# Patient Record
Sex: MALE | Race: WHITE | NOT HISPANIC OR LATINO | Employment: UNEMPLOYED | URBAN - METROPOLITAN AREA
[De-identification: names, ages, dates, MRNs, and addresses within clinical notes are randomized per-mention and may not be internally consistent; named-entity substitution may affect disease eponyms.]

---

## 2020-12-06 ENCOUNTER — HOSPITAL ENCOUNTER (EMERGENCY)
Facility: HOSPITAL | Age: 45
Discharge: HOME | End: 2020-12-06
Attending: EMERGENCY MEDICINE | Admitting: OTOLARYNGOLOGY
Payer: MEDICAID

## 2020-12-06 VITALS
TEMPERATURE: 98 F | SYSTOLIC BLOOD PRESSURE: 104 MMHG | BODY MASS INDEX: 23.9 KG/M2 | DIASTOLIC BLOOD PRESSURE: 54 MMHG | RESPIRATION RATE: 18 BRPM | OXYGEN SATURATION: 95 % | HEART RATE: 77 BPM | HEIGHT: 64 IN | WEIGHT: 140 LBS

## 2020-12-06 DIAGNOSIS — S02.40FA CLOSED FRACTURE OF LEFT ZYGOMATIC ARCH, INITIAL ENCOUNTER: Primary | ICD-10-CM
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
## 2020-12-06 DIAGNOSIS — S02.842A FRACTURE OF LATERAL ORBITAL WALL, LEFT SIDE, INITIAL ENCOUNTER FOR CLOSED FRACTURE: ICD-10-CM

## 2020-12-06 DIAGNOSIS — S02.401A CLOSED FRACTURE OF MAXILLARY SINUS, INITIAL ENCOUNTER: ICD-10-CM

## 2020-12-06 LAB
AMPHET+METHAMPHET UR QL: NORMAL
BARBITURATES UR QL SCN>200 NG/ML: NEGATIVE
BENZODIAZ UR QL SCN>200 NG/ML: NEGATIVE
BZE UR QL SCN: NEGATIVE
CANNABINOIDS UR QL SCN: NORMAL
CREAT UR-MCNC: 156 MG/DL (ref 23–375)
CTP QC/QA: YES
METHADONE UR QL SCN>300 NG/ML: NEGATIVE
OPIATES UR QL SCN: NORMAL
PCP UR QL SCN>25 NG/ML: NEGATIVE
SARS-COV-2 RDRP RESP QL NAA+PROBE: NEGATIVE
TOXICOLOGY INFORMATION: NORMAL

## 2020-12-06 PROCEDURE — 25000003 PHARM REV CODE 250: Performed by: EMERGENCY MEDICINE

## 2020-12-06 PROCEDURE — 96365 THER/PROPH/DIAG IV INF INIT: CPT

## 2020-12-06 PROCEDURE — 99284 EMERGENCY DEPT VISIT MOD MDM: CPT | Mod: 25

## 2020-12-06 PROCEDURE — 80307 DRUG TEST PRSMV CHEM ANLYZR: CPT

## 2020-12-06 PROCEDURE — 25000003 PHARM REV CODE 250: Performed by: PHYSICIAN ASSISTANT

## 2020-12-06 PROCEDURE — U0002 COVID-19 LAB TEST NON-CDC: HCPCS | Performed by: PHYSICIAN ASSISTANT

## 2020-12-06 RX ORDER — CLINDAMYCIN PHOSPHATE 600 MG/50ML
600 INJECTION, SOLUTION INTRAVENOUS
Status: COMPLETED | OUTPATIENT
Start: 2020-12-06 | End: 2020-12-06

## 2020-12-06 RX ORDER — CLINDAMYCIN HYDROCHLORIDE 150 MG/1
300 CAPSULE ORAL 4 TIMES DAILY
Qty: 56 CAPSULE | Refills: 0 | Status: SHIPPED | OUTPATIENT
Start: 2020-12-06 | End: 2020-12-13

## 2020-12-06 RX ORDER — ACETAMINOPHEN 500 MG
1000 TABLET ORAL
Status: COMPLETED | OUTPATIENT
Start: 2020-12-06 | End: 2020-12-06

## 2020-12-06 RX ADMIN — CLINDAMYCIN IN 5 PERCENT DEXTROSE 600 MG: 12 INJECTION, SOLUTION INTRAVENOUS at 07:12

## 2020-12-06 RX ADMIN — ACETAMINOPHEN 1000 MG: 500 TABLET ORAL at 09:12

## 2020-12-06 RX ADMIN — BACITRACIN, NEOMYCIN, POLYMYXIN B 1 EACH: 400; 3.5; 5 OINTMENT TOPICAL at 06:12
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
## 2020-12-07 NOTE — SUBJECTIVE & OBJECTIVE
Medications:  Continuous Infusions:  Scheduled Meds:  PRN Meds:     No current facility-administered medications on file prior to encounter.      Current Outpatient Medications on File Prior to Encounter   Medication Sig    ibuprofen (ADVIL,MOTRIN) 800 MG tablet Take 1 tablet (800 mg total) by mouth 3 (three) times daily.       Review of patient's allergies indicates:   Allergen Reactions    Penicillins Anaphylaxis       History reviewed. No pertinent past medical history.  History reviewed. No pertinent surgical history.  Family History     None        Tobacco Use    Smoking status: Current Every Day Smoker     Packs/day: 1.00    Smokeless tobacco: Never Used   Substance and Sexual Activity    Alcohol use: No    Drug use: Not Currently    Sexual activity: Not Currently     Review of Systems   Constitutional: Negative for chills and fever.   HENT: Positive for dental problem, facial swelling and nosebleeds. Negative for congestion, ear pain, hearing loss, sore throat, tinnitus, trouble swallowing and voice change.    Eyes: Negative for pain and visual disturbance.   Respiratory: Negative for shortness of breath and stridor.    Cardiovascular: Negative for chest pain.   Gastrointestinal: Positive for nausea and vomiting. Negative for abdominal pain.   Musculoskeletal: Negative for neck pain.   Skin: Positive for wound.   Allergic/Immunologic: Negative for immunocompromised state.   Neurological: Positive for dizziness. Negative for numbness and headaches.   Hematological: Does not bruise/bleed easily.   Psychiatric/Behavioral: Negative for decreased concentration and dysphoric mood. The patient is not nervous/anxious.      Objective:     Vital Signs (Most Recent):  Temp: 97.6 °F (36.4 °C) (12/06/20 1649)  Pulse: 81 (12/06/20 2015)  Resp: 20 (12/06/20 2015)  BP: 106/66 (12/06/20 2015)  SpO2: 98 % (12/06/20 2015) Vital Signs (24h Range):  Temp:  [97.6 °F (36.4 °C)] 97.6 °F (36.4 °C)  Pulse:  [81-92] 81  Resp:   [16-20] 20  SpO2:  [96 %-98 %] 98 %  BP: (106-109)/(56-66) 106/66     Weight: 63.5 kg (140 lb)  Body mass index is 24.03 kg/m².        Physical Exam   Constitutional: Well appearing/communicating. Voice clear. NAD.  Eyes: EOM I Bilaterally, denies double vision, periorbital ecchymosis L>R  Head/Face: Left facial swelling over midface, Mild House Brackmann I Bilaterally.  Right Ear: External Auditory Canal WNL,TM w/o masses/lesions/perforations. No hemotympanum. Auricle WNL.  Left Ear: External Auditory Canal WNL,TM w/o masses/lesions/perforations. No hemotympanum. Auricle WNL.  Nose: No gross nasal septal deviation. No septal hematoma. Inferior Turbinates 2+ bilaterally. No septal perforation. No masses/lesions. External nasal skin without masses/lesions.  Oral Cavity: Gingiva/lips WNL. Extremely poor dentition with many missing teeth. Oral Tongue mobile. Hard Palate WNL.   Oropharynx: Tonsillar fossa/pharyngeal wall without lesions. Posterior oropharynx WNL.  Soft palate without masses. Midline uvula.   Neck/Lymphatic: No LAD I-VI bilaterally.  No thyromegaly.  No masses noted on exam.  Neuro/Psychiatric: AOx3.  Normal mood and affect.   Respiratory: Normal respiratory effort, no stridor/stertor, no retractions noted.        Significant Labs:  None    Significant Diagnostics:  CT maxillofacial reviewed - displaced comminuted fractures of the left zygomatic arch, left lateral orbital wall, and left posterior maxillary sinus extending anteriorly to zygomatic arch, c/w ZMC fracture, fractures of the right anterior and posterior maxillary sinus walls (old per chart review)  CTH negative for intracranial complications.

## 2020-12-09 ENCOUNTER — OFFICE VISIT (OUTPATIENT)
Dept: OTOLARYNGOLOGY | Facility: CLINIC | Age: 45
End: 2020-12-09
Payer: MEDICAID

## 2020-12-09 ENCOUNTER — TELEPHONE (OUTPATIENT)
Dept: OTOLARYNGOLOGY | Facility: CLINIC | Age: 45
End: 2020-12-09

## 2020-12-09 VITALS
HEART RATE: 96 BPM | DIASTOLIC BLOOD PRESSURE: 79 MMHG | SYSTOLIC BLOOD PRESSURE: 119 MMHG | BODY MASS INDEX: 20.09 KG/M2 | WEIGHT: 117.06 LBS

## 2020-12-09 DIAGNOSIS — S02.40DA CLOSED FRACTURE OF LEFT ZYGOMATICOMAXILLARY COMPLEX, INITIAL ENCOUNTER: Primary | ICD-10-CM

## 2020-12-09 DIAGNOSIS — S02.82XA CLOSED FRACTURE OF LEFT ZYGOMATICOMAXILLARY COMPLEX, INITIAL ENCOUNTER: Primary | ICD-10-CM

## 2020-12-09 DIAGNOSIS — S02.40FA CLOSED FRACTURE OF LEFT ZYGOMATIC ARCH, INITIAL ENCOUNTER: ICD-10-CM

## 2020-12-09 DIAGNOSIS — S02.32XA CLOSED FRACTURE OF LEFT ZYGOMATICOMAXILLARY COMPLEX, INITIAL ENCOUNTER: Primary | ICD-10-CM

## 2020-12-09 DIAGNOSIS — S02.40FA CLOSED FRACTURE OF LEFT ZYGOMATICOMAXILLARY COMPLEX, INITIAL ENCOUNTER: Primary | ICD-10-CM

## 2020-12-09 PROCEDURE — 99999 PR PBB SHADOW E&M-EST. PATIENT-LVL III: ICD-10-PCS | Mod: PBBFAC,,, | Performed by: OTOLARYNGOLOGY

## 2020-12-09 PROCEDURE — 99203 PR OFFICE/OUTPT VISIT, NEW, LEVL III, 30-44 MIN: ICD-10-PCS | Mod: S$PBB,,, | Performed by: OTOLARYNGOLOGY

## 2020-12-09 PROCEDURE — 99203 OFFICE O/P NEW LOW 30 MIN: CPT | Mod: S$PBB,,, | Performed by: OTOLARYNGOLOGY

## 2020-12-09 PROCEDURE — 99213 OFFICE O/P EST LOW 20 MIN: CPT | Mod: PBBFAC | Performed by: OTOLARYNGOLOGY

## 2020-12-09 PROCEDURE — 99999 PR PBB SHADOW E&M-EST. PATIENT-LVL III: CPT | Mod: PBBFAC,,, | Performed by: OTOLARYNGOLOGY
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
## 2021-12-03 ENCOUNTER — HOSPITAL ENCOUNTER (EMERGENCY)
Facility: OTHER | Age: 46
Discharge: HOME | End: 2021-12-03
Attending: EMERGENCY MEDICINE
Payer: MEDICAID

## 2021-12-03 VITALS
WEIGHT: 120 LBS | RESPIRATION RATE: 18 BRPM | HEART RATE: 89 BPM | DIASTOLIC BLOOD PRESSURE: 82 MMHG | TEMPERATURE: 98 F | BODY MASS INDEX: 19.99 KG/M2 | HEIGHT: 65 IN | SYSTOLIC BLOOD PRESSURE: 131 MMHG | OXYGEN SATURATION: 97 %

## 2021-12-03 DIAGNOSIS — S09.93XA FACIAL TRAUMA: Primary | ICD-10-CM

## 2021-12-03 DIAGNOSIS — S00.33XA CONTUSION OF NOSE, INITIAL ENCOUNTER: ICD-10-CM

## 2021-12-03 DIAGNOSIS — R04.0 EPISTAXIS: ICD-10-CM

## 2021-12-03 DIAGNOSIS — T07.XXXA CONTUSION, MULTIPLE SITES: ICD-10-CM

## 2021-12-03 DIAGNOSIS — R07.89 CHEST WALL PAIN: ICD-10-CM

## 2021-12-03 DIAGNOSIS — R51.9 FACIAL PAIN: ICD-10-CM
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
## 2021-12-03 DIAGNOSIS — T07.XXXA ABRASION, MULTIPLE SITES: ICD-10-CM

## 2021-12-03 LAB
CTP QC/QA: YES
SARS-COV-2 RDRP RESP QL NAA+PROBE: NEGATIVE

## 2021-12-03 PROCEDURE — 99285 EMERGENCY DEPT VISIT HI MDM: CPT | Mod: 25

## 2021-12-03 PROCEDURE — U0002 COVID-19 LAB TEST NON-CDC: HCPCS | Performed by: EMERGENCY MEDICINE

## 2021-12-03 PROCEDURE — 25000003 PHARM REV CODE 250: Performed by: EMERGENCY MEDICINE

## 2021-12-03 RX ORDER — CLINDAMYCIN HYDROCHLORIDE 150 MG/1
450 CAPSULE ORAL 3 TIMES DAILY
Qty: 63 CAPSULE | Refills: 0 | Status: SHIPPED | OUTPATIENT
Start: 2021-12-03 | End: 2021-12-10

## 2021-12-03 RX ORDER — NAPROXEN 500 MG/1
500 TABLET ORAL 2 TIMES DAILY PRN
Qty: 60 TABLET | Refills: 0 | Status: ON HOLD | OUTPATIENT
Start: 2021-12-03 | End: 2023-01-13

## 2021-12-03 RX ORDER — DOXYCYCLINE 100 MG/1
100 CAPSULE ORAL 2 TIMES DAILY
Qty: 20 CAPSULE | Refills: 0 | Status: SHIPPED | OUTPATIENT
Start: 2021-12-03 | End: 2021-12-10

## 2021-12-03 RX ORDER — HYDROCODONE BITARTRATE AND ACETAMINOPHEN 5; 325 MG/1; MG/1
1 TABLET ORAL
Status: COMPLETED | OUTPATIENT
Start: 2021-12-03 | End: 2021-12-03

## 2021-12-03 RX ORDER — CLINDAMYCIN HYDROCHLORIDE 150 MG/1
450 CAPSULE ORAL
Status: COMPLETED | OUTPATIENT
Start: 2021-12-03 | End: 2021-12-03

## 2021-12-03 RX ORDER — MUPIROCIN 20 MG/G
1 OINTMENT TOPICAL
Status: COMPLETED | OUTPATIENT
Start: 2021-12-03 | End: 2021-12-03

## 2021-12-03 RX ORDER — PROPARACAINE HYDROCHLORIDE 5 MG/ML
2 SOLUTION/ DROPS OPHTHALMIC
Status: COMPLETED | OUTPATIENT
Start: 2021-12-03 | End: 2021-12-03

## 2021-12-03 RX ORDER — OXYMETAZOLINE HCL 0.05 %
2 SPRAY, NON-AEROSOL (ML) NASAL
Status: COMPLETED | OUTPATIENT
Start: 2021-12-03 | End: 2021-12-03

## 2021-12-03 RX ORDER — DOXYCYCLINE HYCLATE 100 MG
100 TABLET ORAL
Status: COMPLETED | OUTPATIENT
Start: 2021-12-03 | End: 2021-12-03

## 2021-12-03 RX ORDER — OXYMETAZOLINE HCL 0.05 %
1 SPRAY, NON-AEROSOL (ML) NASAL 2 TIMES DAILY
Qty: 15 ML | Refills: 0 | Status: SHIPPED | OUTPATIENT
Start: 2021-12-03 | End: 2021-12-06

## 2021-12-03 RX ORDER — NAPROXEN 500 MG/1
500 TABLET ORAL
Status: COMPLETED | OUTPATIENT
Start: 2021-12-03 | End: 2021-12-03

## 2021-12-03 RX ADMIN — Medication 2 SPRAY: at 01:12

## 2021-12-03 RX ADMIN — PROPARACAINE HYDROCHLORIDE 2 DROP: 5 SOLUTION/ DROPS OPHTHALMIC at 11:12

## 2021-12-03 RX ADMIN — DOXYCYCLINE HYCLATE 100 MG: 100 TABLET, COATED ORAL at 11:12

## 2021-12-03 RX ADMIN — FLUORESCEIN SODIUM 1 EACH: 1 STRIP OPHTHALMIC at 11:12

## 2021-12-03 RX ADMIN — MUPIROCIN 22 G: 20 OINTMENT TOPICAL at 11:12

## 2021-12-03 RX ADMIN — CLINDAMYCIN HYDROCHLORIDE 450 MG: 150 CAPSULE ORAL at 11:12

## 2021-12-03 RX ADMIN — NAPROXEN 500 MG: 500 TABLET ORAL at 11:12

## 2021-12-03 RX ADMIN — HYDROCODONE BITARTRATE AND ACETAMINOPHEN 1 TABLET: 5; 325 TABLET ORAL at 11:12

## 2022-01-09 ENCOUNTER — HOSPITAL ENCOUNTER (EMERGENCY)
Facility: HOSPITAL | Age: 47
Discharge: HOME | End: 2022-01-09
Attending: EMERGENCY MEDICINE
Payer: MEDICAID

## 2022-01-09 VITALS
RESPIRATION RATE: 18 BRPM | BODY MASS INDEX: 19.97 KG/M2 | SYSTOLIC BLOOD PRESSURE: 108 MMHG | TEMPERATURE: 98 F | OXYGEN SATURATION: 96 % | HEART RATE: 104 BPM | HEIGHT: 65 IN | DIASTOLIC BLOOD PRESSURE: 62 MMHG

## 2022-01-09 DIAGNOSIS — T50.901A ACCIDENTAL OVERDOSE, INITIAL ENCOUNTER: Primary | ICD-10-CM

## 2022-01-09 PROCEDURE — 99283 EMERGENCY DEPT VISIT LOW MDM: CPT

## 2022-01-09 PROCEDURE — 99284 EMERGENCY DEPT VISIT MOD MDM: CPT | Mod: ,,, | Performed by: EMERGENCY MEDICINE

## 2022-01-09 PROCEDURE — 99284 PR EMERGENCY DEPT VISIT,LEVEL IV: ICD-10-PCS | Mod: ,,, | Performed by: EMERGENCY MEDICINE

## 2022-01-09 RX ORDER — NALOXONE HYDROCHLORIDE 4 MG/.1ML
SPRAY NASAL
Qty: 1 EACH | Refills: 11 | Status: ON HOLD | OUTPATIENT
Start: 2022-01-09 | End: 2023-01-13

## 2022-01-09 NOTE — ED NOTES
Bed: [facility]  Expected date:   Expected time:   Means of arrival:   Comments:

## 2022-01-09 NOTE — ED PROVIDER NOTES
Encounter Date: 1/9/2022       History     Chief Complaint   Patient presents with    Drug Overdose     Mr. Bruno is a 46-year-old who presents by ambulance. He is unsure of what happened. He states that he completed a 28 day inpatient treatment program for heroin addiction last week and was prescribed Seroquel and Suboxone. He took Seroquel at around 03:00 and Suboxone at around 09:30.  The next thing he remembers is waking up in the ambulance.  He states that this was his first time taking Suboxone.  He feels well now and has no complaints.    He has no past medical history on file.    The history is provided by the patient. No  was used.     Review of patient's allergies indicates:   Allergen Reactions    Penicillins Anaphylaxis    Aspirin Rash     In childhood. Rash and bruising. Tolerates other NSAIDs including ibuprofen     No past medical history on file.  No past surgical history on file.  No family history on file.  Social History     Tobacco Use    Smoking status: Current Every Day Smoker     Packs/day: 1.00    Smokeless tobacco: Never Used   Substance Use Topics    Alcohol use: No    Drug use: Not Currently     Review of Systems   Constitutional: Negative for chills and fever.   Respiratory: Negative for cough and shortness of breath.    Cardiovascular: Negative for chest pain.   Gastrointestinal: Negative for abdominal pain, nausea and vomiting.   Musculoskeletal: Negative for arthralgias and myalgias.   Neurological: Negative for dizziness, light-headedness and headaches.       Physical Exam     Initial Vitals [01/09/22 1108]   BP Pulse Resp Temp SpO2   122/74 65 15 97.9 °F (36.6 °C) 95 %      MAP       --         Physical Exam    Nursing note and vitals reviewed.  Constitutional: He is not diaphoretic. No distress.   HENT:   Head: Normocephalic and atraumatic.   Eyes: Conjunctivae and EOM are normal. Pupils are equal, round, and reactive to light. No scleral icterus. Right  eye exhibits no nystagmus. Left eye exhibits no nystagmus.   Cardiovascular: Normal rate, regular rhythm and normal heart sounds. Exam reveals no gallop and no friction rub.    No murmur heard.  Pulmonary/Chest: No stridor. No respiratory distress. He has no decreased breath sounds. He has no wheezes. He has no rhonchi. He has no rales.   Abdominal: Abdomen is soft. He exhibits no distension. There is no abdominal tenderness.     Neurological: He is alert and oriented to person, place, and time. He has normal strength. No cranial nerve deficit. Coordination normal. GCS score is 15. GCS eye subscore is 4. GCS verbal subscore is 5. GCS motor subscore is 6.   Skin: Skin is warm and dry. No pallor.   Psychiatric: He has a normal mood and affect. His speech is normal and behavior is normal. Thought content normal. He is not actively hallucinating. He is attentive.         ED Course   Procedures  Labs Reviewed - No data to display       Imaging Results    None          Medications - No data to display              ED Course as of 01/09/22 1248   Sun Jan 09, 2022   1240 He still feels well.  He wishes to be discharged. He called his girlfriend and she is on her way to pick him up. [LP]      ED Course User Index  [LP] Blade Cee III, MD             Clinical Impression:   Final diagnoses:  [T50.901A] Accidental overdose, initial encounter (Primary)          ED Disposition Condition    Discharge Stable        ED Prescriptions     Medication Sig Dispense Start Date End Date Auth. Provider    naloxone (NARCAN) 4 mg/actuation Spry 4mg by nasal route as needed for opioid overdose; may repeat every 2-3 minutes in alternating nostrils until medical help arrives. Call 911 1 each 1/9/2022  Blade Cee III, MD        Follow-up Information     Follow up With Specialties Details Why Contact Info    Your primary care provider   We recommend that you arrange follow up with your primary care provider within the next few days.      [facility] Emergency Medicine  Return to the ER as needed for any concerns. [address]  [facility] [id number] [phone]           Blade Cee III, MD  01/09/22 4249

## 2022-01-09 NOTE — ED NOTES
Patient identifiers verified and correct for Endy Bruno   C/C: Overdose    APPEARANCE: Awake and alert x 4, ambulatory, moving all extremities, respiration unlabored   SKIN: warm, dry and intact. No breakdown or bruising.  MUSCULOSKELETAL: Patient moving all extremities spontaneously, no obvious swelling or deformities noted. Ambulates independently.  RESPIRATORY: Denies shortness of breath.Respirations unlabored.   CARDIAC: Denies CP, 2+ distal pulses; no peripheral edema  ABDOMEN: S/ND/NT, Denies nausea  : voids spontaneously, denies difficulty  Neurologic: AAO x 4; follows commands equal strength in all extremities; denies numbness/tingling. Denies dizziness

## 2023-01-07 ENCOUNTER — HOSPITAL ENCOUNTER (INPATIENT)
Facility: HOSPITAL | Age: 48
LOS: 6 days | Discharge: NURSING, REHAB OR LONG-TERM CARE | DRG: 711 | End: 2023-01-13
Attending: EMERGENCY MEDICINE | Admitting: UROLOGY
Payer: MEDICAID

## 2023-01-07 ENCOUNTER — ANESTHESIA (OUTPATIENT)
Dept: SURGERY | Facility: HOSPITAL | Age: 48
DRG: 711 | End: 2023-01-07
Payer: MEDICAID

## 2023-01-07 ENCOUNTER — ANESTHESIA EVENT (OUTPATIENT)
Dept: SURGERY | Facility: HOSPITAL | Age: 48
DRG: 711 | End: 2023-01-07
Payer: MEDICAID

## 2023-01-07 DIAGNOSIS — N44.00 RIGHT TESTICULAR TORSION: Primary | ICD-10-CM

## 2023-01-07 DIAGNOSIS — S39.94XD TESTICULAR INJURY, SUBSEQUENT ENCOUNTER: ICD-10-CM

## 2023-01-07 DIAGNOSIS — N45.4 TESTICULAR ABSCESS: ICD-10-CM

## 2023-01-07 DIAGNOSIS — S39.94XA: ICD-10-CM

## 2023-01-07 DIAGNOSIS — S39.94XA TESTICULAR INJURY, INITIAL ENCOUNTER: ICD-10-CM

## 2023-01-07 LAB
ABO + RH BLD: NORMAL
ALBUMIN SERPL BCP-MCNC: 3 G/DL (ref 3.5–5.2)
ALP SERPL-CCNC: 104 U/L (ref 55–135)
ALT SERPL W/O P-5'-P-CCNC: 6 U/L (ref 10–44)
ANION GAP SERPL CALC-SCNC: 10 MMOL/L (ref 8–16)
AST SERPL-CCNC: 12 U/L (ref 10–40)
BASOPHILS # BLD AUTO: 0.05 K/UL (ref 0–0.2)
BASOPHILS NFR BLD: 0.3 % (ref 0–1.9)
BILIRUB SERPL-MCNC: 0.8 MG/DL (ref 0.1–1)
BLD GP AB SCN CELLS X3 SERPL QL: NORMAL
BUN SERPL-MCNC: 9 MG/DL (ref 6–20)
CALCIUM SERPL-MCNC: 9.8 MG/DL (ref 8.7–10.5)
CHLORIDE SERPL-SCNC: 96 MMOL/L (ref 95–110)
CO2 SERPL-SCNC: 23 MMOL/L (ref 23–29)
CREAT SERPL-MCNC: 0.7 MG/DL (ref 0.5–1.4)
DIFFERENTIAL METHOD: ABNORMAL
EOSINOPHIL # BLD AUTO: 0.1 K/UL (ref 0–0.5)
EOSINOPHIL NFR BLD: 0.8 % (ref 0–8)
ERYTHROCYTE [DISTWIDTH] IN BLOOD BY AUTOMATED COUNT: 13.6 % (ref 11.5–14.5)
EST. GFR  (NO RACE VARIABLE): >60 ML/MIN/1.73 M^2
GLUCOSE SERPL-MCNC: 99 MG/DL (ref 70–110)
GRAM STN SPEC: NORMAL
GRAM STN SPEC: NORMAL
HCT VFR BLD AUTO: 36.3 % (ref 40–54)
HCV AB SERPL QL IA: REACTIVE
HGB BLD-MCNC: 11.7 G/DL (ref 14–18)
HIV 1+2 AB+HIV1 P24 AG SERPL QL IA: NORMAL
IMM GRANULOCYTES # BLD AUTO: 0.08 K/UL (ref 0–0.04)
IMM GRANULOCYTES NFR BLD AUTO: 0.5 % (ref 0–0.5)
LYMPHOCYTES # BLD AUTO: 1.3 K/UL (ref 1–4.8)
LYMPHOCYTES NFR BLD: 8.7 % (ref 18–48)
MCH RBC QN AUTO: 25.7 PG (ref 27–31)
MCHC RBC AUTO-ENTMCNC: 32.2 G/DL (ref 32–36)
MCV RBC AUTO: 80 FL (ref 82–98)
MONOCYTES # BLD AUTO: 0.7 K/UL (ref 0.3–1)
MONOCYTES NFR BLD: 4.5 % (ref 4–15)
NEUTROPHILS # BLD AUTO: 12.5 K/UL (ref 1.8–7.7)
NEUTROPHILS NFR BLD: 85.2 % (ref 38–73)
NRBC BLD-RTO: 0 /100 WBC
PLATELET # BLD AUTO: 376 K/UL (ref 150–450)
PMV BLD AUTO: 10.1 FL (ref 9.2–12.9)
POTASSIUM SERPL-SCNC: 3.8 MMOL/L (ref 3.5–5.1)
PROT SERPL-MCNC: 8 G/DL (ref 6–8.4)
RBC # BLD AUTO: 4.55 M/UL (ref 4.6–6.2)
SODIUM SERPL-SCNC: 129 MMOL/L (ref 136–145)
WBC # BLD AUTO: 14.63 K/UL (ref 3.9–12.7)

## 2023-01-07 PROCEDURE — 96372 THER/PROPH/DIAG INJ SC/IM: CPT | Performed by: EMERGENCY MEDICINE

## 2023-01-07 PROCEDURE — 25000003 PHARM REV CODE 250: Performed by: STUDENT IN AN ORGANIZED HEALTH CARE EDUCATION/TRAINING PROGRAM

## 2023-01-07 PROCEDURE — 37000009 HC ANESTHESIA EA ADD 15 MINS: Performed by: UROLOGY

## 2023-01-07 PROCEDURE — 88312 SPECIAL STAINS GROUP 1: CPT | Mod: 26,,, | Performed by: PATHOLOGY

## 2023-01-07 PROCEDURE — 99285 EMERGENCY DEPT VISIT HI MDM: CPT | Mod: ,,, | Performed by: EMERGENCY MEDICINE

## 2023-01-07 PROCEDURE — 54520 PR REMOVAL TESTIS,SIMPLE: ICD-10-PCS | Mod: RT,,, | Performed by: UROLOGY

## 2023-01-07 PROCEDURE — 71000016 HC POSTOP RECOV ADDL HR: Performed by: UROLOGY

## 2023-01-07 PROCEDURE — 87102 FUNGUS ISOLATION CULTURE: CPT | Performed by: UROLOGY

## 2023-01-07 PROCEDURE — 87522 HEPATITIS C REVRS TRNSCRPJ: CPT | Performed by: PHYSICIAN ASSISTANT

## 2023-01-07 PROCEDURE — 88305 TISSUE EXAM BY PATHOLOGIST: CPT | Performed by: PATHOLOGY

## 2023-01-07 PROCEDURE — 88312 SPECIAL STAINS GROUP 1: CPT | Performed by: PATHOLOGY

## 2023-01-07 PROCEDURE — 63600175 PHARM REV CODE 636 W HCPCS: Performed by: ANESTHESIOLOGY

## 2023-01-07 PROCEDURE — 25000003 PHARM REV CODE 250: Performed by: NURSE ANESTHETIST, CERTIFIED REGISTERED

## 2023-01-07 PROCEDURE — 88305 TISSUE EXAM BY PATHOLOGIST: ICD-10-PCS | Mod: 26,,, | Performed by: PATHOLOGY

## 2023-01-07 PROCEDURE — 63600175 PHARM REV CODE 636 W HCPCS: Performed by: UROLOGY

## 2023-01-07 PROCEDURE — 80053 COMPREHEN METABOLIC PANEL: CPT | Performed by: EMERGENCY MEDICINE

## 2023-01-07 PROCEDURE — 36000707: Performed by: UROLOGY

## 2023-01-07 PROCEDURE — 87015 SPECIMEN INFECT AGNT CONCNTJ: CPT | Performed by: UROLOGY

## 2023-01-07 PROCEDURE — 25000003 PHARM REV CODE 250: Performed by: UROLOGY

## 2023-01-07 PROCEDURE — 63600175 PHARM REV CODE 636 W HCPCS: Performed by: NURSE ANESTHETIST, CERTIFIED REGISTERED

## 2023-01-07 PROCEDURE — 87205 SMEAR GRAM STAIN: CPT | Performed by: UROLOGY

## 2023-01-07 PROCEDURE — 54700 I&D EPDIDYMS TSTIS&/SCROT SP: CPT | Mod: 51,RT,, | Performed by: UROLOGY

## 2023-01-07 PROCEDURE — 87389 HIV-1 AG W/HIV-1&-2 AB AG IA: CPT | Performed by: PHYSICIAN ASSISTANT

## 2023-01-07 PROCEDURE — D9220A PRA ANESTHESIA: Mod: CRNA,,, | Performed by: NURSE ANESTHETIST, CERTIFIED REGISTERED

## 2023-01-07 PROCEDURE — 25000003 PHARM REV CODE 250: Performed by: EMERGENCY MEDICINE

## 2023-01-07 PROCEDURE — 87075 CULTR BACTERIA EXCEPT BLOOD: CPT | Performed by: UROLOGY

## 2023-01-07 PROCEDURE — 99285 EMERGENCY DEPT VISIT HI MDM: CPT | Mod: 25

## 2023-01-07 PROCEDURE — 87070 CULTURE OTHR SPECIMN AEROBIC: CPT | Performed by: UROLOGY

## 2023-01-07 PROCEDURE — 54700 PR INCIS/DRAIN SCROTUM/TESTIS,EPIDIDYM: ICD-10-PCS | Mod: 51,RT,, | Performed by: UROLOGY

## 2023-01-07 PROCEDURE — 63600175 PHARM REV CODE 636 W HCPCS: Performed by: STUDENT IN AN ORGANIZED HEALTH CARE EDUCATION/TRAINING PROGRAM

## 2023-01-07 PROCEDURE — 37000008 HC ANESTHESIA 1ST 15 MINUTES: Performed by: UROLOGY

## 2023-01-07 PROCEDURE — 86803 HEPATITIS C AB TEST: CPT | Performed by: PHYSICIAN ASSISTANT

## 2023-01-07 PROCEDURE — 71000033 HC RECOVERY, INTIAL HOUR: Performed by: UROLOGY

## 2023-01-07 PROCEDURE — D9220A PRA ANESTHESIA: ICD-10-PCS | Mod: CRNA,,, | Performed by: NURSE ANESTHETIST, CERTIFIED REGISTERED

## 2023-01-07 PROCEDURE — 86900 BLOOD TYPING SEROLOGIC ABO: CPT | Performed by: EMERGENCY MEDICINE

## 2023-01-07 PROCEDURE — 88312 PR  SPECIAL STAINS,GROUP I: ICD-10-PCS | Mod: 26,,, | Performed by: PATHOLOGY

## 2023-01-07 PROCEDURE — 71000015 HC POSTOP RECOV 1ST HR: Performed by: UROLOGY

## 2023-01-07 PROCEDURE — D9220A PRA ANESTHESIA: ICD-10-PCS | Mod: ANES,,, | Performed by: ANESTHESIOLOGY

## 2023-01-07 PROCEDURE — 36000706: Performed by: UROLOGY

## 2023-01-07 PROCEDURE — 99285 PR EMERGENCY DEPT VISIT,LEVEL V: ICD-10-PCS | Mod: ,,, | Performed by: EMERGENCY MEDICINE

## 2023-01-07 PROCEDURE — 87116 MYCOBACTERIA CULTURE: CPT | Performed by: UROLOGY

## 2023-01-07 PROCEDURE — 88305 TISSUE EXAM BY PATHOLOGIST: CPT | Mod: 26,,, | Performed by: PATHOLOGY

## 2023-01-07 PROCEDURE — 87077 CULTURE AEROBIC IDENTIFY: CPT | Performed by: UROLOGY

## 2023-01-07 PROCEDURE — 87186 SC STD MICRODIL/AGAR DIL: CPT | Performed by: UROLOGY

## 2023-01-07 PROCEDURE — 87206 SMEAR FLUORESCENT/ACID STAI: CPT | Performed by: UROLOGY

## 2023-01-07 PROCEDURE — 54520 REMOVAL OF TESTIS: CPT | Mod: RT,,, | Performed by: UROLOGY

## 2023-01-07 PROCEDURE — 20600001 HC STEP DOWN PRIVATE ROOM

## 2023-01-07 PROCEDURE — 85025 COMPLETE CBC W/AUTO DIFF WBC: CPT | Performed by: EMERGENCY MEDICINE

## 2023-01-07 PROCEDURE — D9220A PRA ANESTHESIA: Mod: ANES,,, | Performed by: ANESTHESIOLOGY

## 2023-01-07 RX ORDER — TALC
6 POWDER (GRAM) TOPICAL NIGHTLY PRN
Status: DISCONTINUED | OUTPATIENT
Start: 2023-01-07 | End: 2023-01-13 | Stop reason: HOSPADM

## 2023-01-07 RX ORDER — LIDOCAINE HYDROCHLORIDE 10 MG/ML
1 INJECTION, SOLUTION EPIDURAL; INFILTRATION; INTRACAUDAL; PERINEURAL ONCE AS NEEDED
Status: DISCONTINUED | OUTPATIENT
Start: 2023-01-07 | End: 2023-01-13 | Stop reason: HOSPADM

## 2023-01-07 RX ORDER — MAGNESIUM SULFATE HEPTAHYDRATE 40 MG/ML
INJECTION, SOLUTION INTRAVENOUS
Status: DISCONTINUED | OUTPATIENT
Start: 2023-01-07 | End: 2023-01-07

## 2023-01-07 RX ORDER — DEXMEDETOMIDINE HYDROCHLORIDE 100 UG/ML
INJECTION, SOLUTION INTRAVENOUS
Status: DISCONTINUED | OUTPATIENT
Start: 2023-01-07 | End: 2023-01-07

## 2023-01-07 RX ORDER — MIDAZOLAM HYDROCHLORIDE 1 MG/ML
INJECTION INTRAMUSCULAR; INTRAVENOUS
Status: COMPLETED
Start: 2023-01-07 | End: 2023-01-07

## 2023-01-07 RX ORDER — HALOPERIDOL 5 MG/ML
0.5 INJECTION INTRAMUSCULAR EVERY 10 MIN PRN
Status: DISCONTINUED | OUTPATIENT
Start: 2023-01-07 | End: 2023-01-07 | Stop reason: HOSPADM

## 2023-01-07 RX ORDER — HYDROMORPHONE HYDROCHLORIDE 1 MG/ML
0.2 INJECTION, SOLUTION INTRAMUSCULAR; INTRAVENOUS; SUBCUTANEOUS EVERY 5 MIN PRN
Status: DISCONTINUED | OUTPATIENT
Start: 2023-01-07 | End: 2023-01-07 | Stop reason: HOSPADM

## 2023-01-07 RX ORDER — KETAMINE HCL IN 0.9 % NACL 50 MG/5 ML
SYRINGE (ML) INTRAVENOUS
Status: DISCONTINUED | OUTPATIENT
Start: 2023-01-07 | End: 2023-01-07

## 2023-01-07 RX ORDER — ONDANSETRON 8 MG/1
8 TABLET, ORALLY DISINTEGRATING ORAL EVERY 8 HOURS PRN
Status: DISCONTINUED | OUTPATIENT
Start: 2023-01-07 | End: 2023-01-13 | Stop reason: HOSPADM

## 2023-01-07 RX ORDER — FENTANYL CITRATE 50 UG/ML
25 INJECTION, SOLUTION INTRAMUSCULAR; INTRAVENOUS EVERY 5 MIN PRN
Status: COMPLETED | OUTPATIENT
Start: 2023-01-07 | End: 2023-01-07

## 2023-01-07 RX ORDER — ACETAMINOPHEN 325 MG/1
650 TABLET ORAL EVERY 8 HOURS PRN
Status: DISCONTINUED | OUTPATIENT
Start: 2023-01-07 | End: 2023-01-08

## 2023-01-07 RX ORDER — ROCURONIUM BROMIDE 10 MG/ML
INJECTION, SOLUTION INTRAVENOUS
Status: DISCONTINUED | OUTPATIENT
Start: 2023-01-07 | End: 2023-01-07

## 2023-01-07 RX ORDER — SODIUM CHLORIDE 9 MG/ML
INJECTION, SOLUTION INTRAVENOUS CONTINUOUS
Status: DISCONTINUED | OUTPATIENT
Start: 2023-01-07 | End: 2023-01-09

## 2023-01-07 RX ORDER — KETAMINE HCL IN 0.9 % NACL 50 MG/5 ML
10 SYRINGE (ML) INTRAVENOUS ONCE
Status: COMPLETED | OUTPATIENT
Start: 2023-01-07 | End: 2023-01-07

## 2023-01-07 RX ORDER — PHENYLEPHRINE HYDROCHLORIDE 10 MG/ML
INJECTION INTRAVENOUS
Status: DISCONTINUED | OUTPATIENT
Start: 2023-01-07 | End: 2023-01-07

## 2023-01-07 RX ORDER — SODIUM CHLORIDE 0.9 % (FLUSH) 0.9 %
10 SYRINGE (ML) INJECTION
Status: DISCONTINUED | OUTPATIENT
Start: 2023-01-07 | End: 2023-01-13 | Stop reason: HOSPADM

## 2023-01-07 RX ORDER — KETOROLAC TROMETHAMINE 30 MG/ML
INJECTION, SOLUTION INTRAMUSCULAR; INTRAVENOUS
Status: DISCONTINUED | OUTPATIENT
Start: 2023-01-07 | End: 2023-01-07

## 2023-01-07 RX ORDER — OXYCODONE HYDROCHLORIDE 5 MG/1
5 TABLET ORAL EVERY 4 HOURS PRN
Status: DISCONTINUED | OUTPATIENT
Start: 2023-01-07 | End: 2023-01-08

## 2023-01-07 RX ORDER — FENTANYL CITRATE 50 UG/ML
INJECTION, SOLUTION INTRAMUSCULAR; INTRAVENOUS
Status: DISCONTINUED | OUTPATIENT
Start: 2023-01-07 | End: 2023-01-07

## 2023-01-07 RX ORDER — ACETAMINOPHEN 325 MG/1
650 TABLET ORAL EVERY 4 HOURS PRN
Status: DISCONTINUED | OUTPATIENT
Start: 2023-01-07 | End: 2023-01-08

## 2023-01-07 RX ORDER — OXYCODONE HYDROCHLORIDE 10 MG/1
10 TABLET ORAL EVERY 4 HOURS PRN
Status: DISCONTINUED | OUTPATIENT
Start: 2023-01-07 | End: 2023-01-08

## 2023-01-07 RX ORDER — LIDOCAINE HYDROCHLORIDE 20 MG/ML
INJECTION, SOLUTION EPIDURAL; INFILTRATION; INTRACAUDAL; PERINEURAL
Status: DISCONTINUED | OUTPATIENT
Start: 2023-01-07 | End: 2023-01-07

## 2023-01-07 RX ORDER — HYDROMORPHONE HYDROCHLORIDE 1 MG/ML
0.2 INJECTION, SOLUTION INTRAMUSCULAR; INTRAVENOUS; SUBCUTANEOUS EVERY 6 HOURS PRN
Status: DISCONTINUED | OUTPATIENT
Start: 2023-01-07 | End: 2023-01-08

## 2023-01-07 RX ORDER — SODIUM CHLORIDE 0.9 % (FLUSH) 0.9 %
10 SYRINGE (ML) INJECTION
Status: DISCONTINUED | OUTPATIENT
Start: 2023-01-07 | End: 2023-01-07 | Stop reason: HOSPADM

## 2023-01-07 RX ORDER — PROPOFOL 10 MG/ML
VIAL (ML) INTRAVENOUS
Status: DISCONTINUED | OUTPATIENT
Start: 2023-01-07 | End: 2023-01-07

## 2023-01-07 RX ORDER — KETAMINE HCL IN 0.9 % NACL 50 MG/5 ML
0.3 SYRINGE (ML) INTRAVENOUS ONCE
Status: COMPLETED | OUTPATIENT
Start: 2023-01-07 | End: 2023-01-07

## 2023-01-07 RX ORDER — SUCCINYLCHOLINE CHLORIDE 20 MG/ML
INJECTION INTRAMUSCULAR; INTRAVENOUS
Status: DISCONTINUED | OUTPATIENT
Start: 2023-01-07 | End: 2023-01-07

## 2023-01-07 RX ORDER — MIDAZOLAM HYDROCHLORIDE 1 MG/ML
INJECTION INTRAMUSCULAR; INTRAVENOUS
Status: DISCONTINUED | OUTPATIENT
Start: 2023-01-07 | End: 2023-01-07

## 2023-01-07 RX ADMIN — FENTANYL CITRATE 25 MCG: 50 INJECTION INTRAMUSCULAR; INTRAVENOUS at 12:01

## 2023-01-07 RX ADMIN — VANCOMYCIN HYDROCHLORIDE 1000 MG: 1 INJECTION, POWDER, LYOPHILIZED, FOR SOLUTION INTRAVENOUS at 05:01

## 2023-01-07 RX ADMIN — ROCURONIUM BROMIDE 30 MG: 10 INJECTION, SOLUTION INTRAVENOUS at 09:01

## 2023-01-07 RX ADMIN — Medication 30 MG: at 09:01

## 2023-01-07 RX ADMIN — Medication 10 MG: at 04:01

## 2023-01-07 RX ADMIN — MAGNESIUM SULFATE 1 G: 2 INJECTION INTRAVENOUS at 09:01

## 2023-01-07 RX ADMIN — PROPOFOL 150 MG: 10 INJECTION, EMULSION INTRAVENOUS at 09:01

## 2023-01-07 RX ADMIN — FENTANYL CITRATE 25 MCG: 50 INJECTION INTRAMUSCULAR; INTRAVENOUS at 11:01

## 2023-01-07 RX ADMIN — ROCURONIUM BROMIDE 20 MG: 10 INJECTION, SOLUTION INTRAVENOUS at 10:01

## 2023-01-07 RX ADMIN — SUCCINYLCHOLINE CHLORIDE 100 MG: 20 INJECTION, SOLUTION INTRAMUSCULAR; INTRAVENOUS at 09:01

## 2023-01-07 RX ADMIN — Medication 10 MG: at 05:01

## 2023-01-07 RX ADMIN — SODIUM CHLORIDE: 9 INJECTION, SOLUTION INTRAVENOUS at 11:01

## 2023-01-07 RX ADMIN — DEXMEDETOMIDINE HYDROCHLORIDE 25 MCG: 100 INJECTION, SOLUTION INTRAVENOUS at 09:01

## 2023-01-07 RX ADMIN — PHENYLEPHRINE HYDROCHLORIDE 200 MCG: 10 INJECTION INTRAVENOUS at 10:01

## 2023-01-07 RX ADMIN — VANCOMYCIN HYDROCHLORIDE 1000 MG: 1 INJECTION, POWDER, LYOPHILIZED, FOR SOLUTION INTRAVENOUS at 09:01

## 2023-01-07 RX ADMIN — MEROPENEM 1 G: 1 INJECTION, POWDER, FOR SOLUTION INTRAVENOUS at 08:01

## 2023-01-07 RX ADMIN — FENTANYL CITRATE 100 MCG: 0.05 INJECTION, SOLUTION INTRAMUSCULAR; INTRAVENOUS at 09:01

## 2023-01-07 RX ADMIN — SUGAMMADEX 200 MG: 100 INJECTION, SOLUTION INTRAVENOUS at 10:01

## 2023-01-07 RX ADMIN — DEXTROSE 2 G: 50 INJECTION, SOLUTION INTRAVENOUS at 09:01

## 2023-01-07 RX ADMIN — HALOPERIDOL LACTATE 0.5 MG: 5 INJECTION, SOLUTION INTRAMUSCULAR at 12:01

## 2023-01-07 RX ADMIN — MIDAZOLAM HYDROCHLORIDE 4 MG: 1 INJECTION, SOLUTION INTRAMUSCULAR; INTRAVENOUS at 08:01

## 2023-01-07 RX ADMIN — OXYCODONE HYDROCHLORIDE 10 MG: 10 TABLET ORAL at 12:01

## 2023-01-07 RX ADMIN — KETOROLAC TROMETHAMINE 30 MG: 30 INJECTION, SOLUTION INTRAMUSCULAR; INTRAVENOUS at 09:01

## 2023-01-07 RX ADMIN — LIDOCAINE HYDROCHLORIDE 100 MG: 20 INJECTION, SOLUTION EPIDURAL; INFILTRATION; INTRACAUDAL; PERINEURAL at 09:01
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
## 2023-01-07 NOTE — TRANSFER OF CARE
"Anesthesia Transfer of Care Note    Patient: Endy Bruno    Procedure(s) Performed: Procedure(s) (LRB):  EXPLORATION, SCROTUM (Right)  ORCHIECTOMY (Right)  INCISION AND DRAINAGE, ABSCESS (Right)    Patient location: PACU    Anesthesia Type: general    Transport from OR: Transported from OR on 6-10 L/min O2 by face mask with adequate spontaneous ventilation    Post pain: adequate analgesia    Post assessment: no apparent anesthetic complications    Post vital signs: stable    Level of consciousness: sedated    Nausea/Vomiting: no nausea/vomiting    Complications: none    Transfer of care protocol was followed      Last vitals:   Visit Vitals  /74 (BP Location: Right arm, Patient Position: Lying)   Pulse 98   Temp 37.1 °C (98.8 °F) (Temporal)   Resp 20   Ht 5' 4" (1.626 m)   Wt 63.5 kg (140 lb)   SpO2 100%   BMI 24.03 kg/m²     "

## 2023-01-07 NOTE — ANESTHESIA POSTPROCEDURE EVALUATION
Anesthesia Post Evaluation    Patient: Endy Bruno    Procedure(s) Performed: Procedure(s) (LRB):  EXPLORATION, SCROTUM (Right)  ORCHIECTOMY (Right)  INCISION AND DRAINAGE, ABSCESS (Right)    Final Anesthesia Type: general      Patient location during evaluation: PACU  Patient participation: Yes- Able to Participate  Level of consciousness: awake and alert  Post-procedure vital signs: reviewed and stable  Pain management: adequate  Airway patency: patent    PONV status at discharge: No PONV  Anesthetic complications: no      Cardiovascular status: hemodynamically stable  Respiratory status: unassisted  Hydration status: euvolemic  Follow-up not needed.          Vitals Value Taken Time   /76 01/07/23 1233   Temp 36.6 °C (97.8 °F) 01/07/23 1233   Pulse 76 01/07/23 1233   Resp 16 01/07/23 1233   SpO2 96 % 01/07/23 1233         Event Time   Out of Recovery 11:15:00         Pain/Axel Score: Pain Rating Prior to Med Admin: 8 (1/7/2023 12:05 PM)  Pain Rating Post Med Admin: 7 (1/7/2023 12:15 PM)  Axel Score: 9 (1/7/2023 11:00 AM)
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
## 2023-01-07 NOTE — H&P
Message left for patient to call back, awaiting response. See consult note dated 1/7/23.    Ashish Becerra MD    As above.

## 2023-01-07 NOTE — BRIEF OP NOTE
[facility] - Surgery ([facility])  Brief Operative Note    SUMMARY     Surgery Date: 1/7/2023     Surgeon(s) and Role:     * Liyah Freeman MD - Primary     * Guillermo Nunn MD - Resident - Assisting     * Ashish Becerra MD - Resident - Assisting    Pre-op Diagnosis:  Testicular injury, initial encounter [S39.94XA]    Post-op Diagnosis:  Post-Op Diagnosis Codes:     * Testicular injury, initial encounter [S39.94XA]    Procedure Performed:   Procedure(s) (LRB):  EXPLORATION, SCROTUM (Right)  ORCHIECTOMY (Right)  INCISION AND DRAINAGE, ABSCESS (Right)    Anesthesia: General endotracheal anesthesia    Findings:   Purulent fluid and necrotic tissue within right hemiscrotum.   Right testicle appeared nonviable with purulent fluid drained from within the testicle itself. Right orchiectomy performed.  Wound left open and packed with one iodine-soaked kerlix.    Estimated Blood Loss: min         Specimens:   Specimen (24h ago, onward)       Start     Ordered    01/07/23 0952  Specimen to Pathology, Surgery General Surgery  Once        Comments: Pre-op Diagnosis: Testicular injury, initial encounter [S39.94XA]Procedure(s):EXPLORATION, SCROTUM Number of specimens: 1Name of specimens: Right Testicle     References:    Click here for ordering Quick Tip   Question Answer Comment   Procedure Type: General Surgery    Which provider would you like to cc? LIYAH FREEMAN    Release to patient Immediate        01/07/23 0951                  I was present for the entire case and agree with the above note.

## 2023-01-07 NOTE — ANESTHESIA PROCEDURE NOTES
Intubation    Date/Time: 1/7/2023 9:03 AM  Performed by: Talha Buckner CRNA  Authorized by: Bret Perea MD     Intubation:     Induction:  Intravenous    Intubated:  Postinduction    Mask Ventilation:  Easy mask    Attempts:  1    Attempted By:  CRNA    Method of Intubation:  Direct    Blade:  Morris 2    Laryngeal View Grade: Grade I - full view of cords      Difficult Airway Encountered?: No      Complications:  None    Airway Device:  Oral endotracheal tube    Airway Device Size:  7.5    Style/Cuff Inflation:  Cuffed    Inflation Amount (mL):  5    Tube secured:  21    Secured at:  The lips    Placement Verified By:  Capnometry    Complicating Factors:  None    Findings Post-Intubation:  BS equal bilateral and atraumatic/condition of teeth unchanged

## 2023-01-07 NOTE — PROGRESS NOTES
Pharmacokinetic Initial Assessment: IV Vancomycin    Assessment/Plan:    Initiate intravenous vancomycin with 1000 mg every 8 hours.   Desired empiric serum trough concentration is 10 to 15 mcg/mL  Draw vancomycin trough level 60 min prior to fourth dose on 1/8 at approximately 0830  Pharmacy will continue to follow and monitor vancomycin.      Please contact pharmacy at extension [phone] with any questions regarding this assessment.     Thank you for the consult,   Luiz Alvarado       Patient brief summary:  Endy Bruno is a 47 y.o. male initiated on antimicrobial therapy with IV Vancomycin for treatment of suspected skin & soft tissue infection    Drug Allergies:   Review of patient's allergies indicates:   Allergen Reactions    Penicillins Anaphylaxis    Aspirin Rash     In childhood. Rash and bruising. Tolerates other NSAIDs including ibuprofen       Actual Body Weight:   63.5kg    Renal Function:   Estimated Creatinine Clearance: 110.4 mL/min (based on SCr of 0.7 mg/dL).,     Dialysis Method (if applicable):  N/A    CBC (last 72 hours):  Recent Labs   Lab Result Units 01/07/23  0720   WBC K/uL 14.63*   Hemoglobin g/dL 11.7*   Hematocrit % 36.3*   Platelets K/uL 376   Gran % % 85.2*   Lymph % % 8.7*   Mono % % 4.5   Eosinophil % % 0.8   Basophil % % 0.3   Differential Method  Automated       Metabolic Panel (last 72 hours):  Recent Labs   Lab Result Units 01/07/23  0720   Sodium mmol/L 129*   Potassium mmol/L 3.8   Chloride mmol/L 96   CO2 mmol/L 23   Glucose mg/dL 99   BUN mg/dL 9   Creatinine mg/dL 0.7   Albumin g/dL 3.0*   Total Bilirubin mg/dL 0.8   Alkaline Phosphatase U/L 104   AST U/L 12   ALT U/L 6*       Drug levels (last 3 results):  No results for input(s): VANCOMYCINRA, VANCORANDOM, VANCOMYCINPE, VANCOPEAK, VANCOMYCINTR, VANCOTROUGH in the last 72 hours.    Microbiologic Results:  Microbiology Results (last 7 days)       Procedure Component Value Units Date/Time    Culture, Anaerobe [[id number]]  Collected: 01/07/23 0925    Order Status: Sent Specimen: Abscess from Perineum Updated: 01/07/23 0938    Gram stain [[id number]] Collected: 01/07/23 0925    Order Status: Sent Specimen: Abscess from Perineum Updated: 01/07/23 0938    Aerobic culture [[id number]] Collected: 01/07/23 0925    Order Status: Sent Specimen: Abscess from Penis Updated: 01/07/23 0937    Fungus culture [[id number]] Collected: 01/07/23 0925    Order Status: Sent Specimen: Abscess from Penis Updated: 01/07/23 0937    AFB Culture & Smear [[id number]] Collected: 01/07/23 0925    Order Status: Sent Specimen: Abscess from Penis Updated: 01/07/23 0937

## 2023-01-07 NOTE — ED NOTES
Pt remains drowsy but easily awakened with verbal stimuli. Pt denies urge to void and denied request to attempt. Pt denies any needs at this time. Call light within reach.
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
## 2023-01-07 NOTE — SUBJECTIVE & OBJECTIVE
History reviewed. No pertinent past medical history.    History reviewed. No pertinent surgical history.    Review of patient's allergies indicates:   Allergen Reactions    Penicillins Anaphylaxis    Aspirin Rash     In childhood. Rash and bruising. Tolerates other NSAIDs including ibuprofen       Family History    None         Tobacco Use    Smoking status: Every Day     Packs/day: 1.00     Types: Cigarettes    Smokeless tobacco: Never   Substance and Sexual Activity    Alcohol use: No    Drug use: Yes     Types: IV     Comment: fentanyl IV daily    Sexual activity: Not Currently       Review of Systems   Constitutional:  Negative for activity change, chills and fever.   Respiratory:  Negative for shortness of breath.    Cardiovascular:  Negative for chest pain.   Gastrointestinal:  Negative for abdominal pain, diarrhea, nausea and vomiting.   Genitourinary:  Positive for scrotal swelling and testicular pain. Negative for difficulty urinating and flank pain.   Neurological:  Negative for dizziness and weakness.     Objective:     Temp:  [98.3 °F (36.8 °C)-98.9 °F (37.2 °C)] 98.3 °F (36.8 °C)  Pulse:  [] 87  Resp:  [18-20] 20  SpO2:  [96 %-98 %] 96 %  BP: ()/(56-77) 119/70     Body mass index is 24.03 kg/m².           Drains       None                   Physical Exam  Vitals reviewed.   Constitutional:       Appearance: He is well-developed. He is not diaphoretic.      Comments: Uncomfortable appearing   HENT:      Head: Normocephalic and atraumatic.   Eyes:      General: No scleral icterus.  Cardiovascular:      Rate and Rhythm: Normal rate.   Pulmonary:      Effort: Pulmonary effort is normal. No respiratory distress.      Breath sounds: No stridor.   Abdominal:      General: There is no distension.      Palpations: Abdomen is soft.      Tenderness: There is no abdominal tenderness.   Genitourinary:     Comments: Exam is greatly limited due to patient cooperation  The scrotum is tensely swollen and  erythematous, there is no appreciable crepitus or necrotic appearing tissue, the left hemiscrotum is less swollen than the right and less tender to palpation, neither testicle are able to be fully palpated due to edema and patient discomfort  Musculoskeletal:         General: Normal range of motion.      Cervical back: Normal range of motion.   Skin:     General: Skin is warm and dry.   Neurological:      Mental Status: He is alert.       Significant Labs:    BMP:  No results for input(s): NA, K, CL, CO2, BUN, CREATININE, LABGLOM, GLUCOSE, CALCIUM in the last 168 hours.    CBC:  No results for input(s): WBC, HGB, HCT, PLT in the last 168 hours.    Blood Culture: No results for input(s): LABBLOO in the last 168 hours.  Urine Culture: No results for input(s): LABURIN in the last 168 hours.  Urine Studies: No results for input(s): COLORU, APPEARANCEUA, PHUR, SPECGRAV, PROTEINUA, GLUCUA, KETONESU, BILIRUBINUA, OCCULTUA, NITRITE, UROBILINOGEN, LEUKOCYTESUR, RBCUA, WBCUA, BACTERIA, SQUAMEPITHEL, HYALINECASTS in the last 168 hours.    Invalid input(s): WRIGHTSUR    Significant Imaging:  Findings as above

## 2023-01-07 NOTE — ASSESSMENT & PLAN NOTE
Endy Bruno is a 46 yo M with a history IVDU presenting for scrotal pain and swelling. Urology was consulted for scrotal u/s w/o blood flow on the right side.    - to OR for scrotal exploration, possible right orchiectomy, possible left orchiopexy  - the risks, benefits, and alternatives to procedures were discussed, and questions were answered. Plan to proceed with described procedure.  - consent obtained

## 2023-01-07 NOTE — NURSING TRANSFER
Nursing Transfer Note      1/7/2023     Reason patient is being transferred: meets criteria    Transfer To: [facility]    Transfer via stretcher    Transfer with IV fluids    Transported by PCT    Medicines sent: IV fluids    Any special needs or follow-up needed: none    Chart send with patient: Yes      Patient reassessed at: 1214

## 2023-01-08 LAB
AMPHET+METHAMPHET UR QL: ABNORMAL
ANION GAP SERPL CALC-SCNC: 11 MMOL/L (ref 8–16)
BARBITURATES UR QL SCN>200 NG/ML: NEGATIVE
BASOPHILS # BLD AUTO: 0.03 K/UL (ref 0–0.2)
BASOPHILS NFR BLD: 0.4 % (ref 0–1.9)
BENZODIAZ UR QL SCN>200 NG/ML: NEGATIVE
BUN SERPL-MCNC: 5 MG/DL (ref 6–20)
BZE UR QL SCN: NEGATIVE
C TRACH DNA SPEC QL NAA+PROBE: NOT DETECTED
CALCIUM SERPL-MCNC: 8.8 MG/DL (ref 8.7–10.5)
CANNABINOIDS UR QL SCN: NEGATIVE
CHLORIDE SERPL-SCNC: 98 MMOL/L (ref 95–110)
CO2 SERPL-SCNC: 22 MMOL/L (ref 23–29)
CREAT SERPL-MCNC: 0.6 MG/DL (ref 0.5–1.4)
CREAT UR-MCNC: 38 MG/DL (ref 23–375)
DIFFERENTIAL METHOD: ABNORMAL
EOSINOPHIL # BLD AUTO: 0.1 K/UL (ref 0–0.5)
EOSINOPHIL NFR BLD: 0.9 % (ref 0–8)
ERYTHROCYTE [DISTWIDTH] IN BLOOD BY AUTOMATED COUNT: 13.7 % (ref 11.5–14.5)
EST. GFR  (NO RACE VARIABLE): >60 ML/MIN/1.73 M^2
ETHANOL UR-MCNC: <10 MG/DL
GLUCOSE SERPL-MCNC: 109 MG/DL (ref 70–110)
HCT VFR BLD AUTO: 38 % (ref 40–54)
HGB BLD-MCNC: 11.6 G/DL (ref 14–18)
IMM GRANULOCYTES # BLD AUTO: 0.02 K/UL (ref 0–0.04)
IMM GRANULOCYTES NFR BLD AUTO: 0.2 % (ref 0–0.5)
LYMPHOCYTES # BLD AUTO: 1.2 K/UL (ref 1–4.8)
LYMPHOCYTES NFR BLD: 14.1 % (ref 18–48)
MCH RBC QN AUTO: 25.3 PG (ref 27–31)
MCHC RBC AUTO-ENTMCNC: 30.5 G/DL (ref 32–36)
MCV RBC AUTO: 83 FL (ref 82–98)
METHADONE UR QL SCN>300 NG/ML: NEGATIVE
MONOCYTES # BLD AUTO: 0.4 K/UL (ref 0.3–1)
MONOCYTES NFR BLD: 5.2 % (ref 4–15)
N GONORRHOEA DNA SPEC QL NAA+PROBE: NOT DETECTED
NEUTROPHILS # BLD AUTO: 6.7 K/UL (ref 1.8–7.7)
NEUTROPHILS NFR BLD: 79.2 % (ref 38–73)
NRBC BLD-RTO: 0 /100 WBC
OPIATES UR QL SCN: NEGATIVE
PCP UR QL SCN>25 NG/ML: NEGATIVE
PLATELET # BLD AUTO: 327 K/UL (ref 150–450)
PMV BLD AUTO: 10 FL (ref 9.2–12.9)
POTASSIUM SERPL-SCNC: 4 MMOL/L (ref 3.5–5.1)
RBC # BLD AUTO: 4.59 M/UL (ref 4.6–6.2)
SODIUM SERPL-SCNC: 131 MMOL/L (ref 136–145)
TOXICOLOGY INFORMATION: ABNORMAL
VANCOMYCIN TROUGH SERPL-MCNC: 6.2 UG/ML (ref 10–22)
WBC # BLD AUTO: 8.43 K/UL (ref 3.9–12.7)

## 2023-01-08 PROCEDURE — 20600001 HC STEP DOWN PRIVATE ROOM

## 2023-01-08 PROCEDURE — 63600175 PHARM REV CODE 636 W HCPCS: Performed by: STUDENT IN AN ORGANIZED HEALTH CARE EDUCATION/TRAINING PROGRAM

## 2023-01-08 PROCEDURE — 90792 PSYCH DIAG EVAL W/MED SRVCS: CPT | Mod: ,,, | Performed by: PSYCHIATRY & NEUROLOGY

## 2023-01-08 PROCEDURE — 99223 1ST HOSP IP/OBS HIGH 75: CPT | Mod: ,,, | Performed by: INTERNAL MEDICINE

## 2023-01-08 PROCEDURE — 25000003 PHARM REV CODE 250: Performed by: UROLOGY

## 2023-01-08 PROCEDURE — 36415 COLL VENOUS BLD VENIPUNCTURE: CPT | Performed by: PHYSICIAN ASSISTANT

## 2023-01-08 PROCEDURE — 99024 POSTOP FOLLOW-UP VISIT: CPT | Mod: ,,, | Performed by: UROLOGY

## 2023-01-08 PROCEDURE — 99223 PR INITIAL HOSPITAL CARE,LEVL III: ICD-10-PCS | Mod: ,,, | Performed by: INTERNAL MEDICINE

## 2023-01-08 PROCEDURE — 90792 PR PSYCHIATRIC DIAGNOSTIC EVALUATION W/MEDICAL SERVICES: ICD-10-PCS | Mod: ,,, | Performed by: PSYCHIATRY & NEUROLOGY

## 2023-01-08 PROCEDURE — 63600175 PHARM REV CODE 636 W HCPCS: Performed by: UROLOGY

## 2023-01-08 PROCEDURE — 87040 BLOOD CULTURE FOR BACTERIA: CPT | Mod: 59 | Performed by: PHYSICIAN ASSISTANT

## 2023-01-08 PROCEDURE — 80307 DRUG TEST PRSMV CHEM ANLYZR: CPT | Performed by: STUDENT IN AN ORGANIZED HEALTH CARE EDUCATION/TRAINING PROGRAM

## 2023-01-08 PROCEDURE — 80048 BASIC METABOLIC PNL TOTAL CA: CPT | Performed by: STUDENT IN AN ORGANIZED HEALTH CARE EDUCATION/TRAINING PROGRAM

## 2023-01-08 PROCEDURE — 85025 COMPLETE CBC W/AUTO DIFF WBC: CPT | Performed by: STUDENT IN AN ORGANIZED HEALTH CARE EDUCATION/TRAINING PROGRAM

## 2023-01-08 PROCEDURE — 87591 N.GONORRHOEAE DNA AMP PROB: CPT | Performed by: PHYSICIAN ASSISTANT

## 2023-01-08 PROCEDURE — 25000003 PHARM REV CODE 250: Performed by: STUDENT IN AN ORGANIZED HEALTH CARE EDUCATION/TRAINING PROGRAM

## 2023-01-08 PROCEDURE — 80202 ASSAY OF VANCOMYCIN: CPT | Performed by: STUDENT IN AN ORGANIZED HEALTH CARE EDUCATION/TRAINING PROGRAM

## 2023-01-08 PROCEDURE — 99024 PR POST-OP FOLLOW-UP VISIT: ICD-10-PCS | Mod: ,,, | Performed by: UROLOGY

## 2023-01-08 RX ORDER — OXYCODONE HYDROCHLORIDE 10 MG/1
10 TABLET ORAL EVERY 4 HOURS PRN
Status: DISCONTINUED | OUTPATIENT
Start: 2023-01-08 | End: 2023-01-13

## 2023-01-08 RX ORDER — ACETAMINOPHEN 500 MG
1000 TABLET ORAL EVERY 6 HOURS
Status: DISCONTINUED | OUTPATIENT
Start: 2023-01-09 | End: 2023-01-13

## 2023-01-08 RX ORDER — HYDROMORPHONE HYDROCHLORIDE 1 MG/ML
0.5 INJECTION, SOLUTION INTRAMUSCULAR; INTRAVENOUS; SUBCUTANEOUS ONCE
Status: COMPLETED | OUTPATIENT
Start: 2023-01-08 | End: 2023-01-08

## 2023-01-08 RX ORDER — SULFAMETHOXAZOLE AND TRIMETHOPRIM 800; 160 MG/1; MG/1
1 TABLET ORAL 2 TIMES DAILY
Status: DISCONTINUED | OUTPATIENT
Start: 2023-01-08 | End: 2023-01-08

## 2023-01-08 RX ORDER — POLYETHYLENE GLYCOL 3350 17 G/17G
17 POWDER, FOR SOLUTION ORAL DAILY
Status: DISCONTINUED | OUTPATIENT
Start: 2023-01-09 | End: 2023-01-13

## 2023-01-08 RX ORDER — HYDROMORPHONE HYDROCHLORIDE 1 MG/ML
0.5 INJECTION, SOLUTION INTRAMUSCULAR; INTRAVENOUS; SUBCUTANEOUS EVERY 6 HOURS PRN
Status: DISCONTINUED | OUTPATIENT
Start: 2023-01-08 | End: 2023-01-13

## 2023-01-08 RX ADMIN — HYDROMORPHONE HYDROCHLORIDE 0.5 MG: 1 INJECTION, SOLUTION INTRAMUSCULAR; INTRAVENOUS; SUBCUTANEOUS at 09:01

## 2023-01-08 RX ADMIN — VANCOMYCIN HYDROCHLORIDE 1000 MG: 1 INJECTION, POWDER, LYOPHILIZED, FOR SOLUTION INTRAVENOUS at 10:01

## 2023-01-08 RX ADMIN — SULFAMETHOXAZOLE AND TRIMETHOPRIM 1 TABLET: 800; 160 TABLET ORAL at 11:01

## 2023-01-08 RX ADMIN — VANCOMYCIN HYDROCHLORIDE 1000 MG: 1 INJECTION, POWDER, LYOPHILIZED, FOR SOLUTION INTRAVENOUS at 12:01

## 2023-01-08 RX ADMIN — SODIUM CHLORIDE: 9 INJECTION, SOLUTION INTRAVENOUS at 03:01

## 2023-01-08 RX ADMIN — MEROPENEM 1 G: 1 INJECTION INTRAVENOUS at 08:01

## 2023-01-08 RX ADMIN — ONDANSETRON 8 MG: 8 TABLET, ORALLY DISINTEGRATING ORAL at 12:01

## 2023-01-08 RX ADMIN — MEROPENEM 1 G: 1 INJECTION, POWDER, FOR SOLUTION INTRAVENOUS at 11:01

## 2023-01-08 RX ADMIN — MEROPENEM 1 G: 1 INJECTION, POWDER, FOR SOLUTION INTRAVENOUS at 03:01

## 2023-01-08 RX ADMIN — OXYCODONE 5 MG: 5 TABLET ORAL at 03:01

## 2023-01-08 RX ADMIN — OXYCODONE HYDROCHLORIDE 10 MG: 10 TABLET ORAL at 11:01

## 2023-01-08 RX ADMIN — SODIUM CHLORIDE: 9 INJECTION, SOLUTION INTRAVENOUS at 02:01

## 2023-01-08 NOTE — PROGRESS NOTES
VANCOMYCIN DOSING BY PHARMACY DISCONTINUATION NOTE    Endy Bruno is a 47 y.o. male who had been consulted for vancomycin dosing.    The pharmacy consult for vancomycin dosing has been discontinued.     Vancomycin Dosing by Pharmacy Consult will sign-off. Please reconsult if necessary. Thank you for allowing us to participate in this patient's care.       Lavon Britton, PharmD  Ext. [phone]

## 2023-01-08 NOTE — ASSESSMENT & PLAN NOTE
48 yo male with Hx IVDU s/p injury to scrotum with progressive pain and edema found to have R testicle impaired blood flow.  S/P I&D and R testicle found non viable and with purulence.  CXs show GNR.  On vanc and meropenem(has PCN anaphylaxis per chart).  Tmax/current 100.3.  No blood cultures sent. Testing shows Hep C + and HIV negative.    Plan:  · Dc Vanc   · Continue Meropenem  · FU OR cultures  · Check urine GC/Chlamydia  · Check Blood cultures  · Seen with ID staff  · Will follow and tailor abx

## 2023-01-08 NOTE — ASSESSMENT & PLAN NOTE
Endy Bruno is a 46 yo M with a history IVDU presenting for scrotal pain and swelling. Urology was consulted for scrotal u/s w/o blood flow on the right side.    - s/p scrotal exploration, right orchiectomy, scrotal washout  - wound care consulted for wound packing  - ID consulted, appreciate recs  - psychiatry consulted for withdrawal, appreciate recs  - will d/c vancomycin and transition to bactrim today as nursing having great difficulty with reliable vancomycin trough draws
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
## 2023-01-08 NOTE — PROGRESS NOTES
Patient vomited while sleeping. Asked patient why he had vomited and he said it was due to heroin withdrawals. Gave SL disintegrating zofran tablet for relief of N/V.  Patient AAOx4, VSS on RA.and responds to questions.     Dr. Ashish Becerra, Urology, informed.  Edison Penn RN
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
## 2023-01-08 NOTE — PLAN OF CARE
POC reviewed. AAOx4 able to assist in his care. VSS on RA with no complaints of SOB, headaches, or dizziness. Scrotal wound packing intact. Patient urine output to urinal bedside, adequate. Regular diet, no intake during evening shift with no bowel movements.  Patient vomited while asleep in bed around midnight. When asked why the sudden nausea and vomiting the patient responded that it was due to heroin withdrawals. Patient refused AM labs insisting to be left alone to sleep as he didn't feel well. Dr. Ashish Becerra, urology, informed. Administered SL zofran which provided for some relief of nausea. Denies pain with no request for pain medications. Resting with side rails up and call light within reach. Continuing to monitor patient status.
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
## 2023-01-08 NOTE — SUBJECTIVE & OBJECTIVE
History reviewed. No pertinent past medical history.    History reviewed. No pertinent surgical history.    Review of patient's allergies indicates:   Allergen Reactions    Penicillins Anaphylaxis    Aspirin Rash     In childhood. Rash and bruising. Tolerates other NSAIDs including ibuprofen       Medications:  Medications Prior to Admission   Medication Sig    naloxone (NARCAN) 4 mg/actuation Spry 4mg by nasal route as needed for opioid overdose; may repeat every 2-3 minutes in alternating nostrils until medical help arrives. Call 911    naproxen (NAPROSYN) 500 MG tablet Take 1 tablet (500 mg total) by mouth 2 (two) times daily as needed (pain).     Antibiotics (From admission, onward)      Start     Stop Route Frequency Ordered    01/07/23 1945  meropenem (MERREM) 1 g in sodium chloride 0.9 % 100 mL IVPB (MB+)         -- IV Every 8 hours (non-standard times) 01/07/23 1841    01/07/23 1730  vancomycin (VANCOCIN) 1,000 mg in dextrose 5 % 250 mL IVPB         -- IV Every 8 hours (non-standard times) 01/07/23 1228    01/07/23 1142  vancomycin - pharmacy to dose  (vancomycin IVPB)        See Hyperspace for full Linked Orders Report.    -- IV pharmacy to manage frequency 01/07/23 1050          Antifungals (From admission, onward)      None          Antivirals (From admission, onward)      None             Immunization History   Administered Date(s) Administered    COVID-19, MRNA, LN-S, PF (Pfizer) (Purple Cap) 09/25/2021       Family History    None       Social History     Socioeconomic History    Marital status: Unknown   Tobacco Use    Smoking status: Every Day     Packs/day: 1.00     Types: Cigarettes    Smokeless tobacco: Never   Substance and Sexual Activity    Alcohol use: No    Drug use: Yes     Types: IV     Comment: fentanyl IV daily    Sexual activity: Not Currently     Review of Systems   Constitutional:  Negative for appetite change, chills, diaphoresis, fatigue, fever and unexpected weight change.   HENT:   Negative for congestion, ear pain, sore throat and tinnitus.    Eyes:  Negative for pain, redness and visual disturbance.   Respiratory:  Negative for cough, shortness of breath and wheezing.    Cardiovascular:  Negative for chest pain, palpitations and leg swelling.   Gastrointestinal:  Negative for abdominal pain, constipation, diarrhea, rectal pain and vomiting.   Endocrine: Negative for cold intolerance and heat intolerance.   Genitourinary:  Positive for scrotal swelling and testicular pain. Negative for dysuria, flank pain, frequency, hematuria and urgency.   Musculoskeletal:  Negative for arthralgias, back pain, myalgias and neck pain.   Skin:  Negative for rash.   Allergic/Immunologic: Negative for immunocompromised state.   Neurological:  Negative for dizziness, light-headedness, numbness and headaches.   Hematological:  Negative for adenopathy. Does not bruise/bleed easily.   Psychiatric/Behavioral:  Negative for confusion and sleep disturbance. The patient is not nervous/anxious.    Objective:     Vital Signs (Most Recent):  Temp: 100.3 °F (37.9 °C) (01/08/23 0754)  Pulse: 85 (01/08/23 0754)  Resp: 20 (01/08/23 0754)  BP: (!) 112/56 (01/08/23 0754)  SpO2: 97 % (01/08/23 0754)   Vital Signs (24h Range):  Temp:  [97.8 °F (36.6 °C)-100.3 °F (37.9 °C)] 100.3 °F (37.9 °C)  Pulse:  [76-98] 85  Resp:  [15-20] 20  SpO2:  [94 %-100 %] 97 %  BP: (100-130)/(56-85) 112/56     Weight: 63.5 kg (140 lb)  Body mass index is 24.03 kg/m².    Estimated Creatinine Clearance: 110.4 mL/min (based on SCr of 0.7 mg/dL).    Physical Exam  Constitutional:       General: He is not in acute distress.     Appearance: He is well-developed. He is ill-appearing. He is not diaphoretic.   HENT:      Head: Normocephalic and atraumatic.   Cardiovascular:      Rate and Rhythm: Normal rate and regular rhythm.      Heart sounds: Normal heart sounds. No murmur heard.    No friction rub. No gallop.   Pulmonary:      Effort: Pulmonary effort is  normal. No respiratory distress.      Breath sounds: Normal breath sounds. No wheezing or rales.   Abdominal:      General: Bowel sounds are normal. There is no distension.      Palpations: Abdomen is soft. There is no mass.      Tenderness: There is no abdominal tenderness. There is no guarding or rebound.   Skin:     General: Skin is warm and dry.   Neurological:      Mental Status: He is alert and oriented to person, place, and time.   Psychiatric:         Behavior: Behavior normal.       Significant Labs: Blood Culture: No results for input(s): LABBLOO in the last 4320 hours.  CBC:   Recent Labs   Lab 01/07/23  0720   WBC 14.63*   HGB 11.7*   HCT 36.3*        CMP:   Recent Labs   Lab 01/07/23  0720   *   K 3.8   CL 96   CO2 23   GLU 99   BUN 9   CREATININE 0.7   CALCIUM 9.8   PROT 8.0   ALBUMIN 3.0*   BILITOT 0.8   ALKPHOS 104   AST 12   ALT 6*   ANIONGAP 10     Urine Culture: No results for input(s): LABURIN in the last 4320 hours.  Urine Studies: No results for input(s): COLORU, APPEARANCEUA, PHUR, SPECGRAV, PROTEINUA, GLUCUA, KETONESU, BILIRUBINUA, OCCULTUA, NITRITE, UROBILINOGEN, LEUKOCYTESUR, RBCUA, WBCUA, BACTERIA, SQUAMEPITHEL, HYALINECASTS in the last 4320 hours.    Invalid input(s): WRIGHTSUR  Wound Culture:   Recent Labs   Lab 01/07/23  0925   LABAERO GRAM NEGATIVE NELLY  Moderate  Identification and susceptibility pending  *     All pertinent labs within the past 24 hours have been reviewed.    Significant Imaging: I have reviewed all pertinent imaging results/findings within the past 24 hours.  US Scrotum And Testicles [[id number]] (Abnormal) Resulted: 01/07/23 0616   Order Status: Completed Updated: 01/07/23 0619   Narrative:     EXAMINATION:   US SCROTUM AND TESTICLES     CLINICAL HISTORY:   scrotal swelling;     TECHNIQUE:   Sonography of the scrotum and testes.     COMPARISON:   None.     FINDINGS:   Technically difficult study secondary to patient discomfort and positioning.     On  submitted imaging there is appearance scrotal wall thickening.     The right testicle was only able to be measured a single dimension at 3.6 cm.  There is hydrocele.  No varicocele.  Significantly vascular flow with only minimal arterial and venous flow sonographically detectable.  There is prominent heterogeneous echotexture of the right testicle.  The right epididymis is not well evaluated on this exam.     The left testicle measures 3.7 x 2.3 x 3.0 cm.  There is no hydrocele.  No varicocele.  Color Doppler imaging demonstrates appropriate flow to the left testicle.  Homogeneous echotexture.  The left epididymis is not optimally evaluated.    Impression:       Heterogeneous echotexture of the right testicle with minimal sonographically detectable arterial and venous flow consistent with vascular compromise of the right testicle.  This may relate to sequelae of posttraumatic injury, may relate to sequelae of testicular torsion, close clinical and historical correlation and evaluation is recommended.     The findings were discussed with Dr. Levin in the ER at the time of dictation.     This report was flagged in Epic as abnormal.     Electronically signed by resident: Carson Hall   Date: 01/07/2023   Time: 05:50     Electronically signed by: Sanju Smith   Date: 01/07/2023   Time: 06:16     Imaging History    2023    Date Procedure Name Study Review Link PACS Link Status Accession Number Location   01/07/23 05:40 AM US Scrotum And Testicles Study Review  Images Final [id number] [facility]

## 2023-01-08 NOTE — PLAN OF CARE
Pt Aox4. VSS. No signs of respiratory distress on RA. Pt had dressing change by MD. Continues ABX therapy.  Pt remained injury free through shift. Will continue POC.

## 2023-01-08 NOTE — PROGRESS NOTES
Patient refused AM labs. Patient insisted he be left alone to sleep as he wasn't feeling well. Patient discomfort due to heroin withdrawal with symptoms of N/V starting around midnight.   Edison Penn RN

## 2023-01-08 NOTE — SUBJECTIVE & OBJECTIVE
Interval History: Tmax 100.3 overnight. Having some issues with diaphoresis, nausea. Has withdrawn from IVDU in the past and believes these symptoms are similar to prior withdrawal symptoms.      Objective:     Temp:  [97.8 °F (36.6 °C)-100.3 °F (37.9 °C)] 100.3 °F (37.9 °C)  Pulse:  [76-98] 85  Resp:  [15-20] 16  SpO2:  [94 %-100 %] 97 %  BP: (104-124)/(56-80) 112/56     Body mass index is 24.03 kg/m².           Drains       None                   Physical Exam  Vitals reviewed.   Constitutional:       Appearance: He is well-developed. He is not diaphoretic.   HENT:      Head: Normocephalic and atraumatic.   Eyes:      General: No scleral icterus.  Cardiovascular:      Rate and Rhythm: Normal rate.   Pulmonary:      Effort: Pulmonary effort is normal. No respiratory distress.      Breath sounds: No stridor.   Abdominal:      General: There is no distension.      Palpations: Abdomen is soft.      Tenderness: There is no abdominal tenderness.   Genitourinary:     Comments: Scrotal wound open, packed with betadine soaked kerlix  Musculoskeletal:         General: Normal range of motion.      Cervical back: Normal range of motion.   Skin:     General: Skin is warm and dry.   Neurological:      Mental Status: He is alert.       Significant Labs:    BMP:  Recent Labs   Lab 01/07/23  0720 01/08/23  1003   * 131*   K 3.8 4.0   CL 96 98   CO2 23 22*   BUN 9 5*   CREATININE 0.7 0.6   CALCIUM 9.8 8.8       CBC:   Recent Labs   Lab 01/07/23  0720 01/08/23  1003   WBC 14.63* 8.43   HGB 11.7* 11.6*   HCT 36.3* 38.0*    327       Blood Culture: No results for input(s): LABBLOO in the last 168 hours.  Urine Culture: No results for input(s): LABURIN in the last 168 hours.  Urine Studies: No results for input(s): COLORU, APPEARANCEUA, PHUR, SPECGRAV, PROTEINUA, GLUCUA, KETONESU, BILIRUBINUA, OCCULTUA, NITRITE, UROBILINOGEN, LEUKOCYTESUR, RBCUA, WBCUA, BACTERIA, SQUAMEPITHEL, HYALINECASTS in the last 168 hours.    Invalid  input(s): JONA    Significant Imaging:  All pertinent imaging results/findings from the past 24 hours have been reviewed.

## 2023-01-09 PROBLEM — N45.4 TESTICULAR ABSCESS: Status: ACTIVE | Noted: 2023-01-09

## 2023-01-09 LAB
ANION GAP SERPL CALC-SCNC: 10 MMOL/L (ref 8–16)
BACTERIA SPEC AEROBE CULT: ABNORMAL
BASOPHILS # BLD AUTO: 0.03 K/UL (ref 0–0.2)
BASOPHILS NFR BLD: 0.6 % (ref 0–1.9)
BUN SERPL-MCNC: 6 MG/DL (ref 6–20)
CALCIUM SERPL-MCNC: 9.1 MG/DL (ref 8.7–10.5)
CHLORIDE SERPL-SCNC: 101 MMOL/L (ref 95–110)
CO2 SERPL-SCNC: 21 MMOL/L (ref 23–29)
CREAT SERPL-MCNC: 0.7 MG/DL (ref 0.5–1.4)
DIFFERENTIAL METHOD: ABNORMAL
EOSINOPHIL # BLD AUTO: 0.1 K/UL (ref 0–0.5)
EOSINOPHIL NFR BLD: 1.7 % (ref 0–8)
ERYTHROCYTE [DISTWIDTH] IN BLOOD BY AUTOMATED COUNT: 13.6 % (ref 11.5–14.5)
EST. GFR  (NO RACE VARIABLE): >60 ML/MIN/1.73 M^2
GLUCOSE SERPL-MCNC: 108 MG/DL (ref 70–110)
HCT VFR BLD AUTO: 39 % (ref 40–54)
HCV RNA SERPL NAA+PROBE-ACNC: NORMAL IU/ML
HGB BLD-MCNC: 12.7 G/DL (ref 14–18)
IMM GRANULOCYTES # BLD AUTO: 0.02 K/UL (ref 0–0.04)
IMM GRANULOCYTES NFR BLD AUTO: 0.4 % (ref 0–0.5)
LYMPHOCYTES # BLD AUTO: 1.4 K/UL (ref 1–4.8)
LYMPHOCYTES NFR BLD: 30.1 % (ref 18–48)
MCH RBC QN AUTO: 25.8 PG (ref 27–31)
MCHC RBC AUTO-ENTMCNC: 32.6 G/DL (ref 32–36)
MCV RBC AUTO: 79 FL (ref 82–98)
MONOCYTES # BLD AUTO: 0.4 K/UL (ref 0.3–1)
MONOCYTES NFR BLD: 8.4 % (ref 4–15)
NEUTROPHILS # BLD AUTO: 2.7 K/UL (ref 1.8–7.7)
NEUTROPHILS NFR BLD: 58.8 % (ref 38–73)
NRBC BLD-RTO: 0 /100 WBC
PLATELET # BLD AUTO: 335 K/UL (ref 150–450)
PMV BLD AUTO: 9.9 FL (ref 9.2–12.9)
POTASSIUM SERPL-SCNC: 3.9 MMOL/L (ref 3.5–5.1)
RBC # BLD AUTO: 4.93 M/UL (ref 4.6–6.2)
SODIUM SERPL-SCNC: 132 MMOL/L (ref 136–145)
WBC # BLD AUTO: 4.62 K/UL (ref 3.9–12.7)

## 2023-01-09 PROCEDURE — 99233 SBSQ HOSP IP/OBS HIGH 50: CPT | Mod: ,,, | Performed by: PSYCHIATRY & NEUROLOGY

## 2023-01-09 PROCEDURE — 25000003 PHARM REV CODE 250: Performed by: STUDENT IN AN ORGANIZED HEALTH CARE EDUCATION/TRAINING PROGRAM

## 2023-01-09 PROCEDURE — 99233 SBSQ HOSP IP/OBS HIGH 50: CPT | Mod: ,,, | Performed by: INTERNAL MEDICINE

## 2023-01-09 PROCEDURE — 85025 COMPLETE CBC W/AUTO DIFF WBC: CPT | Performed by: STUDENT IN AN ORGANIZED HEALTH CARE EDUCATION/TRAINING PROGRAM

## 2023-01-09 PROCEDURE — 20600001 HC STEP DOWN PRIVATE ROOM

## 2023-01-09 PROCEDURE — 99233 PR SUBSEQUENT HOSPITAL CARE,LEVL III: ICD-10-PCS | Mod: ,,, | Performed by: INTERNAL MEDICINE

## 2023-01-09 PROCEDURE — 63600175 PHARM REV CODE 636 W HCPCS: Performed by: STUDENT IN AN ORGANIZED HEALTH CARE EDUCATION/TRAINING PROGRAM

## 2023-01-09 PROCEDURE — 99233 PR SUBSEQUENT HOSPITAL CARE,LEVL III: ICD-10-PCS | Mod: ,,, | Performed by: PSYCHIATRY & NEUROLOGY

## 2023-01-09 PROCEDURE — 36415 COLL VENOUS BLD VENIPUNCTURE: CPT | Performed by: STUDENT IN AN ORGANIZED HEALTH CARE EDUCATION/TRAINING PROGRAM

## 2023-01-09 PROCEDURE — 80048 BASIC METABOLIC PNL TOTAL CA: CPT | Performed by: STUDENT IN AN ORGANIZED HEALTH CARE EDUCATION/TRAINING PROGRAM

## 2023-01-09 PROCEDURE — S4991 NICOTINE PATCH NONLEGEND: HCPCS | Performed by: STUDENT IN AN ORGANIZED HEALTH CARE EDUCATION/TRAINING PROGRAM

## 2023-01-09 RX ORDER — LEVOFLOXACIN 750 MG/1
750 TABLET ORAL DAILY
Status: DISCONTINUED | OUTPATIENT
Start: 2023-01-10 | End: 2023-01-09

## 2023-01-09 RX ORDER — HYDROXYZINE PAMOATE 50 MG/1
50 CAPSULE ORAL EVERY 8 HOURS PRN
Status: DISCONTINUED | OUTPATIENT
Start: 2023-01-09 | End: 2023-01-13

## 2023-01-09 RX ORDER — LEVOFLOXACIN 500 MG/1
500 TABLET, FILM COATED ORAL DAILY
Status: DISCONTINUED | OUTPATIENT
Start: 2023-01-09 | End: 2023-01-09

## 2023-01-09 RX ORDER — DICYCLOMINE HYDROCHLORIDE 10 MG/1
10 CAPSULE ORAL 4 TIMES DAILY PRN
Status: DISCONTINUED | OUTPATIENT
Start: 2023-01-09 | End: 2023-01-13

## 2023-01-09 RX ORDER — CIPROFLOXACIN 500 MG/1
500 TABLET ORAL EVERY 12 HOURS
Status: DISCONTINUED | OUTPATIENT
Start: 2023-01-09 | End: 2023-01-09

## 2023-01-09 RX ORDER — SULFAMETHOXAZOLE AND TRIMETHOPRIM 800; 160 MG/1; MG/1
1 TABLET ORAL 2 TIMES DAILY
Status: DISCONTINUED | OUTPATIENT
Start: 2023-01-09 | End: 2023-01-09

## 2023-01-09 RX ORDER — IBUPROFEN 200 MG
1 TABLET ORAL DAILY
Status: DISCONTINUED | OUTPATIENT
Start: 2023-01-09 | End: 2023-01-13

## 2023-01-09 RX ORDER — METHOCARBAMOL 500 MG/1
500 TABLET, FILM COATED ORAL 4 TIMES DAILY PRN
Status: DISCONTINUED | OUTPATIENT
Start: 2023-01-09 | End: 2023-01-13

## 2023-01-09 RX ORDER — HYDROMORPHONE HYDROCHLORIDE 1 MG/ML
1 INJECTION, SOLUTION INTRAMUSCULAR; INTRAVENOUS; SUBCUTANEOUS ONCE
Status: COMPLETED | OUTPATIENT
Start: 2023-01-09 | End: 2023-01-09

## 2023-01-09 RX ADMIN — ACETAMINOPHEN 1000 MG: 500 TABLET ORAL at 11:01

## 2023-01-09 RX ADMIN — CIPROFLOXACIN HYDROCHLORIDE 750 MG: 500 TABLET, FILM COATED ORAL at 09:01

## 2023-01-09 RX ADMIN — OXYCODONE HYDROCHLORIDE 15 MG: 10 TABLET ORAL at 11:01

## 2023-01-09 RX ADMIN — ACETAMINOPHEN 1000 MG: 500 TABLET ORAL at 05:01

## 2023-01-09 RX ADMIN — MEROPENEM 1 G: 1 INJECTION INTRAVENOUS at 11:01

## 2023-01-09 RX ADMIN — OXYCODONE HYDROCHLORIDE 15 MG: 10 TABLET ORAL at 06:01

## 2023-01-09 RX ADMIN — HYDROMORPHONE HYDROCHLORIDE 1 MG: 1 INJECTION, SOLUTION INTRAMUSCULAR; INTRAVENOUS; SUBCUTANEOUS at 08:01

## 2023-01-09 RX ADMIN — SODIUM CHLORIDE: 9 INJECTION, SOLUTION INTRAVENOUS at 04:01

## 2023-01-09 RX ADMIN — Medication 1 PATCH: at 05:01

## 2023-01-09 RX ADMIN — OXYCODONE HYDROCHLORIDE 15 MG: 10 TABLET ORAL at 05:01

## 2023-01-09 RX ADMIN — ACETAMINOPHEN 1000 MG: 500 TABLET ORAL at 01:01

## 2023-01-09 RX ADMIN — HYDROMORPHONE HYDROCHLORIDE 0.5 MG: 1 INJECTION, SOLUTION INTRAMUSCULAR; INTRAVENOUS; SUBCUTANEOUS at 01:01

## 2023-01-09 RX ADMIN — HYDROMORPHONE HYDROCHLORIDE 0.5 MG: 1 INJECTION, SOLUTION INTRAMUSCULAR; INTRAVENOUS; SUBCUTANEOUS at 07:01

## 2023-01-09 RX ADMIN — OXYCODONE HYDROCHLORIDE 15 MG: 10 TABLET ORAL at 01:01

## 2023-01-09 RX ADMIN — MEROPENEM 1 G: 1 INJECTION INTRAVENOUS at 04:01
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
## 2023-01-09 NOTE — PROGRESS NOTES
"ADDICTION CONSULT FOLLOW UP VISIT     DEPARTMENT:  Psychiatry  SITE: [facility] [address]    DATE OF ADMISSION: 1/7/2023  3:56 AM  LENGTH OF STAY: 2 days    EXAMINING PRACTITIONER: Lorenzo Murguia      SUBJECTIVE:     HISTORY    Patient Name: Endy Bruno  YOB: 1975    CHIEF COMPLAINT   Endy Bruno is a 47 y.o. male who is being seen today for a follow up visit by the addiction psychiatry consult service.  Endy Bruno presents with the chief complaint of: testicular injury s/p R orchiectomy and scrotal washout.    HPI & PSYCHIATRIC ROS  Per Initial HPI:  On psych exam, patient lying in bed under the blankets, in obvious discomfort. Endorses using IV heroin, estimates 0.5g per day for the past 10 years. Last use was on 1/7/23. Endorses several withdrawal symptoms, including nausea and vomiting, joint aches, sweating, and anxiety- is notably restless with slight tremor. COWS 15. Says he has been to rehab multiple times, including at  [facility], [facility], and [facility]. He is not interested in going to rehab again at this time. Reports feeling "antsy" today and would like to be started on Suboxone. Says he was previously prescribed it for months from a provider associated with a [employer]. Per PDMP review, given 4 tabs 9/9/21 by Dr. Kevin Yadav and no refills. Denies SI/HI/AVH.   ____________________________________________  Patient resting in bed comfortably this morning, states that he does continue to have cravings and wishes to get on suboxone eventually. Discussed with patient that given he is currently on full-agonist medications, this would necessitate a withdrawal period. He states that he would like to defer initiating suboxone to after hospitalization, as he continues to experience significant pain from his surgical site. Discussed with patient that we will make available resources for him to follow-up with suboxone providers upon discharge, to which he is amenable. Denies SI, HI, " "AVH.      PFSH: The patient's past medical history has been reviewed and updated as appropriate within the electronic medical record system.    PSYCHOTROPIC MEDICATIONS   Vistaril 50mg q8h PRN for anxiety      OBJECTIVE:     EXAMINATION    Vitals:    01/09/23 0554 01/09/23 0713 01/09/23 0714 01/09/23 0835   BP:   125/82    BP Location:       Patient Position:       Pulse:   79    Resp: 20 20 20 18   Temp:   98.8 °F (37.1 °C)    TempSrc:       SpO2:   98%    Weight:       Height:           CONSTITUTIONAL  General Appearance: casually dressed, appears stated age    MUSCULOSKELETAL  Abnormal Involuntary Movements: none noted    PSYCHIATRIC   Mental Status Exam:  Appearance: unremarkable, age appropriate  Behavior/Cooperation: normal, cooperative  Speech: normal tone, normal rate, normal pitch, normal volume  Mood: "alright"  Affect: normal  Thought Process: normal and logical  Thought Content: normal, no suicidality, no homicidality, delusions, or paranoia   Orientation: grossly intact  Memory: Grossly intact  Attention Span/Concentration: Normal  Insight: fair - demonstrates awareness of illness and situation  Judgment: appropriate for circumstances    ASSESSMENT:     DIAGNOSES & PROBLEMS  Opiate Use Disorder, severe    Patient Active Problem List   Diagnosis    Fracture of left zygomatic arch    Testicular injury         PLAN:       MANAGEMENT PLAN, TREATMENT GOALS, THERAPEUTIC TECHNIQUES/APPROACHES & CLINICAL REASONING    - continue pain management per primary with full-agonists; as patient has increased tolerance to opioids, he will likely require increased doses for adequate analgesia  - may treat opiate withdrawal symptoms as below:  Opiate Withdrawal Protocol:  clonidine 0.1 mg po q4 hours prn opiate withdrawal HTN  dicylomine 10 mg q6 hours prn abdominal muscle cramps  loperamide 2 mg q 1 hour prn diarrhea (max= 16 mg/24 hours)  methocarbamol 500 mg po q 6 hours prn muscle spasm  ibuprofen 400mg po q6hrs PRN " pain  zofran 4mg PO q8hrs PRN OR phenergan 12.5mg PO q8hrs PRN nausea  vistaril 50mg PO q8hrs PRN anxiety   - Once patient no longer requiring full-agonist opiates, may consider transition to suboxone, though patient would require outpatient follow-up to continue as MAT  - may start at 4mg-2mg BID once off of full-agonist and COWS >10    In cases of emergency, daily coverage provided by Acute/ER Psych MD, NP, or SW, with contact numbers located in [facility] On Call Schedule    Case discussed with staff addiction psychiatrist: MD Lorenzo Jade MD  [facility] PGY-II

## 2023-01-09 NOTE — PROGRESS NOTES
Patient seen for wound care consultation.   Reviewed chart for this encounter.   See Flow Sheet for findings.    Urology residents at bedside, normal saline to moisten the kerlix packing. Packing removed, Vashe soaked Kerlix gently packed into wound bed, ABD and mesh underwear to secure.   Wound vac was mentioned to the MD, pt stated he was homeless and cannot maintain that therapy. MD asked about primary closure and they stated, it was too soon, perhaps in a few days it could be done.     RECOMMENDATIONS:  Change packing BID, once per shift.  Wound vac if pt can tolerate and will remain in the hospital.  Primary closure with sutures as soon as possible for infection prevention.  Assure prior to pt departure that he is able to complete dressing changes himself.    Discussed POC with patient and primary RN.   See EMR for orders & patient education    Nursing to continue care.         01/09/23 0830   WOCN Assessment   WOCN Total Time (mins) 45   Visit Date 01/09/23   Visit Time 0830   Consult Type New   WOCN Speciality Wound   Wound surgical   Intervention assessed;changed;applied;chart review;coordination of care;team conference;orders;consult other service   Teaching on-going        Incision/Site 01/07/23 1020 Scrotum   Date First Assessed/Time First Assessed: 01/07/23 1020   Location: Scrotum   Wound Image    Dressing Appearance Moist drainage;Intact   Drainage Amount Small   Drainage Characteristics/Odor Serosanguineous   Appearance Pink   Red (%), Wound Tissue Color 100 %   Periwound Area Intact;Edematous   Wound Length (cm) 6.7 cm   Wound Width (cm) 2 cm   Wound Depth (cm) 4 cm   Wound Volume (cm^3) 53.6 cm^3   Wound Surface Area (cm^2) 13.4 cm^2   Care Cleansed with:;Antimicrobial agent   Dressing Removed;Applied;Rolled gauze   Packing packing removed;packed with;other (see comment)  (kerlix soaked with vashe)   Periwound Care Dry periwound area maintained   Dressing Change Due 01/10/23

## 2023-01-09 NOTE — PLAN OF CARE
Pt a&o 4, pain well controlled, all safety precautions in place, no concerns at this time, ATB's given, diet was adequate.  Problem: Adult Inpatient Plan of Care  Goal: Plan of Care Review  Outcome: Ongoing, Progressing  Goal: Patient-Specific Goal (Individualized)  Outcome: Ongoing, Progressing  Goal: Absence of Hospital-Acquired Illness or Injury  Outcome: Ongoing, Progressing  Goal: Optimal Comfort and Wellbeing  Outcome: Ongoing, Progressing  Goal: Readiness for Transition of Care  Outcome: Ongoing, Progressing     Problem: Infection  Goal: Absence of Infection Signs and Symptoms  Outcome: Ongoing, Progressing     Problem: Fall Injury Risk  Goal: Absence of Fall and Fall-Related Injury  Outcome: Ongoing, Progressing

## 2023-01-09 NOTE — SUBJECTIVE & OBJECTIVE
Interval History: AF overnight. Scrotal culture growing GNRs. Patient refusing lab draw this morning.      Objective:     Temp:  [97.6 °F (36.4 °C)-100.3 °F (37.9 °C)] 98.9 °F (37.2 °C)  Pulse:  [63-88] 86  Resp:  [16-20] 20  SpO2:  [91 %-97 %] 97 %  BP: (105-131)/(56-83) 131/83     Body mass index is 24.03 kg/m².           Drains       None                   Physical Exam  Vitals reviewed.   Constitutional:       Appearance: He is well-developed. He is not diaphoretic.   HENT:      Head: Normocephalic and atraumatic.   Eyes:      General: No scleral icterus.  Cardiovascular:      Rate and Rhythm: Normal rate.   Pulmonary:      Effort: Pulmonary effort is normal. No respiratory distress.      Breath sounds: No stridor.   Abdominal:      General: There is no distension.      Palpations: Abdomen is soft.      Tenderness: There is no abdominal tenderness.   Genitourinary:     Comments: Scrotal wound open, packed with betadine soaked kerlix  Musculoskeletal:         General: Normal range of motion.      Cervical back: Normal range of motion.   Skin:     General: Skin is warm and dry.   Neurological:      Mental Status: He is alert.       Significant Labs:    BMP:  Recent Labs   Lab 01/07/23  0720 01/08/23  1003   * 131*   K 3.8 4.0   CL 96 98   CO2 23 22*   BUN 9 5*   CREATININE 0.7 0.6   CALCIUM 9.8 8.8         CBC:   Recent Labs   Lab 01/07/23  0720 01/08/23  1003   WBC 14.63* 8.43   HGB 11.7* 11.6*   HCT 36.3* 38.0*    327         Blood Culture:   Recent Labs   Lab 01/08/23  1003 01/08/23  1005   LABBLOO No Growth to date No Growth to date     Urine Culture: No results for input(s): LABURIN in the last 168 hours.  Urine Studies: No results for input(s): COLORU, APPEARANCEUA, PHUR, SPECGRAV, PROTEINUA, GLUCUA, KETONESU, BILIRUBINUA, OCCULTUA, NITRITE, UROBILINOGEN, LEUKOCYTESUR, RBCUA, WBCUA, BACTERIA, SQUAMEPITHEL, HYALINECASTS in the last 168 hours.    Invalid input(s): JONA Vallecillo  Imaging:  All pertinent imaging results/findings from the past 24 hours have been reviewed.

## 2023-01-09 NOTE — SUBJECTIVE & OBJECTIVE
Review of Systems   Constitutional:  Negative for appetite change, chills, diaphoresis, fatigue, fever and unexpected weight change.   HENT:  Negative for congestion, ear pain, sore throat and tinnitus.    Eyes:  Negative for pain, redness and visual disturbance.   Respiratory:  Negative for cough, shortness of breath and wheezing.    Cardiovascular:  Negative for chest pain, palpitations and leg swelling.   Gastrointestinal:  Negative for abdominal pain, constipation, diarrhea, rectal pain and vomiting.   Endocrine: Negative for cold intolerance and heat intolerance.   Genitourinary:  Positive for scrotal swelling and testicular pain. Negative for dysuria, flank pain, frequency, hematuria and urgency.   Musculoskeletal:  Negative for arthralgias, back pain, myalgias and neck pain.   Skin:  Negative for rash.   Allergic/Immunologic: Negative for immunocompromised state.   Neurological:  Negative for dizziness, light-headedness, numbness and headaches.   Hematological:  Negative for adenopathy. Does not bruise/bleed easily.   Psychiatric/Behavioral:  Negative for confusion and sleep disturbance. The patient is not nervous/anxious.    Objective:     Vital Signs (Most Recent):  Temp: 98.8 °F (37.1 °C) (01/09/23 1703)  Pulse: 102 (01/09/23 1703)  Resp: 18 (01/09/23 1703)  BP: 131/69 (01/09/23 1703)  SpO2: (!) 94 % (01/09/23 1703)   Vital Signs (24h Range):  Temp:  [97.6 °F (36.4 °C)-98.9 °F (37.2 °C)] 98.8 °F (37.1 °C)  Pulse:  [] 102  Resp:  [18-20] 18  SpO2:  [91 %-98 %] 94 %  BP: (116-131)/(69-83) 131/69     Weight: 63.5 kg (140 lb)  Body mass index is 24.03 kg/m².    Estimated Creatinine Clearance: 110.4 mL/min (based on SCr of 0.7 mg/dL).    Physical Exam  Constitutional:       General: He is not in acute distress.     Appearance: He is well-developed. He is not toxic-appearing or diaphoretic.   HENT:      Head: Normocephalic and atraumatic.   Eyes:      General: No scleral icterus.     Conjunctiva/sclera:  Conjunctivae normal.   Cardiovascular:      Rate and Rhythm: Normal rate and regular rhythm.      Pulses: Normal pulses.      Heart sounds: Normal heart sounds. No murmur heard.  Pulmonary:      Effort: Pulmonary effort is normal.      Breath sounds: Normal breath sounds.   Abdominal:      General: Abdomen is flat. Bowel sounds are normal. There is no distension.      Palpations: Abdomen is soft.      Tenderness: There is no abdominal tenderness.   Musculoskeletal:         General: No swelling or tenderness. Normal range of motion.      Cervical back: Normal range of motion. No rigidity or tenderness.      Right lower leg: No edema.      Left lower leg: No edema.   Lymphadenopathy:      Cervical: No cervical adenopathy.   Skin:     General: Skin is warm and dry.      Findings: Erythema present.      Comments: Appropriate post-op swelling, redness, and pain. Scrotal surgical wound packing in place, c/d/I.    Neurological:      Mental Status: He is alert and oriented to person, place, and time. Mental status is at baseline.   Psychiatric:         Behavior: Behavior normal.         Thought Content: Thought content normal.       Significant Labs: All pertinent labs within the past 24 hours have been reviewed.    Significant Imaging: I have reviewed all pertinent imaging results/findings within the past 24 hours.

## 2023-01-09 NOTE — ASSESSMENT & PLAN NOTE
46yo, Male with Hx IVDU, presenting for scrotal injury with progressive pain and edema found to have R testicle impaired blood flow, and s/p OR I&D w/ R orchiectomy (01/07/23) as R testicle found non viable and with purulence; Intra op scrotal Cxs+ Kleb pneumo (panS).    Pt continuing on Iv-meropenem, started by hosp team; noting hx of PCN allergy (anaphylaxis).   Leukocytosis and fever resolved 01/09/23.   Blood cxs (01/08/23): NGTD  Labs show: Hep C + and HIV negative, G/C negative.    Recommendations:  1. Recommend discontinuing IV-Meropenem; and starting PO- Ciprofloxacin 750mg Q12h.  2. Anticipate 14d total abx course for SSTI of scrotum; s/p (01/07/23) surg date, tentative end-date (01/21/23).    3. Consider labs w/ HCV RNA for further eval of HepC ab positive (01/0723) w/o known hx of tx; and referral to Hepatology if HCV tx warranted and pt agreeable.    4. Recommend SW for housing assistance d/t homelessness.  5. Also, patient requests care assistance for detox therapy outpt.   6. Recommend Nicotine patches for tobacco dependence, as pt w/ agitated withdrawal.    -- Discussed with ID staff and primary team   -- ID will continue to follow w/ further recs.

## 2023-01-10 LAB
ANION GAP SERPL CALC-SCNC: 13 MMOL/L (ref 8–16)
BASOPHILS # BLD AUTO: 0.08 K/UL (ref 0–0.2)
BASOPHILS NFR BLD: 1.6 % (ref 0–1.9)
BUN SERPL-MCNC: 7 MG/DL (ref 6–20)
CALCIUM SERPL-MCNC: 9.5 MG/DL (ref 8.7–10.5)
CHLORIDE SERPL-SCNC: 102 MMOL/L (ref 95–110)
CO2 SERPL-SCNC: 19 MMOL/L (ref 23–29)
CREAT SERPL-MCNC: 0.8 MG/DL (ref 0.5–1.4)
DIFFERENTIAL METHOD: ABNORMAL
EOSINOPHIL # BLD AUTO: 0.1 K/UL (ref 0–0.5)
EOSINOPHIL NFR BLD: 2.1 % (ref 0–8)
ERYTHROCYTE [DISTWIDTH] IN BLOOD BY AUTOMATED COUNT: 13.5 % (ref 11.5–14.5)
EST. GFR  (NO RACE VARIABLE): >60 ML/MIN/1.73 M^2
GLUCOSE SERPL-MCNC: 109 MG/DL (ref 70–110)
HCT VFR BLD AUTO: 40.4 % (ref 40–54)
HGB BLD-MCNC: 12.8 G/DL (ref 14–18)
IMM GRANULOCYTES # BLD AUTO: 0.04 K/UL (ref 0–0.04)
IMM GRANULOCYTES NFR BLD AUTO: 0.8 % (ref 0–0.5)
LYMPHOCYTES # BLD AUTO: 1.8 K/UL (ref 1–4.8)
LYMPHOCYTES NFR BLD: 35 % (ref 18–48)
MCH RBC QN AUTO: 25.5 PG (ref 27–31)
MCHC RBC AUTO-ENTMCNC: 31.7 G/DL (ref 32–36)
MCV RBC AUTO: 81 FL (ref 82–98)
MONOCYTES # BLD AUTO: 0.4 K/UL (ref 0.3–1)
MONOCYTES NFR BLD: 7.6 % (ref 4–15)
NEUTROPHILS # BLD AUTO: 2.7 K/UL (ref 1.8–7.7)
NEUTROPHILS NFR BLD: 52.9 % (ref 38–73)
NRBC BLD-RTO: 0 /100 WBC
PLATELET # BLD AUTO: 318 K/UL (ref 150–450)
PMV BLD AUTO: 11.2 FL (ref 9.2–12.9)
POTASSIUM SERPL-SCNC: 4.1 MMOL/L (ref 3.5–5.1)
RBC # BLD AUTO: 5.02 M/UL (ref 4.6–6.2)
SODIUM SERPL-SCNC: 134 MMOL/L (ref 136–145)
WBC # BLD AUTO: 5.14 K/UL (ref 3.9–12.7)

## 2023-01-10 PROCEDURE — 25000003 PHARM REV CODE 250: Performed by: STUDENT IN AN ORGANIZED HEALTH CARE EDUCATION/TRAINING PROGRAM

## 2023-01-10 PROCEDURE — 20600001 HC STEP DOWN PRIVATE ROOM

## 2023-01-10 PROCEDURE — 36415 COLL VENOUS BLD VENIPUNCTURE: CPT | Performed by: STUDENT IN AN ORGANIZED HEALTH CARE EDUCATION/TRAINING PROGRAM

## 2023-01-10 PROCEDURE — 63600175 PHARM REV CODE 636 W HCPCS: Performed by: STUDENT IN AN ORGANIZED HEALTH CARE EDUCATION/TRAINING PROGRAM

## 2023-01-10 PROCEDURE — 99233 PR SUBSEQUENT HOSPITAL CARE,LEVL III: ICD-10-PCS | Mod: ,,, | Performed by: STUDENT IN AN ORGANIZED HEALTH CARE EDUCATION/TRAINING PROGRAM

## 2023-01-10 PROCEDURE — 99233 SBSQ HOSP IP/OBS HIGH 50: CPT | Mod: ,,, | Performed by: STUDENT IN AN ORGANIZED HEALTH CARE EDUCATION/TRAINING PROGRAM

## 2023-01-10 PROCEDURE — 85025 COMPLETE CBC W/AUTO DIFF WBC: CPT | Performed by: STUDENT IN AN ORGANIZED HEALTH CARE EDUCATION/TRAINING PROGRAM

## 2023-01-10 PROCEDURE — 80048 BASIC METABOLIC PNL TOTAL CA: CPT | Performed by: STUDENT IN AN ORGANIZED HEALTH CARE EDUCATION/TRAINING PROGRAM

## 2023-01-10 PROCEDURE — S4991 NICOTINE PATCH NONLEGEND: HCPCS | Performed by: STUDENT IN AN ORGANIZED HEALTH CARE EDUCATION/TRAINING PROGRAM

## 2023-01-10 RX ORDER — HYDROMORPHONE HYDROCHLORIDE 1 MG/ML
1 INJECTION, SOLUTION INTRAMUSCULAR; INTRAVENOUS; SUBCUTANEOUS ONCE
Status: COMPLETED | OUTPATIENT
Start: 2023-01-10 | End: 2023-01-10

## 2023-01-10 RX ADMIN — HYDROMORPHONE HYDROCHLORIDE 0.5 MG: 1 INJECTION, SOLUTION INTRAMUSCULAR; INTRAVENOUS; SUBCUTANEOUS at 07:01

## 2023-01-10 RX ADMIN — ACETAMINOPHEN 1000 MG: 500 TABLET ORAL at 11:01

## 2023-01-10 RX ADMIN — HYDROMORPHONE HYDROCHLORIDE 0.5 MG: 1 INJECTION, SOLUTION INTRAMUSCULAR; INTRAVENOUS; SUBCUTANEOUS at 01:01

## 2023-01-10 RX ADMIN — OXYCODONE HYDROCHLORIDE 15 MG: 10 TABLET ORAL at 03:01

## 2023-01-10 RX ADMIN — HYDROXYZINE PAMOATE 50 MG: 50 CAPSULE ORAL at 11:01

## 2023-01-10 RX ADMIN — OXYCODONE HYDROCHLORIDE 15 MG: 10 TABLET ORAL at 11:01

## 2023-01-10 RX ADMIN — CIPROFLOXACIN HYDROCHLORIDE 750 MG: 500 TABLET, FILM COATED ORAL at 08:01

## 2023-01-10 RX ADMIN — CIPROFLOXACIN HYDROCHLORIDE 750 MG: 500 TABLET, FILM COATED ORAL at 09:01

## 2023-01-10 RX ADMIN — HYDROMORPHONE HYDROCHLORIDE 1 MG: 1 INJECTION, SOLUTION INTRAMUSCULAR; INTRAVENOUS; SUBCUTANEOUS at 10:01

## 2023-01-10 RX ADMIN — BUSPIRONE HYDROCHLORIDE 5 MG: 5 TABLET ORAL at 11:01

## 2023-01-10 RX ADMIN — OXYCODONE HYDROCHLORIDE 15 MG: 10 TABLET ORAL at 04:01

## 2023-01-10 RX ADMIN — Medication 1 PATCH: at 04:01

## 2023-01-10 RX ADMIN — BUSPIRONE HYDROCHLORIDE 5 MG: 5 TABLET ORAL at 08:01

## 2023-01-10 RX ADMIN — OXYCODONE HYDROCHLORIDE 10 MG: 10 TABLET ORAL at 08:01

## 2023-01-10 RX ADMIN — Medication 6 MG: at 08:01

## 2023-01-10 RX ADMIN — ACETAMINOPHEN 1000 MG: 500 TABLET ORAL at 05:01
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
## 2023-01-10 NOTE — CHAPLAIN
Received a referral from Katherine Ferreira RN (wound care). Katherine provided pt history.  attempted visit w/pt who appeared open to visit yet was very sleepy as his eyes kept closing. Pt would like f/up following some rest.  remains available and will f/up.     Chaplain Go,   Spiritual Care  *[phone]

## 2023-01-10 NOTE — ASSESSMENT & PLAN NOTE
48yo, Male with Hx IVDU, presenting for scrotal injury with progressive pain and edema found to have R testicle impaired blood flow, and s/p OR I&D w/ R orchiectomy (01/07/23) as R testicle found non viable and with purulence; Intra op scrotal Cxs+ Kleb pneumo (panS).    Pt transitioned to oral Cipro 750mg Q12h, tolerating well. Leukocytosis and fever remains resolved since 01/09/23.   Blood cxs (01/08/23): NGTD  Labs show: Hep C Ab+ ;  HIV negative, G/C negative.      Recommendations:  1. Recommend continuing PO- Ciprofloxacin 750mg Q12h.  2. Recommend 14d total abx duration for SSTI of scrotum; s/p (01/07/23) surg date, tentative end-date (01/21/23).    3. Consider labs w/ HCV RNA for further eval of HepC ab positive (01/0723) no known hx of tx; and referral to Hepatology if HCV tx warranted and pt agreeable.    4. Recommend SW for housing assistance d/t homelessness.  5. Also, patient requests care assistance for detox therapy outpt.   6. Recommend Nicotine patches for tobacco dependence, as pt w/ agitated withdrawal.  7. Continue to optimize pain management if possible.    -- ID will sign-off, and schedule pt for f/u appt in ID clinic.  -- Discussed with ID staff and primary team

## 2023-01-10 NOTE — PLAN OF CARE
END OF SHIFT NOTE  Pt rested well throughout shift, complaints of pain, with PRN meds given. Scrotal packing intact, Ambulatory, leaves the unit frequently, no distress at this time, VSS, bed in lowest position, side rails up x2. Call light within reach,  personal items within reach.       CHATO Blackwell RN         Problem: Adult Inpatient Plan of Care  Goal: Plan of Care Review  Outcome: Ongoing, Progressing  Goal: Patient-Specific Goal (Individualized)  Outcome: Ongoing, Progressing  Goal: Absence of Hospital-Acquired Illness or Injury  Outcome: Ongoing, Progressing  Goal: Optimal Comfort and Wellbeing  Outcome: Ongoing, Progressing  Goal: Readiness for Transition of Care  Outcome: Ongoing, Progressing     Problem: Infection  Goal: Absence of Infection Signs and Symptoms  Outcome: Ongoing, Progressing     Problem: Fall Injury Risk  Goal: Absence of Fall and Fall-Related Injury  Outcome: Ongoing, Progressing

## 2023-01-10 NOTE — SUBJECTIVE & OBJECTIVE
Review of Systems   Constitutional:  Negative for appetite change, chills, diaphoresis, fatigue, fever and unexpected weight change.   HENT:  Negative for congestion, ear pain, sore throat and tinnitus.    Eyes:  Negative for pain, redness and visual disturbance.   Respiratory:  Negative for cough, shortness of breath and wheezing.    Cardiovascular:  Negative for chest pain, palpitations and leg swelling.   Gastrointestinal:  Negative for abdominal pain, constipation, diarrhea, rectal pain and vomiting.   Endocrine: Negative for cold intolerance and heat intolerance.   Genitourinary:  Positive for scrotal swelling and testicular pain. Negative for dysuria, flank pain, frequency, hematuria and urgency.   Musculoskeletal:  Negative for arthralgias, back pain, myalgias and neck pain.   Skin:  Negative for rash.   Allergic/Immunologic: Negative for immunocompromised state.   Neurological:  Negative for dizziness, light-headedness, numbness and headaches.   Hematological:  Negative for adenopathy. Does not bruise/bleed easily.   Psychiatric/Behavioral:  Negative for confusion and sleep disturbance. The patient is not nervous/anxious.    Objective:     Vital Signs (Most Recent):  Temp: 98.5 °F (36.9 °C) (01/10/23 1515)  Pulse: 88 (01/10/23 1515)  Resp: 18 (01/10/23 1607)  BP: 120/74 (01/10/23 1515)  SpO2: 96 % (01/10/23 1515) Vital Signs (24h Range):  Temp:  [96.2 °F (35.7 °C)-98.8 °F (37.1 °C)] 98.5 °F (36.9 °C)  Pulse:  [] 88  Resp:  [16-20] 18  SpO2:  [94 %-100 %] 96 %  BP: (100-132)/(59-81) 120/74     Weight: 63.5 kg (140 lb)  Body mass index is 24.03 kg/m².    Estimated Creatinine Clearance: 96.6 mL/min (based on SCr of 0.8 mg/dL).    Physical Exam  Constitutional:       General: He is not in acute distress.     Appearance: He is well-developed. He is not toxic-appearing or diaphoretic.   HENT:      Head: Normocephalic and atraumatic.   Eyes:      General: No scleral icterus.     Conjunctiva/sclera:  Conjunctivae normal.   Cardiovascular:      Rate and Rhythm: Normal rate and regular rhythm.      Pulses: Normal pulses.      Heart sounds: Normal heart sounds. No murmur heard.  Pulmonary:      Effort: Pulmonary effort is normal.      Breath sounds: Normal breath sounds.   Abdominal:      General: Abdomen is flat. Bowel sounds are normal. There is no distension.      Palpations: Abdomen is soft.      Tenderness: There is no abdominal tenderness.   Musculoskeletal:         General: No swelling or tenderness. Normal range of motion.      Cervical back: Normal range of motion. No rigidity or tenderness.      Right lower leg: No edema.      Left lower leg: No edema.   Lymphadenopathy:      Cervical: No cervical adenopathy.   Skin:     General: Skin is warm and dry.      Findings: Erythema present.      Comments: Appropriate post-op swelling, redness, and pain. Scrotal surgical wound packing in place, c/d/I.    Neurological:      Mental Status: He is alert and oriented to person, place, and time. Mental status is at baseline.   Psychiatric:         Behavior: Behavior normal.         Thought Content: Thought content normal.       Significant Labs: All pertinent labs within the past 24 hours have been reviewed.    Significant Imaging: I have reviewed all pertinent imaging results/findings within the past 24 hours.
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
## 2023-01-10 NOTE — ASSESSMENT & PLAN NOTE
Endy Bruno is a 48 yo M with a history IVDU presenting for scrotal pain and swelling. Urology was consulted for scrotal u/s w/o blood flow on the right side.    - s/p scrotal exploration, right orchiectomy, scrotal washout  - wound care consulted for wound packing, appreciate assistance  - Packing change today after dilaudid, will consider wound vac  - ID consulted, appreciate recs  - Po Cipro BID  - Culture growing Klebsiella  - psychiatry consulted for withdrawal, appreciate recs  - Appreciate social work's assistance with discharge

## 2023-01-10 NOTE — PLAN OF CARE
Pt a&o4, all safety precautions in place, pain has been manageable with medication available, MD/wound care changed scrotum dressing today, no other concerns at this time.  Problem: Adult Inpatient Plan of Care  Goal: Plan of Care Review  Outcome: Ongoing, Progressing  Goal: Patient-Specific Goal (Individualized)  Outcome: Ongoing, Progressing  Goal: Absence of Hospital-Acquired Illness or Injury  Outcome: Ongoing, Progressing  Goal: Optimal Comfort and Wellbeing  Outcome: Ongoing, Progressing  Goal: Readiness for Transition of Care  Outcome: Ongoing, Progressing     Problem: Infection  Goal: Absence of Infection Signs and Symptoms  Outcome: Ongoing, Progressing     Problem: Fall Injury Risk  Goal: Absence of Fall and Fall-Related Injury  Outcome: Ongoing, Progressing

## 2023-01-10 NOTE — PLAN OF CARE
CM sent 6 LTAC referrals in total   [facility]: denied the patient  [facility]: denied the patient  [facility] acute [facility] - [facility]: undetermined     Other 3 facilities never answered        01/10/23 1556   Post-Acute Status   Post-Acute Authorization Placement   Post-Acute Placement Status Referrals Sent     Vannessa Carson RN, CM   Ext: [phone]

## 2023-01-10 NOTE — CONSULTS
Inpatient consult to Physical Medicine Rehab  Consult performed by: Vika Crawford NP  Consult ordered by: Liyah Shetty MD  Reason for consult: Debility    Reviewed patient history and current admission. Patient is planning for shelter. Please reconsult for any changes. Thank you.    EVGENY Blackwell, FNP-C  Physical Medicine & Rehabilitation   01/09/2023

## 2023-01-10 NOTE — PROGRESS NOTES
Wound care follow-up, MD to bedside to assess pt during dressing change.    Pt found in bed, agreeable to care at this time.   Previous dressing takedown. Wound care education started w/ hands on training completed by pt. Pt was able to utilize NS flushes to remove previous packing w/ gloved hands, unable to complete the rest of the dressing d/t discomfort and anxiety. Kerlix moistened w/ Vashe packed into scrotal incision, abd pad placed over top and mesh briefs utilized for securement.    RECOMMENDATIONS     Continue w/ current plan of care  Daily dressing changes to be performed w/ wound care and Urology MD at bedside  Continue w/ hands on pt dressing education  Spiritual care      01/10/23 1145   WOCN Assessment   WOCN Total Time (mins) 30   Visit Date 01/10/23   Visit Time 1145   Consult Type Follow Up   WOCN Speciality Wound   Wound surgical   Intervention assessed;changed;coordination of care   Teaching on-going        Incision/Site 01/07/23 1020 Scrotum   Date First Assessed/Time First Assessed: 01/07/23 1020   Location: Scrotum   Incision WDL WDL   Dressing Appearance Moist drainage   Drainage Amount Small   Drainage Characteristics/Odor Serosanguineous   Appearance Pink;Red   Red (%), Wound Tissue Color 100 %   Periwound Area Intact;Edematous   Care Cleansed with:;Antimicrobial agent   Dressing Changed;Gauze, wet to dry;Absorptive Pad   Dressing Change Due 01/11/23

## 2023-01-10 NOTE — SUBJECTIVE & OBJECTIVE
Interval History: NAEO. AFVSS. Cultures resulted, transitioned to po Cipro.     Review of Systems  Objective:     Temp:  [96.2 °F (35.7 °C)-98.8 °F (37.1 °C)] 97.9 °F (36.6 °C)  Pulse:  [] 92  Resp:  [16-20] 20  SpO2:  [94 %-99 %] 99 %  BP: (100-132)/(59-82) 100/59     Body mass index is 24.03 kg/m².           Drains       None                   Physical Exam  Vitals reviewed.   Constitutional:       Appearance: He is well-developed. He is not diaphoretic.   HENT:      Head: Normocephalic and atraumatic.   Eyes:      General: No scleral icterus.  Cardiovascular:      Rate and Rhythm: Normal rate.   Pulmonary:      Effort: Pulmonary effort is normal. No respiratory distress.      Breath sounds: No stridor.   Abdominal:      General: There is no distension.      Palpations: Abdomen is soft.      Tenderness: There is no abdominal tenderness.   Genitourinary:     Comments: Scrotal wound open, packed with betadine soaked kerlix  Musculoskeletal:         General: Normal range of motion.      Cervical back: Normal range of motion.   Skin:     General: Skin is warm and dry.   Neurological:      Mental Status: He is alert.       Significant Labs:    BMP:  Recent Labs   Lab 01/08/23  1003 01/09/23  0916 01/10/23  0431   * 132* 134*   K 4.0 3.9 4.1   CL 98 101 102   CO2 22* 21* 19*   BUN 5* 6 7   CREATININE 0.6 0.7 0.8   CALCIUM 8.8 9.1 9.5       CBC:   Recent Labs   Lab 01/07/23  0720 01/08/23  1003 01/09/23  0916   WBC 14.63* 8.43 4.62   HGB 11.7* 11.6* 12.7*   HCT 36.3* 38.0* 39.0*    327 335       Urine Studies: No results for input(s): COLORU, APPEARANCEUA, PHUR, SPECGRAV, PROTEINUA, GLUCUA, KETONESU, BILIRUBINUA, OCCULTUA, NITRITE, UROBILINOGEN, LEUKOCYTESUR, RBCUA, WBCUA, BACTERIA, SQUAMEPITHEL, HYALINECASTS in the last 168 hours.    Invalid input(s): WRIGHTSUR    Significant Imaging:  All pertinent imaging results/findings from the past 24 hours have been reviewed.

## 2023-01-11 LAB
ANION GAP SERPL CALC-SCNC: 11 MMOL/L (ref 8–16)
BACTERIA SPEC ANAEROBE CULT: NORMAL
BASOPHILS # BLD AUTO: 0.08 K/UL (ref 0–0.2)
BASOPHILS NFR BLD: 0.8 % (ref 0–1.9)
BUN SERPL-MCNC: 13 MG/DL (ref 6–20)
CALCIUM SERPL-MCNC: 9.5 MG/DL (ref 8.7–10.5)
CHLORIDE SERPL-SCNC: 106 MMOL/L (ref 95–110)
CO2 SERPL-SCNC: 23 MMOL/L (ref 23–29)
CREAT SERPL-MCNC: 1.6 MG/DL (ref 0.5–1.4)
DIFFERENTIAL METHOD: ABNORMAL
EOSINOPHIL # BLD AUTO: 0.2 K/UL (ref 0–0.5)
EOSINOPHIL NFR BLD: 2 % (ref 0–8)
ERYTHROCYTE [DISTWIDTH] IN BLOOD BY AUTOMATED COUNT: 13.7 % (ref 11.5–14.5)
EST. GFR  (NO RACE VARIABLE): 53.5 ML/MIN/1.73 M^2
GLUCOSE SERPL-MCNC: 102 MG/DL (ref 70–110)
HCT VFR BLD AUTO: 34.9 % (ref 40–54)
HGB BLD-MCNC: 11.3 G/DL (ref 14–18)
IMM GRANULOCYTES # BLD AUTO: 0.06 K/UL (ref 0–0.04)
IMM GRANULOCYTES NFR BLD AUTO: 0.6 % (ref 0–0.5)
LYMPHOCYTES # BLD AUTO: 2.7 K/UL (ref 1–4.8)
LYMPHOCYTES NFR BLD: 26.1 % (ref 18–48)
MCH RBC QN AUTO: 26 PG (ref 27–31)
MCHC RBC AUTO-ENTMCNC: 32.4 G/DL (ref 32–36)
MCV RBC AUTO: 80 FL (ref 82–98)
MONOCYTES # BLD AUTO: 0.8 K/UL (ref 0.3–1)
MONOCYTES NFR BLD: 7.2 % (ref 4–15)
NEUTROPHILS # BLD AUTO: 6.6 K/UL (ref 1.8–7.7)
NEUTROPHILS NFR BLD: 63.3 % (ref 38–73)
NRBC BLD-RTO: 0 /100 WBC
PLATELET # BLD AUTO: 401 K/UL (ref 150–450)
PMV BLD AUTO: 10.1 FL (ref 9.2–12.9)
POTASSIUM SERPL-SCNC: 3.9 MMOL/L (ref 3.5–5.1)
RBC # BLD AUTO: 4.35 M/UL (ref 4.6–6.2)
SODIUM SERPL-SCNC: 140 MMOL/L (ref 136–145)
WBC # BLD AUTO: 10.42 K/UL (ref 3.9–12.7)

## 2023-01-11 PROCEDURE — 20600001 HC STEP DOWN PRIVATE ROOM

## 2023-01-11 PROCEDURE — 25000003 PHARM REV CODE 250: Performed by: STUDENT IN AN ORGANIZED HEALTH CARE EDUCATION/TRAINING PROGRAM

## 2023-01-11 PROCEDURE — 80048 BASIC METABOLIC PNL TOTAL CA: CPT | Performed by: STUDENT IN AN ORGANIZED HEALTH CARE EDUCATION/TRAINING PROGRAM

## 2023-01-11 PROCEDURE — 99232 SBSQ HOSP IP/OBS MODERATE 35: CPT | Mod: ,,, | Performed by: PSYCHIATRY & NEUROLOGY

## 2023-01-11 PROCEDURE — 36415 COLL VENOUS BLD VENIPUNCTURE: CPT | Performed by: STUDENT IN AN ORGANIZED HEALTH CARE EDUCATION/TRAINING PROGRAM

## 2023-01-11 PROCEDURE — 99232 PR SUBSEQUENT HOSPITAL CARE,LEVL II: ICD-10-PCS | Mod: ,,, | Performed by: PSYCHIATRY & NEUROLOGY

## 2023-01-11 PROCEDURE — 63600175 PHARM REV CODE 636 W HCPCS: Performed by: STUDENT IN AN ORGANIZED HEALTH CARE EDUCATION/TRAINING PROGRAM

## 2023-01-11 PROCEDURE — 85025 COMPLETE CBC W/AUTO DIFF WBC: CPT | Performed by: STUDENT IN AN ORGANIZED HEALTH CARE EDUCATION/TRAINING PROGRAM

## 2023-01-11 RX ADMIN — OXYCODONE HYDROCHLORIDE 15 MG: 10 TABLET ORAL at 10:01

## 2023-01-11 RX ADMIN — HYDROMORPHONE HYDROCHLORIDE 0.5 MG: 1 INJECTION, SOLUTION INTRAMUSCULAR; INTRAVENOUS; SUBCUTANEOUS at 08:01

## 2023-01-11 RX ADMIN — HYDROMORPHONE HYDROCHLORIDE 0.5 MG: 1 INJECTION, SOLUTION INTRAMUSCULAR; INTRAVENOUS; SUBCUTANEOUS at 02:01

## 2023-01-11 RX ADMIN — CIPROFLOXACIN HYDROCHLORIDE 750 MG: 500 TABLET, FILM COATED ORAL at 08:01

## 2023-01-11 RX ADMIN — ACETAMINOPHEN 1000 MG: 500 TABLET ORAL at 06:01

## 2023-01-11 RX ADMIN — ACETAMINOPHEN 1000 MG: 500 TABLET ORAL at 11:01

## 2023-01-11 RX ADMIN — OXYCODONE HYDROCHLORIDE 15 MG: 10 TABLET ORAL at 06:01

## 2023-01-11 RX ADMIN — BUSPIRONE HYDROCHLORIDE 5 MG: 5 TABLET ORAL at 08:01
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
## 2023-01-11 NOTE — PROGRESS NOTES
"Per night shift report, she had suspicions of pt possibly "being high" based on the way the patient was acting and his VS (BP being in the 90'/50's). I assessed the pt and he seemed okay this morning. BP was stable. I gave him pain medication through IV. The patient then stepped outside to smoke even though being directed not to because wound care was coming to do his dressing change. He goes outside frequently and I have communicated this to my charge nurses. I passed along the concerns from last night to the MD's when they arrived and via messaging. I also communicated with my charge nurse the concern, however we are not able to tell the patient he can't leave the floor. The wound nurses also found what a tylenol and ciprofloxacin pill in his bed. The patient claims this happened during the night (1/10) when he was too tired and missed his mouth when taking the pills. The pt claims he was very tired from new anxiety meds given yesterday (1/10). The pt had dressing change done and is safely in his room now. No other concerns at the time. MD's were at bedside.   "

## 2023-01-11 NOTE — PROGRESS NOTES
Wound care follow up for scrotal dressing change, urology MD at bedside to assess.     Pt found in bed, agreeable to care at this time. Dressing change completed by pt today w/ verbal instruction. Pt utilized NS flushes to remove old packing. Vashe moistened gauze packed into incision, abd pad for coverage, secured w/ mesh briefs.    Recommendations -   Continue w/ wound care education w/ pt  Daily dressing changes w/ urology MD, wound care.     01/11/23 1045   WOCN Assessment   WOCN Total Time (mins) 30   Visit Date 01/11/23   Visit Time 1045   Consult Type Follow Up   WOCN Speciality Wound   Wound surgical   Intervention assessed;changed;applied;chart review;coordination of care;team conference   Teaching on-going        Incision/Site 01/07/23 1020 Scrotum   Date First Assessed/Time First Assessed: 01/07/23 1020   Location: Scrotum   Wound Image    Incision WDL WDL   Dressing Appearance Moist drainage   Drainage Amount Scant   Drainage Characteristics/Odor Serosanguineous   Appearance Pink;Red   Red (%), Wound Tissue Color 100 %   Periwound Area Intact;Edematous   Wound Length (cm) 5 cm   Wound Width (cm) 1.9 cm   Wound Depth (cm) 3 cm   Wound Volume (cm^3) 28.5 cm^3   Wound Surface Area (cm^2) 9.5 cm^2   Care Cleansed with:;Sterile normal saline;Applied:;Other (see comments)  (kerlix w/ vashe, abd, mesh briefs)   Dressing Changed;Gauze;Absorptive Pad        100.4

## 2023-01-11 NOTE — CHAPLAIN
" spent 30 minutes w/pt.   pt is struggling to deal with damage to his scrotum, feeling that the loss makes him less whole. during the visit the pt became tearful, "I'm a good person, I never hurt nobody, why did this have to happen to me"?  affirmed pt and processed feelings of retaliation and resentment. Pt very receptive to  presence. pt finds meaning in smitha and helping others. pt is currently homeless and mentioned his adult son during the converstion. pt cherishes his crucifix necklace and agreed to allow House  to visit.  also remains available.  "

## 2023-01-11 NOTE — PROGRESS NOTES
"ADDICTION CONSULT FOLLOW UP VISIT     DEPARTMENT:  Psychiatry  SITE: [facility], [address]    DATE OF ADMISSION: 1/7/2023  3:56 AM  LENGTH OF STAY: 4 days    EXAMINING PRACTITIONER: Lorenzo Murguia      SUBJECTIVE:     HISTORY    Patient Name: Endy Bruno  YOB: 1975    CHIEF COMPLAINT   Endy Bruno is a 47 y.o. male who is being seen today for a follow up visit by the addiction psychiatry consult service.  Endy Bruno presents with the chief complaint of: testicular injury s/p R orchiectomy and scrotal washout.    HPI & PSYCHIATRIC ROS  Patient resting in bed comfortably this morning, states that he currently feels well and that his pain has been under good control with current regimen. Currently still on full agonists for pain control. Discussed that resources to be provided for suboxone providers when patient no longer requiring use of full agonist. No SI, HI, AVH at this time. Per chart review, [facility] to [facility].    PFSH: The patient's past medical history has been reviewed and updated as appropriate within the electronic medical record system.    PSYCHOTROPIC MEDICATIONS   Vistaril 50mg q8h PRN for anxiety      OBJECTIVE:     EXAMINATION    Vitals:    01/11/23 0747 01/11/23 0802 01/11/23 0930 01/11/23 1037   BP: (!) 122/59  (!) 95/50 (!) 115/59   BP Location: Right arm  Right arm    Patient Position: Lying  Lying    Pulse: 86  86 97   Resp: 18 18 18 18   Temp: 98.2 °F (36.8 °C)      TempSrc: Oral      SpO2: 98%  98% 98%   Weight:       Height:           CONSTITUTIONAL  General Appearance: casually dressed, appears stated age    MUSCULOSKELETAL  Abnormal Involuntary Movements: none noted    PSYCHIATRIC   Mental Status Exam:  Appearance: unremarkable, age appropriate  Behavior/Cooperation: normal, cooperative  Speech: normal tone, normal rate, normal pitch, normal volume  Mood: "pretty good"  Affect: normal  Thought Process: normal and logical  Thought Content: normal, no " suicidality, no homicidality, delusions, or paranoia   Orientation: grossly intact  Memory: Grossly intact  Attention Span/Concentration: Normal  Insight: fair - demonstrates awareness of illness and situation  Judgment: appropriate for circumstances    ASSESSMENT:     DIAGNOSES & PROBLEMS  Opiate Use Disorder, severe    Patient Active Problem List   Diagnosis    Fracture of left zygomatic arch    Testicular injury    Testicular abscess         PLAN:       MANAGEMENT PLAN, TREATMENT GOALS, THERAPEUTIC TECHNIQUES/APPROACHES & CLINICAL REASONING    - continue pain management per primary with full-agonists; as patient has increased tolerance to opioids, he will likely require increased doses for adequate analgesia  - may treat opiate withdrawal symptoms as below:  Opiate Withdrawal Protocol:  clonidine 0.1 mg po q4 hours prn opiate withdrawal HTN  dicylomine 10 mg q6 hours prn abdominal muscle cramps  loperamide 2 mg q 1 hour prn diarrhea (max= 16 mg/24 hours)  methocarbamol 500 mg po q 6 hours prn muscle spasm  ibuprofen 400mg po q6hrs PRN pain  zofran 4mg PO q8hrs PRN OR phenergan 12.5mg PO q8hrs PRN nausea  vistaril 50mg PO q8hrs PRN anxiety   - patient provided with resources for suboxone providers upon discharge    In cases of emergency, daily coverage provided by Acute/ER Psych MD, NP, or SW, with contact numbers located in [facility] On Call Schedule    Case discussed with staff addiction psychiatrist: Brandon Victoria MD    Thank you for the consult. Addiction psychiatry will sign off at this time. Please call or page for further questions or concerns.    Lorenzo Murguia MD  [facility] PGY-II

## 2023-01-11 NOTE — ASSESSMENT & PLAN NOTE
Endy Bruno is a 46 yo M with a history IVDU presenting for scrotal pain and swelling. Urology was consulted for scrotal u/s w/o blood flow on the right side.    - s/p scrotal exploration, right orchiectomy, scrotal washout  - wound care consulted for wound packing, appreciate assistance  - Will consider wound vac  - ID consulted, appreciate recs  - Po Cipro BID  - Culture growing Klebsiella  - psychiatry consulted for withdrawal, appreciate recs  - Appreciate social work's assistance with discharge, stable for discharge at this time, will work on placement

## 2023-01-11 NOTE — NURSING
END OF SHIFT NOTE  Pt rested well throughout shift, no distress at this time, complaints of pain, hypotensive around MN, bp 90/50's, other  VSS, bed in lowest position, side rails up x2. Significant other at bedside for some of the shift, call light within reach, personal items within reach.       Birdie Quiroz, TEDN RN

## 2023-01-11 NOTE — SUBJECTIVE & OBJECTIVE
Interval History: NAEO.  AFVSS.  Cr 1.6 from 0.8.  Cx taken 1/7 from scrotal abscess growing Klebsiella.  Currently on cipro.  Still having significant pain with packing changes.      Review of Systems  Objective:     Temp:  [97.5 °F (36.4 °C)-98.8 °F (37.1 °C)] 97.5 °F (36.4 °C)  Pulse:  [] 96  Resp:  [16-20] 16  SpO2:  [96 %-100 %] 99 %  BP: ()/(50-75) 96/54     Body mass index is 24.03 kg/m².           Drains       None                   Physical Exam  Vitals reviewed.   Constitutional:       Appearance: He is well-developed. He is not diaphoretic.   HENT:      Head: Normocephalic and atraumatic.   Eyes:      General: No scleral icterus.  Cardiovascular:      Rate and Rhythm: Normal rate.   Pulmonary:      Effort: Pulmonary effort is normal. No respiratory distress.      Breath sounds: No stridor.   Abdominal:      General: There is no distension.      Palpations: Abdomen is soft.      Tenderness: There is no abdominal tenderness.   Genitourinary:     Comments: Scrotal wound open, packed with betadine soaked kerlix  Musculoskeletal:         General: Normal range of motion.      Cervical back: Normal range of motion.   Skin:     General: Skin is warm and dry.   Neurological:      Mental Status: He is alert.       Significant Labs:    BMP:  Recent Labs   Lab 01/09/23  0916 01/10/23  0431 01/11/23 0315   * 134* 140   K 3.9 4.1 3.9    102 106   CO2 21* 19* 23   BUN 6 7 13   CREATININE 0.7 0.8 1.6*   CALCIUM 9.1 9.5 9.5       CBC:   Recent Labs   Lab 01/09/23  0916 01/10/23  0431 01/11/23  0315   WBC 4.62 5.14 10.42   HGB 12.7* 12.8* 11.3*   HCT 39.0* 40.4 34.9*    318 401       All pertinent labs results from the past 24 hours have been reviewed.  Recent Lab Results         01/11/23 0315        Anion Gap 11       Baso # 0.08       Basophil % 0.8       BUN 13       Calcium 9.5       Chloride 106       CO2 23       Creatinine 1.6       Differential Method Automated       eGFR 53.5        Eos # 0.2       Eosinophil % 2.0       Glucose 102       Gran # (ANC) 6.6       Gran % 63.3       Hematocrit 34.9       Hemoglobin 11.3       Immature Grans (Abs) 0.06  Comment: Mild elevation in immature granulocytes is non specific and   can be seen in a variety of conditions including stress response,   acute inflammation, trauma and pregnancy. Correlation with other   laboratory and clinical findings is essential.         Immature Granulocytes 0.6       Lymph # 2.7       Lymph % 26.1       MCH 26.0       MCHC 32.4       MCV 80       Mono # 0.8       Mono % 7.2       MPV 10.1       nRBC 0       Platelets 401       Potassium 3.9       RBC 4.35       RDW 13.7       Sodium 140       WBC 10.42               Significant Imaging:  All pertinent imaging results/findings from the past 24 hours have been reviewed.
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
## 2023-01-11 NOTE — DISCHARGE INSTRUCTIONS
MENTAL HEALTH/ADDICTIVE DISORDERS  REFERRAL RECOMMENDATIONS    Suboxone    [facility] - [phone]  (can do Sublocade)  [address] [facility]  [place] [address]    [facility] (can do Sublocade)  [address] [place]  [phone]    [facility]  [address] [facility], [address], [place] [address]  [phone]; [phone]    [facility]   [address], [place]    [phone]    [facility] ([facility])  [address], [place], [place]  [phone]    [facility] ([facility])  [address] [facility] [place] [address]  [phone]    Jeffrey Silver DNP - (can do sublocade)  [phone]  [address]      Methadone Maintenance    [facility]   [place] - [address] [place] [address], [phone]  [facility] [address], [place], [place] [address] [phone]      I. AA ([phone]) [email or web address] or NA ([phone])    II. [facility] [phone], option 2    III. Other Places for Outpatient Addictive Disorders and Mental Health Treatment in [facility]:    [facility] -[phone]; [address] [facility]    [facility] ([place], [place], [place]; accepts Medicaid, commercial insurance) [phone]    [facility] ([place]) [phone], [address] [facility] [phone]; Crisis [phone] - Call for options A-E:    [facility] [place], [address] [facility] [place] [address]    [facility], [address] [place] [address]    [facility], [address] [place] [address],    [facility] [place], 2nd floor [address] [place] [address]    [facility], [address] [facility] , [place] [address]    [facility] [place], [place] [address]    [facility], [address] [place] [address], [phone]    [facility],  [place] [phone]    [facility] (Mental & General), [address] [facility], [phone]    [facility] (Mental & General Health, not only HIV+, 3 [place] locations) [phone]    [facility], [address] [place], [place], [address], [phone]     [facility], [address] [facility] [address] [place], [phone], [phone]    [facility], [address], [place], [place]   [phone]        IV. Addiction and Mental Health Treatment in Other [facility]:    [facility], [address] [place], [phone]    [facility], [phone], [address] [facility], [address], [phone]    [facility]. [address] [place] [address], [phone]    [facility], [address] [facility], [phone]    [facility], [address] [facility], [phone] ([facility])    [facility], [address] [facility], [phone] ([facility])    [facility], [address] [facility], [address], [place], [phone] ([place], [place], and [facility])    [facility], [address] [facility], [phone] ([facility])    [facility] Hotline [phone], [phone]    [facility], [address] [facility], [facility], [address] [facility], [facility], [address] [facility], [facility], [address] [facility] [address] [facility], [address] [facility]. 311, [facility], [address], [place] [phone]    [facility], [address] [facility] [phone]    [facility], [address] [facility], [phone]    [facility], [address] [facility], [phone]      V. Residential Addictive Disorders Treatment (call every day until you get in):    [facility] [address] [place], [phone]    [facility] [place]   [phone] (intake by appointment only)    [facility] (men only) [address] [facility], [phone]    [facility], [address] [facility], men's program [phone], women's program, [phone]    [facility], [address] [facility], [phone]    [facility] (Female only) [address] [facility] , [phone]    [facility] (IOP, residential, low cost, MCaid) [address] [place], [phone]    [facility] (Men only, [phone]), [address] [facility], [place]    [facility] [place] (Pregnant/women with or without children, [phone])    [facility] (Women only), [address] [place] [address], [phone]    [facility] (men only)[facility] [phone]    VI. Inpatient Rehabs (out of area)    [place], [place], [place], [phone]     [facility], [phone]    [facility], [phone]    [facility], [phone]    [place], [place] ([phone])    [place] ([facility]) [phone]    VII. Suboxone Doctors in Other regions    [place], [place]:    [facility] - [address] [place] [address] - Tel: [phone]    Kevin Eaton - [address] [place] [address] - Tel: [phone]    [facility] - [address] - [place] [address] - Tel: [phone]    Guillermo Christina - [address] [place] [address] - Tel: [phone]    Craig Gimenez - [address] [facility] [place] [address] Tel:[phone]    [place], [place]:     Dr. Dorene Yee and Dr. Carson Narayan - [address] [place], [place] - Tel: [phone]    Dr. Krista Mcgee - [address] [place] - Tel:  [phone]    Dr. Francesco Calero - Tel: [phone]    Dr. Cm Raymond - [facility] - [phone]    VIII. In case of suicidal thinking, call the COPE LINE [phone] / [phone]
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
## 2023-01-12 LAB
BASOPHILS # BLD AUTO: 0.07 K/UL (ref 0–0.2)
BASOPHILS NFR BLD: 1.1 % (ref 0–1.9)
DIFFERENTIAL METHOD: ABNORMAL
EOSINOPHIL # BLD AUTO: 0.2 K/UL (ref 0–0.5)
EOSINOPHIL NFR BLD: 2.4 % (ref 0–8)
ERYTHROCYTE [DISTWIDTH] IN BLOOD BY AUTOMATED COUNT: 14.9 % (ref 11.5–14.5)
HCT VFR BLD AUTO: 33.5 % (ref 40–54)
HGB BLD-MCNC: 10.8 G/DL (ref 14–18)
IMM GRANULOCYTES # BLD AUTO: 0.02 K/UL (ref 0–0.04)
IMM GRANULOCYTES NFR BLD AUTO: 0.3 % (ref 0–0.5)
LYMPHOCYTES # BLD AUTO: 2 K/UL (ref 1–4.8)
LYMPHOCYTES NFR BLD: 32.6 % (ref 18–48)
MCH RBC QN AUTO: 26 PG (ref 27–31)
MCHC RBC AUTO-ENTMCNC: 32.2 G/DL (ref 32–36)
MCV RBC AUTO: 81 FL (ref 82–98)
MONOCYTES # BLD AUTO: 0.4 K/UL (ref 0.3–1)
MONOCYTES NFR BLD: 6 % (ref 4–15)
NEUTROPHILS # BLD AUTO: 3.5 K/UL (ref 1.8–7.7)
NEUTROPHILS NFR BLD: 57.6 % (ref 38–73)
NRBC BLD-RTO: 0 /100 WBC
PLATELET # BLD AUTO: 306 K/UL (ref 150–450)
PMV BLD AUTO: 10.7 FL (ref 9.2–12.9)
RBC # BLD AUTO: 4.16 M/UL (ref 4.6–6.2)
WBC # BLD AUTO: 6.13 K/UL (ref 3.9–12.7)

## 2023-01-12 PROCEDURE — 36415 COLL VENOUS BLD VENIPUNCTURE: CPT | Performed by: STUDENT IN AN ORGANIZED HEALTH CARE EDUCATION/TRAINING PROGRAM

## 2023-01-12 PROCEDURE — 25000003 PHARM REV CODE 250: Performed by: STUDENT IN AN ORGANIZED HEALTH CARE EDUCATION/TRAINING PROGRAM

## 2023-01-12 PROCEDURE — 25000003 PHARM REV CODE 250: Performed by: UROLOGY

## 2023-01-12 PROCEDURE — 63600175 PHARM REV CODE 636 W HCPCS: Performed by: STUDENT IN AN ORGANIZED HEALTH CARE EDUCATION/TRAINING PROGRAM

## 2023-01-12 PROCEDURE — 20600001 HC STEP DOWN PRIVATE ROOM

## 2023-01-12 PROCEDURE — 85025 COMPLETE CBC W/AUTO DIFF WBC: CPT | Performed by: STUDENT IN AN ORGANIZED HEALTH CARE EDUCATION/TRAINING PROGRAM

## 2023-01-12 RX ORDER — SODIUM CHLORIDE, SODIUM LACTATE, POTASSIUM CHLORIDE, CALCIUM CHLORIDE 600; 310; 30; 20 MG/100ML; MG/100ML; MG/100ML; MG/100ML
INJECTION, SOLUTION INTRAVENOUS CONTINUOUS
Status: DISCONTINUED | OUTPATIENT
Start: 2023-01-12 | End: 2023-01-12 | Stop reason: SDUPTHER

## 2023-01-12 RX ORDER — CIPROFLOXACIN 500 MG/1
500 TABLET ORAL EVERY 12 HOURS
Status: DISCONTINUED | OUTPATIENT
Start: 2023-01-12 | End: 2023-01-13

## 2023-01-12 RX ORDER — SODIUM CHLORIDE, SODIUM LACTATE, POTASSIUM CHLORIDE, CALCIUM CHLORIDE 600; 310; 30; 20 MG/100ML; MG/100ML; MG/100ML; MG/100ML
INJECTION, SOLUTION INTRAVENOUS CONTINUOUS
Status: DISCONTINUED | OUTPATIENT
Start: 2023-01-12 | End: 2023-01-13

## 2023-01-12 RX ADMIN — SODIUM CHLORIDE, POTASSIUM CHLORIDE, SODIUM LACTATE AND CALCIUM CHLORIDE: 600; 310; 30; 20 INJECTION, SOLUTION INTRAVENOUS at 02:01

## 2023-01-12 RX ADMIN — OXYCODONE HYDROCHLORIDE 15 MG: 10 TABLET ORAL at 02:01

## 2023-01-12 RX ADMIN — CIPROFLOXACIN HYDROCHLORIDE 500 MG: 500 TABLET, FILM COATED ORAL at 09:01

## 2023-01-12 RX ADMIN — ACETAMINOPHEN 1000 MG: 500 TABLET ORAL at 11:01

## 2023-01-12 RX ADMIN — ACETAMINOPHEN 1000 MG: 500 TABLET ORAL at 05:01

## 2023-01-12 RX ADMIN — OXYCODONE HYDROCHLORIDE 15 MG: 10 TABLET ORAL at 08:01

## 2023-01-12 RX ADMIN — CIPROFLOXACIN HYDROCHLORIDE 750 MG: 500 TABLET, FILM COATED ORAL at 08:01

## 2023-01-12 RX ADMIN — OXYCODONE HYDROCHLORIDE 15 MG: 10 TABLET ORAL at 03:01

## 2023-01-12 RX ADMIN — HYDROMORPHONE HYDROCHLORIDE 0.5 MG: 1 INJECTION, SOLUTION INTRAMUSCULAR; INTRAVENOUS; SUBCUTANEOUS at 11:01

## 2023-01-12 RX ADMIN — ACETAMINOPHEN 1000 MG: 500 TABLET ORAL at 06:01

## 2023-01-12 NOTE — ASSESSMENT & PLAN NOTE
Endy Bruno is a 48 yo M with a history IVDU presenting for scrotal pain and swelling. Urology was consulted for scrotal u/s w/o blood flow on the right side.    - s/p scrotal exploration, right orchiectomy, scrotal washout  - wound care consulted for wound packing, appreciate assistance  - Will consider wound vac  - ID consulted, appreciate recs  - Po Cipro BID  - Culture growing Klebsiella  - psychiatry consulted for withdrawal, appreciate recs  - f/u BMP  - Appreciate social work's assistance with discharge, stable for discharge at this time, will continue to work on placement

## 2023-01-12 NOTE — NURSING
Wound care follow up for scrotal dressing change, urology MD at bedside to assess.      Pt found in bed, agreeable to care at this time. Dressing change completed by pt today w/ verbal instruction. Pt utilized NS flushes to remove old packing. Vashe moistened gauze packed into incision, abd pad for coverage, secured w/ mesh briefs.     Recommendations -   Continue w/ wound care education w/ pt  Daily dressing changes w/ urology MD, wound care.

## 2023-01-12 NOTE — SUBJECTIVE & OBJECTIVE
Interval History: NAEO. AFVSS. Pain controlled. Denies complaints at this time.      Objective:     Temp:  [98 °F (36.7 °C)-98.7 °F (37.1 °C)] 98 °F (36.7 °C)  Pulse:  [75-97] 84  Resp:  [16-20] 16  SpO2:  [96 %-99 %] 99 %  BP: ()/(50-67) 101/53     Body mass index is 24.03 kg/m².           Drains       None                   Physical Exam  Vitals reviewed.   Constitutional:       Appearance: He is well-developed. He is not diaphoretic.   HENT:      Head: Normocephalic and atraumatic.   Eyes:      General: No scleral icterus.  Cardiovascular:      Rate and Rhythm: Normal rate.   Pulmonary:      Effort: Pulmonary effort is normal. No respiratory distress.      Breath sounds: No stridor.   Abdominal:      General: There is no distension.      Palpations: Abdomen is soft.      Tenderness: There is no abdominal tenderness.   Genitourinary:     Comments: Scrotal wound open, packed with betadine soaked kerlix  Musculoskeletal:         General: Normal range of motion.      Cervical back: Normal range of motion.   Skin:     General: Skin is warm and dry.   Neurological:      Mental Status: He is alert.       Significant Labs:    BMP:  Recent Labs   Lab 01/09/23  0916 01/10/23  0431 01/11/23 0315   * 134* 140   K 3.9 4.1 3.9    102 106   CO2 21* 19* 23   BUN 6 7 13   CREATININE 0.7 0.8 1.6*   CALCIUM 9.1 9.5 9.5       CBC:   Recent Labs   Lab 01/09/23  0916 01/10/23  0431 01/11/23  0315   WBC 4.62 5.14 10.42   HGB 12.7* 12.8* 11.3*   HCT 39.0* 40.4 34.9*    318 401       Urine Studies: No results for input(s): COLORU, APPEARANCEUA, PHUR, SPECGRAV, PROTEINUA, GLUCUA, KETONESU, BILIRUBINUA, OCCULTUA, NITRITE, UROBILINOGEN, LEUKOCYTESUR, RBCUA, WBCUA, BACTERIA, SQUAMEPITHEL, HYALINECASTS in the last 168 hours.    Invalid input(s): WRIGHTSUR    Significant Imaging:  All pertinent imaging results/findings from the past 24 hours have been reviewed.
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
## 2023-01-12 NOTE — PLAN OF CARE
[facility]  Discharge Reassessment      Expected Discharge Date: 1/12/2023    Waiting on Medicaid to approve or deny the SCA from [facility]     Reassessment (most recent)       Discharge Reassessment - 01/12/23 1033          Discharge Reassessment    Assessment Type Discharge Planning Reassessment     Discharge Plan A Long-term acute care facility (LTAC)     Discharge Plan B Shelter     DME Needed Upon Discharge  none     Discharge Barriers Identified Substance Abuse;Transportation;Homeless     Why the patient remains in the hospital Placement issues;Insurance issues        Post-Acute Status    Post-Acute Authorization Placement     Post-Acute Placement Status Pending payor review/awaiting authorization (if required)                     Vannessa Carson RN, CM   Ext: [phone]

## 2023-01-12 NOTE — PLAN OF CARE
Midline placed in Right arm today. Dressing to be changed today with MD/Wound care. Pt is allowed to walk outside to smoke, MD is aware. Patient currently has PRN oral/IV medications and states they only slightly reduce pain. Pt needs to shower but to date has declined. Ambulates w/o issue. Pt has continued to decline labs this morning on multiple occassions. MD also aware of this.

## 2023-01-12 NOTE — NURSING
Pt refused to allow lab to be drawb this am as patient wanted to wait for his IV pain meds to be available and accessible. Unfortunately patient wanted to wait until his IV pain meds were due to be given before labs were drawn after receiving his pain meds, Message relayed to day nurse, and the  states someone will follow up to draw labs during breakfast time.

## 2023-01-12 NOTE — PLAN OF CARE
POC is reviewed and understood by patient. Independent to toilet. Pt has a midline consult for to place a new IV line as the other one began to leak and pt has poor venous access. Pt currently uses oral pain meds for pain control. No sign of distress noted. HOB elevated. Bed in lowest position. WCTM.   Problem: Adult Inpatient Plan of Care  Goal: Plan of Care Review  Outcome: Ongoing, Progressing  Goal: Patient-Specific Goal (Individualized)  Outcome: Ongoing, Progressing  Goal: Absence of Hospital-Acquired Illness or Injury  Outcome: Ongoing, Progressing  Goal: Optimal Comfort and Wellbeing  Outcome: Ongoing, Progressing  Goal: Readiness for Transition of Care  Outcome: Ongoing, Progressing     Problem: Infection  Goal: Absence of Infection Signs and Symptoms  Outcome: Ongoing, Progressing     Problem: Fall Injury Risk  Goal: Absence of Fall and Fall-Related Injury  Outcome: Ongoing, Progressing

## 2023-01-13 VITALS
HEIGHT: 64 IN | BODY MASS INDEX: 18.85 KG/M2 | SYSTOLIC BLOOD PRESSURE: 100 MMHG | OXYGEN SATURATION: 99 % | DIASTOLIC BLOOD PRESSURE: 59 MMHG | RESPIRATION RATE: 14 BRPM | WEIGHT: 110.44 LBS | HEART RATE: 69 BPM | TEMPERATURE: 98 F

## 2023-01-13 LAB
ANION GAP SERPL CALC-SCNC: 8 MMOL/L (ref 8–16)
BACTERIA BLD CULT: NORMAL
BACTERIA BLD CULT: NORMAL
BUN SERPL-MCNC: 14 MG/DL (ref 6–20)
CALCIUM SERPL-MCNC: 9.1 MG/DL (ref 8.7–10.5)
CHLORIDE SERPL-SCNC: 106 MMOL/L (ref 95–110)
CO2 SERPL-SCNC: 24 MMOL/L (ref 23–29)
CREAT SERPL-MCNC: 0.7 MG/DL (ref 0.5–1.4)
EST. GFR  (NO RACE VARIABLE): >60 ML/MIN/1.73 M^2
GLUCOSE SERPL-MCNC: 104 MG/DL (ref 70–110)
POTASSIUM SERPL-SCNC: 3.9 MMOL/L (ref 3.5–5.1)
SODIUM SERPL-SCNC: 138 MMOL/L (ref 136–145)

## 2023-01-13 PROCEDURE — 76937 US GUIDE VASCULAR ACCESS: CPT

## 2023-01-13 PROCEDURE — 36415 COLL VENOUS BLD VENIPUNCTURE: CPT | Performed by: STUDENT IN AN ORGANIZED HEALTH CARE EDUCATION/TRAINING PROGRAM

## 2023-01-13 PROCEDURE — C1751 CATH, INF, PER/CENT/MIDLINE: HCPCS

## 2023-01-13 PROCEDURE — 36410 VNPNXR 3YR/> PHY/QHP DX/THER: CPT

## 2023-01-13 PROCEDURE — 25000003 PHARM REV CODE 250: Performed by: UROLOGY

## 2023-01-13 PROCEDURE — 25000003 PHARM REV CODE 250: Performed by: STUDENT IN AN ORGANIZED HEALTH CARE EDUCATION/TRAINING PROGRAM

## 2023-01-13 PROCEDURE — 63600175 PHARM REV CODE 636 W HCPCS: Performed by: STUDENT IN AN ORGANIZED HEALTH CARE EDUCATION/TRAINING PROGRAM

## 2023-01-13 PROCEDURE — 80048 BASIC METABOLIC PNL TOTAL CA: CPT | Performed by: STUDENT IN AN ORGANIZED HEALTH CARE EDUCATION/TRAINING PROGRAM

## 2023-01-13 RX ORDER — ACETAMINOPHEN 325 MG/1
650 TABLET ORAL EVERY 6 HOURS PRN
Status: CANCELLED | OUTPATIENT
Start: 2023-01-13

## 2023-01-13 RX ORDER — CALCIUM CARBONATE 200(500)MG
500 TABLET,CHEWABLE ORAL 2 TIMES DAILY PRN
Status: CANCELLED | OUTPATIENT
Start: 2023-01-13

## 2023-01-13 RX ORDER — ONDANSETRON 8 MG/1
8 TABLET, ORALLY DISINTEGRATING ORAL EVERY 8 HOURS PRN
Status: CANCELLED | OUTPATIENT
Start: 2023-01-13

## 2023-01-13 RX ORDER — DICYCLOMINE HYDROCHLORIDE 10 MG/1
10 CAPSULE ORAL 4 TIMES DAILY PRN
Status: CANCELLED | OUTPATIENT
Start: 2023-01-13

## 2023-01-13 RX ORDER — NAPROXEN 250 MG/1
250 TABLET ORAL 2 TIMES DAILY WITH MEALS
Status: CANCELLED | OUTPATIENT
Start: 2023-01-13

## 2023-01-13 RX ORDER — CIPROFLOXACIN 500 MG/1
500 TABLET ORAL EVERY 12 HOURS
Qty: 16 TABLET | Refills: 0 | Status: SHIPPED | OUTPATIENT
Start: 2023-01-13 | End: 2023-01-13

## 2023-01-13 RX ORDER — IBUPROFEN 200 MG
1 TABLET ORAL DAILY
Status: CANCELLED | OUTPATIENT
Start: 2023-01-13

## 2023-01-13 RX ORDER — OXYCODONE HYDROCHLORIDE 5 MG/1
10 TABLET ORAL EVERY 4 HOURS PRN
Qty: 40 TABLET | Refills: 0 | Status: SHIPPED | OUTPATIENT
Start: 2023-01-13 | End: 2023-01-13

## 2023-01-13 RX ORDER — HYDROXYZINE PAMOATE 50 MG/1
50 CAPSULE ORAL EVERY 8 HOURS PRN
Status: CANCELLED | OUTPATIENT
Start: 2023-01-13

## 2023-01-13 RX ORDER — NALOXONE HCL 0.4 MG/ML
0.4 VIAL (ML) INJECTION
Status: CANCELLED | OUTPATIENT
Start: 2023-01-13

## 2023-01-13 RX ORDER — METHOCARBAMOL 500 MG/1
500 TABLET, FILM COATED ORAL 4 TIMES DAILY PRN
Status: CANCELLED | OUTPATIENT
Start: 2023-01-13

## 2023-01-13 RX ORDER — OXYCODONE HYDROCHLORIDE 10 MG/1
10 TABLET ORAL EVERY 4 HOURS PRN
Status: CANCELLED | OUTPATIENT
Start: 2023-01-13

## 2023-01-13 RX ORDER — TALC
6 POWDER (GRAM) TOPICAL NIGHTLY PRN
Status: CANCELLED | OUTPATIENT
Start: 2023-01-13

## 2023-01-13 RX ORDER — AMOXICILLIN 250 MG
1 CAPSULE ORAL 2 TIMES DAILY
Status: CANCELLED | OUTPATIENT
Start: 2023-01-13

## 2023-01-13 RX ORDER — ACETAMINOPHEN 500 MG
1000 TABLET ORAL EVERY 6 HOURS
Status: CANCELLED | OUTPATIENT
Start: 2023-01-13

## 2023-01-13 RX ORDER — POLYETHYLENE GLYCOL 3350 17 G/17G
17 POWDER, FOR SOLUTION ORAL DAILY
Status: CANCELLED | OUTPATIENT
Start: 2023-01-14

## 2023-01-13 RX ORDER — CIPROFLOXACIN 500 MG/1
500 TABLET ORAL EVERY 12 HOURS
Status: CANCELLED | OUTPATIENT
Start: 2023-01-13 | End: 2023-01-22

## 2023-01-13 RX ORDER — CIPROFLOXACIN 2 MG/ML
400 INJECTION, SOLUTION INTRAVENOUS
Status: CANCELLED | OUTPATIENT
Start: 2023-01-13

## 2023-01-13 RX ADMIN — HYDROMORPHONE HYDROCHLORIDE 0.5 MG: 1 INJECTION, SOLUTION INTRAMUSCULAR; INTRAVENOUS; SUBCUTANEOUS at 05:01

## 2023-01-13 RX ADMIN — OXYCODONE HYDROCHLORIDE 15 MG: 10 TABLET ORAL at 10:01

## 2023-01-13 RX ADMIN — CIPROFLOXACIN HYDROCHLORIDE 500 MG: 500 TABLET, FILM COATED ORAL at 10:01

## 2023-01-13 NOTE — ASSESSMENT & PLAN NOTE
Endy Bruno is a 46 yo M with a history IVDU presenting for scrotal pain and swelling. Urology was consulted for scrotal u/s w/o blood flow on the right side.    - s/p scrotal exploration, right orchiectomy, scrotal washout  - MARSHAL resolved, Cr back to baseline 0.7 (1.6) this AM  - wound care consulted for wound packing, appreciate assistance  - ID consulted, appreciate recs  - will discharge on PO Cipro  - Culture growing Klebsiella  - psychiatry consulted for withdrawal, appreciate recs  - Appreciate social work's assistance with discharge, stable for discharge at this time, will continue to work on placement

## 2023-01-13 NOTE — SUBJECTIVE & OBJECTIVE
Interval History: NAEO.  AFVSS.  Cr 0.7 this AM from 1.6.  Scrotal abscess cx from 1/9 growing Klebsiella.  Stable for discharge on PO abx, awaiting placement.      Review of Systems: Per HPI   Objective:     Temp:  [97.3 °F (36.3 °C)-98.5 °F (36.9 °C)] 97.8 °F (36.6 °C)  Pulse:  [] 69  Resp:  [12-20] 18  SpO2:  [93 %-100 %] 99 %  BP: (100-134)/(59-73) 100/59     Body mass index is 18.96 kg/m².           Drains       None                   Physical Exam  Vitals reviewed.   Constitutional:       Appearance: He is well-developed. He is not diaphoretic.   HENT:      Head: Normocephalic and atraumatic.   Eyes:      General: No scleral icterus.  Cardiovascular:      Rate and Rhythm: Normal rate.   Pulmonary:      Effort: Pulmonary effort is normal. No respiratory distress.      Breath sounds: No stridor.   Abdominal:      General: There is no distension.      Palpations: Abdomen is soft.      Tenderness: There is no abdominal tenderness.   Genitourinary:     Comments: Scrotal wound open, packed with betadine soaked kerlix  Musculoskeletal:         General: Normal range of motion.      Cervical back: Normal range of motion.   Skin:     General: Skin is warm and dry.   Neurological:      Mental Status: He is alert.       Significant Labs:    BMP:  Recent Labs   Lab 01/10/23  0431 01/11/23  0315 01/13/23  0602   * 140 138   K 4.1 3.9 3.9    106 106   CO2 19* 23 24   BUN 7 13 14   CREATININE 0.8 1.6* 0.7   CALCIUM 9.5 9.5 9.1       CBC:   Recent Labs   Lab 01/10/23  0431 01/11/23  0315 01/12/23  1133   WBC 5.14 10.42 6.13   HGB 12.8* 11.3* 10.8*   HCT 40.4 34.9* 33.5*    401 306       All pertinent labs results from the past 24 hours have been reviewed.  Recent Lab Results         01/13/23  0602   01/12/23  1133        Anion Gap 8         Baso #   0.07       Basophil %   1.1       BUN 14         Calcium 9.1         Chloride 106         CO2 24         Creatinine 0.7         Differential Method    Automated       eGFR >60.0         Eos #   0.2       Eosinophil %   2.4       Glucose 104         Gran # (ANC)   3.5       Gran %   57.6       Hematocrit   33.5       Hemoglobin   10.8       Immature Grans (Abs)   0.02  Comment: Mild elevation in immature granulocytes is non specific and   can be seen in a variety of conditions including stress response,   acute inflammation, trauma and pregnancy. Correlation with other   laboratory and clinical findings is essential.         Immature Granulocytes   0.3       Lymph #   2.0       Lymph %   32.6       MCH   26.0       MCHC   32.2       MCV   81       Mono #   0.4       Mono %   6.0       MPV   10.7       nRBC   0       Platelets   306       Potassium 3.9         RBC   4.16       RDW   14.9       Sodium 138         WBC   6.13               Significant Imaging:  All pertinent imaging results/findings from the past 24 hours have been reviewed.

## 2023-01-13 NOTE — CONSULTS
Consulted for transfer to .  Challenging discharge, initially going to [facility] (single payor agreement, as he was denied from other facilities; at the time of the single payor agreement he was on IV ABx plus the wound care needs; pt doing wound care but needs assist from RN).  Then plan changed due to some variables.  Discussed with Dr Ro (Urology), and escalated to [facility] leadership.  Ultimately pt ok to go to LTAC today.

## 2023-01-13 NOTE — CONSULTS
DANNY placed 18g, 8 cm Midline in left brachial vein using u/s guidance.  Max dwell date 2/11/2023.  Lot # [id number].    MIDLINE inserted for IV abx: ciprofloxacin est end date 1/21/2023

## 2023-01-13 NOTE — ASSESSMENT & PLAN NOTE
Endy Bruno is a 48 yo M with a history IVDU presenting for scrotal pain and swelling. Urology was consulted for scrotal u/s w/o blood flow on the right side.    - s/p scrotal exploration, right orchiectomy, scrotal washout  - MARSHAL resolved, Cr back to baseline 0.7 (1.6) this AM  - wound care consulted for wound packing, appreciate assistance  - ID consulted, appreciate recs  - will discharge on IV Cipro  - Culture growing Klebsiella  - psychiatry consulted for withdrawal, appreciate recs  - Appreciate social work's assistance with discharge, stable for discharge at this time, will continue to work on placement

## 2023-01-13 NOTE — PLAN OF CARE
LACHO contacted Jose De Jesus with [facility] to reschedule transportation for an ambulatory van or [facility] wheelchair van to [facility] this evening.   Patient is ready for discharge.

## 2023-01-13 NOTE — PROGRESS NOTES
Pharmacist Renal Dose Adjustment Note    Endy Bruno is a 47 y.o. male being treated with the medication ciprofloxacin    Patient Data:    Vital Signs (Most Recent):  Temp: 97.8 °F (36.6 °C) (01/13/23 0004)  Pulse: 69 (01/13/23 0004)  Resp: 14 (01/13/23 1012)  BP: (!) 100/59 (01/13/23 0004)  SpO2: 99 % (01/13/23 0004)   Vital Signs (72h Range):  Temp:  [97.3 °F (36.3 °C)-98.7 °F (37.1 °C)]   Pulse:  []   Resp:  [12-20]   BP: ()/(50-75)   SpO2:  [93 %-100 %]      Recent Labs   Lab 01/10/23  0431 01/11/23  0315 01/13/23  0602   CREATININE 0.8 1.6* 0.7     Serum creatinine: 0.7 mg/dL 01/13/23 0602  Estimated creatinine clearance: 93.4 mL/min    Ciprofloxacin 500 mg PO q12h will be changed to 750 mg PO q12h given MARSHAL has resolved    Pharmacist's Name: Tang Whitt, PharmD, BCPS   Pharmacist's Extension: [phone]
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
## 2023-01-13 NOTE — PLAN OF CARE
Report given to Hali at [facility]. Pt to dc to room 235. No IV access. All belongings to be sent with patient. All questions answered. No IV access. Transport requested

## 2023-01-13 NOTE — PLAN OF CARE
[facility]  Discharge Final Note      Expected Discharge Date: 1/13/2023    Patient will be discharged to [facility]  Transport arranged through the [facility] for 1430     Final Discharge Note (most recent)       Final Note - 01/13/23 1331          Final Note    Assessment Type Final Discharge Note     Anticipated Discharge Disposition Long Term Acute Care        Post-Acute Status    Post-Acute Authorization Placement     Post-Acute Placement Status Set-up Complete/Auth obtained                     Future Appointments   Date Time Provider Department Center   1/25/2023  1:30 PM Ashish Higgins Jr., PA [facility] ID [facility] RN, CM   Ext: [phone]
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
## 2023-01-13 NOTE — PROGRESS NOTES
Wound care follow up for scrotal dressing change, urology MD at bedside to assess.      Pt found in bed, agreeable to care at this time. Dressing change completed by pt today w/ minimal instruction. Pt freshly showered and removed scrotal packing independently. Vashe moistened gauze packed into incision by pt, abd pad for coverage, secured w/ mesh briefs. Pt endorses confidence in self dressing changes.     Recommendations -   Continue w/ wound care education w/ pt  Daily dressing changes w/ urology MD, wound care.         01/13/23 1000   WOCN Assessment   WOCN Total Time (mins) 20   Visit Date 01/13/23   Visit Time 1000   Consult Type Follow Up   WOCN Speciality Wound   Wound surgical   Intervention assessed;changed;applied;chart review;coordination of care;team conference   Teaching on-going;discharge        Incision/Site 01/07/23 1020 Scrotum   Date First Assessed/Time First Assessed: 01/07/23 1020   Location: Scrotum   Wound Image    Incision WDL WDL   Dressing Appearance Moist drainage   Drainage Amount Scant   Drainage Characteristics/Odor Serosanguineous   Appearance Pink;Red   Red (%), Wound Tissue Color 100 %   Periwound Area Intact;Edematous   Wound Length (cm) 4.2 cm   Wound Width (cm) 3.1 cm   Wound Depth (cm) 2.8 cm   Wound Volume (cm^3) 36.456 cm^3   Wound Surface Area (cm^2) 13.02 cm^2   Care Cleansed with:;Wound cleanser   Dressing Removed;Changed;Rolled gauze;Other (comment);Cast padding  (vashe)

## 2023-01-13 NOTE — PLAN OF CARE
POC is reviewed and understood by patient. Independent to toilet. Pt ambulates the hallway. Pt had no complaints of pain throughout the shift. No sign of distress noted. HOB elevated. Bed in lowest position. WCTM.   Problem: Adult Inpatient Plan of Care  Goal: Plan of Care Review  Outcome: Ongoing, Progressing  Goal: Patient-Specific Goal (Individualized)  Outcome: Ongoing, Progressing  Goal: Absence of Hospital-Acquired Illness or Injury  Outcome: Ongoing, Progressing  Goal: Optimal Comfort and Wellbeing  Outcome: Ongoing, Progressing  Goal: Readiness for Transition of Care  Outcome: Ongoing, Progressing     Problem: Infection  Goal: Absence of Infection Signs and Symptoms  Outcome: Ongoing, Progressing     Problem: Fall Injury Risk  Goal: Absence of Fall and Fall-Related Injury  Outcome: Ongoing, Progressing     Problem: Skin Injury Risk Increased  Goal: Skin Health and Integrity  Outcome: Ongoing, Progressing

## 2023-01-14 PROBLEM — D63.8 ANEMIA OF INFECTION AND CHRONIC DISEASE: Status: ACTIVE | Noted: 2023-01-14

## 2023-01-14 PROBLEM — F17.210 DEPENDENCE ON NICOTINE FROM CIGARETTES: Status: ACTIVE | Noted: 2023-01-14

## 2023-01-14 PROBLEM — B99.9 ANEMIA OF INFECTION AND CHRONIC DISEASE: Status: ACTIVE | Noted: 2023-01-14

## 2023-01-14 PROBLEM — A49.8 KLEBSIELLA PNEUMONIAE INFECTION: Status: ACTIVE | Noted: 2023-01-14

## 2023-01-14 PROBLEM — T14.8XXA SURGICAL WOUND PRESENT: Status: ACTIVE | Noted: 2023-01-14

## 2023-01-14 PROBLEM — R76.8 HEPATITIS C ANTIBODY TEST POSITIVE: Status: ACTIVE | Noted: 2023-01-14

## 2023-01-14 PROBLEM — F11.20 OPIOID USE DISORDER, SEVERE, DEPENDENCE: Status: ACTIVE | Noted: 2023-01-14

## 2023-01-17 LAB
FINAL PATHOLOGIC DIAGNOSIS: NORMAL
Lab: NORMAL

## 2023-01-19 PROCEDURE — 99285 EMERGENCY DEPT VISIT HI MDM: CPT | Mod: ,,, | Performed by: EMERGENCY MEDICINE

## 2023-01-19 PROCEDURE — 99285 PR EMERGENCY DEPT VISIT,LEVEL V: ICD-10-PCS | Mod: ,,, | Performed by: EMERGENCY MEDICINE

## 2023-01-20 ENCOUNTER — HOSPITAL ENCOUNTER (INPATIENT)
Facility: HOSPITAL | Age: 48
LOS: 8 days | Discharge: HOME | DRG: 863 | End: 2023-01-28
Attending: EMERGENCY MEDICINE | Admitting: STUDENT IN AN ORGANIZED HEALTH CARE EDUCATION/TRAINING PROGRAM
Payer: MEDICAID

## 2023-01-20 DIAGNOSIS — F12.20 CANNABIS USE DISORDER, MODERATE, DEPENDENCE: Chronic | ICD-10-CM

## 2023-01-20 DIAGNOSIS — F11.20 OPIOID USE DISORDER, SEVERE, DEPENDENCE: ICD-10-CM

## 2023-01-20 DIAGNOSIS — M79.89 LEG SWELLING: ICD-10-CM

## 2023-01-20 DIAGNOSIS — T81.40XA POST OP INFECTION: ICD-10-CM

## 2023-01-20 DIAGNOSIS — F17.200 TOBACCO USE DISORDER: ICD-10-CM

## 2023-01-20 DIAGNOSIS — T14.8XXA SURGICAL WOUND PRESENT: ICD-10-CM

## 2023-01-20 DIAGNOSIS — N45.4 TESTICULAR ABSCESS: ICD-10-CM

## 2023-01-20 DIAGNOSIS — F19.90 IVDU (INTRAVENOUS DRUG USER): ICD-10-CM

## 2023-01-20 DIAGNOSIS — D63.8 ANEMIA OF INFECTION AND CHRONIC DISEASE: ICD-10-CM

## 2023-01-20 DIAGNOSIS — T81.49XA POST-OPERATIVE WOUND ABSCESS: ICD-10-CM

## 2023-01-20 DIAGNOSIS — B99.9 ANEMIA OF INFECTION AND CHRONIC DISEASE: ICD-10-CM

## 2023-01-20 DIAGNOSIS — L08.9 WOUND INFECTION: ICD-10-CM

## 2023-01-20 DIAGNOSIS — T14.8XXA WOUND INFECTION: ICD-10-CM

## 2023-01-20 DIAGNOSIS — R07.9 CHEST PAIN: ICD-10-CM

## 2023-01-20 DIAGNOSIS — R65.10 SIRS (SYSTEMIC INFLAMMATORY RESPONSE SYNDROME): Primary | ICD-10-CM

## 2023-01-20 DIAGNOSIS — F17.210 CIGARETTE NICOTINE DEPENDENCE WITHOUT COMPLICATION: ICD-10-CM

## 2023-01-20 LAB
ALBUMIN SERPL BCP-MCNC: 3.3 G/DL (ref 3.5–5.2)
ALP SERPL-CCNC: 101 U/L (ref 55–135)
ALT SERPL W/O P-5'-P-CCNC: 11 U/L (ref 10–44)
ANION GAP SERPL CALC-SCNC: 10 MMOL/L (ref 8–16)
AST SERPL-CCNC: 13 U/L (ref 10–40)
BASOPHILS # BLD AUTO: 0.04 K/UL (ref 0–0.2)
BASOPHILS NFR BLD: 0.6 % (ref 0–1.9)
BILIRUB SERPL-MCNC: 0.3 MG/DL (ref 0.1–1)
BILIRUB UR QL STRIP: NEGATIVE
BUN SERPL-MCNC: 13 MG/DL (ref 6–20)
CALCIUM SERPL-MCNC: 9.4 MG/DL (ref 8.7–10.5)
CHLORIDE SERPL-SCNC: 103 MMOL/L (ref 95–110)
CLARITY UR REFRACT.AUTO: CLEAR
CO2 SERPL-SCNC: 25 MMOL/L (ref 23–29)
COLOR UR AUTO: YELLOW
CREAT SERPL-MCNC: 0.9 MG/DL (ref 0.5–1.4)
DIFFERENTIAL METHOD: ABNORMAL
EOSINOPHIL # BLD AUTO: 0.3 K/UL (ref 0–0.5)
EOSINOPHIL NFR BLD: 3.9 % (ref 0–8)
ERYTHROCYTE [DISTWIDTH] IN BLOOD BY AUTOMATED COUNT: 15.5 % (ref 11.5–14.5)
EST. GFR  (NO RACE VARIABLE): >60 ML/MIN/1.73 M^2
GLUCOSE SERPL-MCNC: 82 MG/DL (ref 70–110)
GLUCOSE UR QL STRIP: NEGATIVE
HCT VFR BLD AUTO: 34 % (ref 40–54)
HGB BLD-MCNC: 10.7 G/DL (ref 14–18)
HGB UR QL STRIP: NEGATIVE
IMM GRANULOCYTES # BLD AUTO: 0.03 K/UL (ref 0–0.04)
IMM GRANULOCYTES NFR BLD AUTO: 0.4 % (ref 0–0.5)
KETONES UR QL STRIP: NEGATIVE
LEUKOCYTE ESTERASE UR QL STRIP: NEGATIVE
LYMPHOCYTES # BLD AUTO: 2.4 K/UL (ref 1–4.8)
LYMPHOCYTES NFR BLD: 33.8 % (ref 18–48)
MCH RBC QN AUTO: 26.2 PG (ref 27–31)
MCHC RBC AUTO-ENTMCNC: 31.5 G/DL (ref 32–36)
MCV RBC AUTO: 83 FL (ref 82–98)
MONOCYTES # BLD AUTO: 0.5 K/UL (ref 0.3–1)
MONOCYTES NFR BLD: 6.7 % (ref 4–15)
NEUTROPHILS # BLD AUTO: 3.8 K/UL (ref 1.8–7.7)
NEUTROPHILS NFR BLD: 54.6 % (ref 38–73)
NITRITE UR QL STRIP: NEGATIVE
NRBC BLD-RTO: 0 /100 WBC
PH UR STRIP: 7 [PH] (ref 5–8)
PLATELET # BLD AUTO: 270 K/UL (ref 150–450)
PMV BLD AUTO: 10.6 FL (ref 9.2–12.9)
POTASSIUM SERPL-SCNC: 4.1 MMOL/L (ref 3.5–5.1)
PROT SERPL-MCNC: 7 G/DL (ref 6–8.4)
PROT UR QL STRIP: NEGATIVE
RBC # BLD AUTO: 4.08 M/UL (ref 4.6–6.2)
SODIUM SERPL-SCNC: 138 MMOL/L (ref 136–145)
SP GR UR STRIP: 1.01 (ref 1–1.03)
URN SPEC COLLECT METH UR: NORMAL
WBC # BLD AUTO: 6.98 K/UL (ref 3.9–12.7)

## 2023-01-20 PROCEDURE — S4991 NICOTINE PATCH NONLEGEND: HCPCS

## 2023-01-20 PROCEDURE — 87075 CULTR BACTERIA EXCEPT BLOOD: CPT

## 2023-01-20 PROCEDURE — 76937 US GUIDE VASCULAR ACCESS: CPT

## 2023-01-20 PROCEDURE — 63600175 PHARM REV CODE 636 W HCPCS: Performed by: STUDENT IN AN ORGANIZED HEALTH CARE EDUCATION/TRAINING PROGRAM

## 2023-01-20 PROCEDURE — 99223 PR INITIAL HOSPITAL CARE,LEVL III: ICD-10-PCS | Mod: ,,,

## 2023-01-20 PROCEDURE — 96361 HYDRATE IV INFUSION ADD-ON: CPT

## 2023-01-20 PROCEDURE — 12000002 HC ACUTE/MED SURGE SEMI-PRIVATE ROOM

## 2023-01-20 PROCEDURE — 96365 THER/PROPH/DIAG IV INF INIT: CPT

## 2023-01-20 PROCEDURE — G0378 HOSPITAL OBSERVATION PER HR: HCPCS

## 2023-01-20 PROCEDURE — 99223 1ST HOSP IP/OBS HIGH 75: CPT | Mod: ,,,

## 2023-01-20 PROCEDURE — 25000003 PHARM REV CODE 250

## 2023-01-20 PROCEDURE — 99285 EMERGENCY DEPT VISIT HI MDM: CPT | Mod: 25

## 2023-01-20 PROCEDURE — 63600175 PHARM REV CODE 636 W HCPCS

## 2023-01-20 PROCEDURE — 87070 CULTURE OTHR SPECIMN AEROBIC: CPT

## 2023-01-20 PROCEDURE — 81003 URINALYSIS AUTO W/O SCOPE: CPT | Performed by: STUDENT IN AN ORGANIZED HEALTH CARE EDUCATION/TRAINING PROGRAM

## 2023-01-20 PROCEDURE — 80053 COMPREHEN METABOLIC PANEL: CPT | Performed by: STUDENT IN AN ORGANIZED HEALTH CARE EDUCATION/TRAINING PROGRAM

## 2023-01-20 PROCEDURE — C1751 CATH, INF, PER/CENT/MIDLINE: HCPCS

## 2023-01-20 PROCEDURE — 96367 TX/PROPH/DG ADDL SEQ IV INF: CPT

## 2023-01-20 PROCEDURE — 36410 VNPNXR 3YR/> PHY/QHP DX/THER: CPT

## 2023-01-20 PROCEDURE — 85025 COMPLETE CBC W/AUTO DIFF WBC: CPT | Performed by: STUDENT IN AN ORGANIZED HEALTH CARE EDUCATION/TRAINING PROGRAM

## 2023-01-20 PROCEDURE — 96375 TX/PRO/DX INJ NEW DRUG ADDON: CPT

## 2023-01-20 RX ORDER — PROCHLORPERAZINE EDISYLATE 5 MG/ML
5 INJECTION INTRAMUSCULAR; INTRAVENOUS EVERY 6 HOURS PRN
Status: DISCONTINUED | OUTPATIENT
Start: 2023-01-20 | End: 2023-01-28 | Stop reason: HOSPADM

## 2023-01-20 RX ORDER — ACETAMINOPHEN 325 MG/1
650 TABLET ORAL EVERY 4 HOURS PRN
Status: DISCONTINUED | OUTPATIENT
Start: 2023-01-20 | End: 2023-01-20

## 2023-01-20 RX ORDER — KETOROLAC TROMETHAMINE 30 MG/ML
15 INJECTION, SOLUTION INTRAMUSCULAR; INTRAVENOUS EVERY 6 HOURS PRN
Status: DISPENSED | OUTPATIENT
Start: 2023-01-20 | End: 2023-01-23

## 2023-01-20 RX ORDER — IPRATROPIUM BROMIDE AND ALBUTEROL SULFATE 2.5; .5 MG/3ML; MG/3ML
3 SOLUTION RESPIRATORY (INHALATION) EVERY 4 HOURS PRN
Status: DISCONTINUED | OUTPATIENT
Start: 2023-01-20 | End: 2023-01-28 | Stop reason: HOSPADM

## 2023-01-20 RX ORDER — CIPROFLOXACIN 2 MG/ML
400 INJECTION, SOLUTION INTRAVENOUS
Status: DISCONTINUED | OUTPATIENT
Start: 2023-01-20 | End: 2023-01-21

## 2023-01-20 RX ORDER — KETOROLAC TROMETHAMINE 30 MG/ML
10 INJECTION, SOLUTION INTRAMUSCULAR; INTRAVENOUS
Status: COMPLETED | OUTPATIENT
Start: 2023-01-20 | End: 2023-01-20

## 2023-01-20 RX ORDER — ONDANSETRON 8 MG/1
8 TABLET, ORALLY DISINTEGRATING ORAL EVERY 8 HOURS PRN
Status: DISCONTINUED | OUTPATIENT
Start: 2023-01-20 | End: 2023-01-28 | Stop reason: HOSPADM

## 2023-01-20 RX ORDER — ACETAMINOPHEN 500 MG
1000 TABLET ORAL EVERY 8 HOURS
Status: DISCONTINUED | OUTPATIENT
Start: 2023-01-20 | End: 2023-01-28 | Stop reason: HOSPADM

## 2023-01-20 RX ORDER — SODIUM CHLORIDE 0.9 % (FLUSH) 0.9 %
5 SYRINGE (ML) INJECTION
Status: DISCONTINUED | OUTPATIENT
Start: 2023-01-20 | End: 2023-01-28 | Stop reason: HOSPADM

## 2023-01-20 RX ORDER — NALOXONE HCL 0.4 MG/ML
0.02 VIAL (ML) INJECTION
Status: DISCONTINUED | OUTPATIENT
Start: 2023-01-20 | End: 2023-01-28 | Stop reason: HOSPADM

## 2023-01-20 RX ORDER — GLUCAGON 1 MG
1 KIT INJECTION
Status: DISCONTINUED | OUTPATIENT
Start: 2023-01-20 | End: 2023-01-28 | Stop reason: HOSPADM

## 2023-01-20 RX ORDER — IBUPROFEN 200 MG
1 TABLET ORAL DAILY
Status: DISCONTINUED | OUTPATIENT
Start: 2023-01-20 | End: 2023-01-28 | Stop reason: HOSPADM

## 2023-01-20 RX ORDER — ACETAMINOPHEN 500 MG
1000 TABLET ORAL EVERY 8 HOURS PRN
Status: DISCONTINUED | OUTPATIENT
Start: 2023-01-20 | End: 2023-01-20

## 2023-01-20 RX ORDER — LORAZEPAM 0.5 MG/1
1 TABLET ORAL EVERY 6 HOURS PRN
Status: DISCONTINUED | OUTPATIENT
Start: 2023-01-20 | End: 2023-01-28 | Stop reason: HOSPADM

## 2023-01-20 RX ORDER — TALC
6 POWDER (GRAM) TOPICAL NIGHTLY PRN
Status: DISCONTINUED | OUTPATIENT
Start: 2023-01-20 | End: 2023-01-28 | Stop reason: HOSPADM

## 2023-01-20 RX ORDER — CIPROFLOXACIN 2 MG/ML
400 INJECTION, SOLUTION INTRAVENOUS
Status: COMPLETED | OUTPATIENT
Start: 2023-01-20 | End: 2023-01-20

## 2023-01-20 RX ORDER — IBUPROFEN 600 MG/1
600 TABLET ORAL EVERY 6 HOURS PRN
Status: DISCONTINUED | OUTPATIENT
Start: 2023-01-20 | End: 2023-01-27

## 2023-01-20 RX ORDER — IBUPROFEN 200 MG
24 TABLET ORAL
Status: DISCONTINUED | OUTPATIENT
Start: 2023-01-20 | End: 2023-01-28 | Stop reason: HOSPADM

## 2023-01-20 RX ORDER — POLYETHYLENE GLYCOL 3350 17 G/17G
17 POWDER, FOR SOLUTION ORAL DAILY
Status: DISCONTINUED | OUTPATIENT
Start: 2023-01-20 | End: 2023-01-28 | Stop reason: HOSPADM

## 2023-01-20 RX ORDER — SIMETHICONE 80 MG
1 TABLET,CHEWABLE ORAL 4 TIMES DAILY PRN
Status: DISCONTINUED | OUTPATIENT
Start: 2023-01-20 | End: 2023-01-28 | Stop reason: HOSPADM

## 2023-01-20 RX ORDER — IBUPROFEN 200 MG
16 TABLET ORAL
Status: DISCONTINUED | OUTPATIENT
Start: 2023-01-20 | End: 2023-01-28 | Stop reason: HOSPADM

## 2023-01-20 RX ORDER — BISACODYL 10 MG
10 SUPPOSITORY, RECTAL RECTAL DAILY PRN
Status: DISCONTINUED | OUTPATIENT
Start: 2023-01-20 | End: 2023-01-28 | Stop reason: HOSPADM

## 2023-01-20 RX ORDER — MAG HYDROX/ALUMINUM HYD/SIMETH 200-200-20
30 SUSPENSION, ORAL (FINAL DOSE FORM) ORAL 4 TIMES DAILY PRN
Status: DISCONTINUED | OUTPATIENT
Start: 2023-01-20 | End: 2023-01-28 | Stop reason: HOSPADM

## 2023-01-20 RX ORDER — OXYCODONE HYDROCHLORIDE 5 MG/1
5 TABLET ORAL EVERY 6 HOURS PRN
Status: DISCONTINUED | OUTPATIENT
Start: 2023-01-20 | End: 2023-01-22

## 2023-01-20 RX ADMIN — OXYCODONE HYDROCHLORIDE 5 MG: 5 TABLET ORAL at 05:01

## 2023-01-20 RX ADMIN — SODIUM CHLORIDE 500 ML: 9 INJECTION, SOLUTION INTRAVENOUS at 03:01

## 2023-01-20 RX ADMIN — KETOROLAC TROMETHAMINE 10 MG: 30 INJECTION, SOLUTION INTRAMUSCULAR; INTRAVENOUS at 01:01

## 2023-01-20 RX ADMIN — CIPROFLOXACIN 400 MG: 2 INJECTION, SOLUTION INTRAVENOUS at 05:01

## 2023-01-20 RX ADMIN — CIPROFLOXACIN 400 MG: 2 INJECTION, SOLUTION INTRAVENOUS at 06:01

## 2023-01-20 RX ADMIN — POLYETHYLENE GLYCOL 3350 17 G: 17 POWDER, FOR SOLUTION ORAL at 05:01

## 2023-01-20 RX ADMIN — ACETAMINOPHEN 1000 MG: 500 TABLET ORAL at 05:01

## 2023-01-20 RX ADMIN — ACETAMINOPHEN 1000 MG: 500 TABLET ORAL at 09:01

## 2023-01-20 RX ADMIN — NICOTINE 1 PATCH: 14 PATCH, EXTENDED RELEASE TRANSDERMAL at 01:01

## 2023-01-20 NOTE — HPI
"Endy Bruno is a 47 y.o. 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 Patient presented to the emergency department due to concern for wound infection. Per chart review, patient had been d/c'ed following his orchiectomy to an LTAC for IV abx and wound care for his open scrotal wound. He reportedly left AMA because he was not allowed visitors on 1/13/22. He has not since received abx nor wound care and is self reported to be "living on the streets." He presents today after noticing that his wound was "leaking pus" and for worsening pain. Reports most recent IVDU prior to arrival. He endorses chills and nausea. Denies chest pain, shortness of breath, urinary changes, diarrhea, or vomiting.    ED: mildly hypotensive at 95/53 and tachycardic up to 102 bpm. Low grade temp of 99.9. WBC of 6.98K, Hb 10.7. CMP within normal limitis. UA negative. Wound cultures obtained. Scrotal US with evidence of interval postoperative change of right orchiectomy in this patient with reported history of torsion.  There is a 0.8 cm anechoic focus in the testicular fossa resection bed with adjacent soft tissue thickening/edema and hyperemia noting heterogeneous echogenicity at the reported incision site.  Findings could relate to underlying soft tissue infection/cellulitis in the appropriate clinical setting there is postoperative change with associated postoperative fluid/fluid collection noting sterility of fluid cannot be assessed on ultrasound.  Correlation with physical exam and appropriate subspecialty consultation advised. Urology consulted and packed the wound at the bedside with iodoform gauze following washout with iodine and NS. There were no areas requiring debridement. Lower extremity US without DVT. Given IV ciprofloxacin, toradol, and a nicotine patch.  "

## 2023-01-20 NOTE — PLAN OF CARE
"SW visited briefly with pt.  PT stated he was at [facility] and "it felt like I was in prison" as he could not smoke or have visitors.  SW asked if pt would like to return to [facility] or another LTAC.  Pt stated he would like another LTAC if possible.    Nika Umana, ISIDORO, MSW, LMSW, RSW   Case Management  [facility]  Email: [email or web address]    "

## 2023-01-20 NOTE — HPI
Mr. Bruno is a 47-year-old male with past medical history of heroin abuse, testicular torsion status post orchiectomy with scrotal abscess I/D (1/7/23) admitted since 1/20/23 for scrotal infection. Urology consulted for possible change in wound.    Patient previously discharged to LTAC for IV abx and wound care for his open scrotal wound. He left AMA after 2 days because he was not allowed visitors on 1/13/22. He was without antibiotics or medical care for 2 days. He used IV heroin on the second day. He noticed pus and presented to the ED. He denies fevers, chills, n/v/d, diaphoresis, hematuria and dysuria.     Upon assessment, AFVSS resting comfortably.     Scrotal US shows normal left testicle with good blood flow and signs of cellulitis and hyperemia within the right yaya-scrotum without a drainable abscess cavity.

## 2023-01-20 NOTE — ASSESSMENT & PLAN NOTE
- patient's anemia is currently controlled.  - current CBC reviewed -   Lab Results   Component Value Date    HGB 10.7 (L) 01/20/2023    HCT 34.0 (L) 01/20/2023     - monitor serial CBC and transfuse if patient becomes hemodynamically unstable, symptomatic, or H/H drops below 7/21.

## 2023-01-20 NOTE — ED PROVIDER NOTES
Encounter Date: 1/19/2023       History     Chief Complaint   Patient presents with    Post-op Problem     Had right testicle removed at beginning after January after torsion, states had packing removed last night. Now having swelling to legs and drainage from incision. Denies fever/chills     Mr. 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  Patient had been discharged on LTAC and he had left AMA on 01/13/2023 prior to that he was receiving ciprofloxacin through PICC line.  Patient states that the line was taken out and he was discharged without antibiotics. He had been managing his pain at home but then he states that today he was taking a shower when the packing had fallen out and he noticed purulent discharge from his scrotum. He also states that he is having severe pain in his scrotum.  He states that he has been feeling fatigued but he denies fevers or chills.  Patient denies recent drug use.     The history is provided by the patient.   Review of patient's allergies indicates:   Allergen Reactions    Penicillins Anaphylaxis    Aspirin Rash     In childhood. Rash and bruising. Tolerates other NSAIDs including ibuprofen     No past medical history on file.  Past Surgical History:   Procedure Laterality Date    INCISION AND DRAINAGE OF ABSCESS Right 1/7/2023    Procedure: INCISION AND DRAINAGE, ABSCESS;  Surgeon: Liyah Shetty MD;  Location: [address];  Service: Urology;  Laterality: Right;    ORCHIECTOMY Right 1/7/2023    Procedure: ORCHIECTOMY;  Surgeon: Liyah Shetty MD;  Location: [address];  Service: Urology;  Laterality: Right;    SCROTUM EXPLORATION Right 1/7/2023    Procedure: EXPLORATION, SCROTUM;  Surgeon: Liyah Shetty MD;  Location: [address];  Service: Urology;  Laterality: Right;     No family history on  file.  Social History     Tobacco Use    Smoking status: Every Day     Packs/day: 1.00     Types: Cigarettes    Smokeless tobacco: Never   Substance Use Topics    Alcohol use: No    Drug use: Yes     Types: IV     Comment: fentanyl IV daily     Review of Systems   Constitutional:  Positive for fatigue. Negative for chills, diaphoresis and fever.   HENT:  Negative for congestion, rhinorrhea and sore throat.    Eyes:  Negative for visual disturbance.   Respiratory:  Negative for cough, chest tightness and shortness of breath.    Cardiovascular:  Negative for chest pain.   Gastrointestinal:  Negative for abdominal pain, blood in stool, constipation, diarrhea and vomiting.   Genitourinary:  Positive for scrotal swelling and testicular pain. Negative for dysuria, hematuria and urgency.   Musculoskeletal:  Negative for back pain.   Skin:  Positive for wound. Negative for rash.   Neurological:  Negative for seizures and syncope.   Hematological:  Does not bruise/bleed easily.   Psychiatric/Behavioral:  Negative for agitation and hallucinations.      Physical Exam     Initial Vitals [01/20/23 0001]   BP Pulse Resp Temp SpO2   123/62 98 18 97.7 °F (36.5 °C) 98 %      MAP       --         Physical Exam     Nursing note and vitals reviewed.  Constitutional: Patient appears well-developed and well-nourished. No distress. AxOx3, NAD, well nourished, appears stated age  HENT:   Head: Normocephalic and atraumatic.   Eyes: Conjunctivae and EOM are normal. Pupils are equal, round, and reactive to light. no scleral icterus, no periorbital edema or ecchymosis  Neck: Neck supple. no stridor, no masses, no drooling or voice changes  Normal range of motion.  Cardiovascular: Normal rate, regular rhythm, normal heart sounds and intact distal pulses. no m/r/g  Pulmonary/Chest: Breath sounds normal. CTAB, no wheezes, rales or rhonchi, no increased work of breathing  Abdominal: Abdomen is soft. Patient exhibits no distension. There is no  abdominal tenderness. no organomegaly, no CVAT  Musculoskeletal:      Cervical back: Normal range of motion and neck supple.   Neurological: Patient is alert and oriented to person, place, and time. No cranial nerve deficit. Gait normal. GCS score is 15. Moving all extremities, gait intact, face grossly symmetric  Skin: Skin is warm and dry.  Presence of open wound in area of right scrotum, draining purulent fluid.  Evidence of redness around the area concerning for cellulitis.   Ext: no edema, no lesions, rashes, or deformity      ED Course   Procedures  Labs Reviewed   CBC W/ AUTO DIFFERENTIAL - Abnormal; Notable for the following components:       Result Value    RBC 4.08 (*)     Hemoglobin 10.7 (*)     Hematocrit 34.0 (*)     MCH 26.2 (*)     MCHC 31.5 (*)     RDW 15.5 (*)     All other components within normal limits   COMPREHENSIVE METABOLIC PANEL - Abnormal; Notable for the following components:    Albumin 3.3 (*)     All other components within normal limits   CULTURE, AEROBIC  (SPECIFY SOURCE)   CULTURE, ANAEROBIC   CULTURE, AEROBIC  (SPECIFY SOURCE)   URINALYSIS, REFLEX TO URINE CULTURE    Narrative:     Specimen Source->Urine          Imaging Results               US Scrotum And Testicles (Final result)  Result time 01/20/23 03:47:28      Final result by Zenia Naik MD (01/20/23 03:47:28)                   Impression:      1. Interval postoperative change of right orchiectomy in this patient with reported history of torsion.  There is a 0.8 cm anechoic focus in the testicular fossa resection bed with adjacent soft tissue thickening/edema and hyperemia noting heterogeneous echogenicity at the reported incision site.  Findings could relate to underlying soft tissue infection/cellulitis in the appropriate clinical setting there is postoperative change with associated postoperative fluid/fluid collection noting sterility of fluid cannot be assessed on ultrasound.  Correlation with physical exam and  appropriate subspecialty consultation advised.  2. Questionable left-sided varicocele with impending formed plexus veins measuring up to 3 mm.  Otherwise unremarkable sonographic evaluation of the left testicle.  This report was flagged in Epic as abnormal.    Electronically signed by resident: Dex Castillo  Date:    01/20/2023  Time:    02:54    Electronically signed by: Zenia Naik MD  Date:    01/20/2023  Time:    03:47               Narrative:    EXAMINATION:  US SCROTUM AND TESTICLES    CLINICAL HISTORY:  Other injury of unspecified body region, initial encounter    TECHNIQUE:  Sonography of the scrotum and testes.    COMPARISON:  Scrotal ultrasound 01/07/2023.    FINDINGS:  Right Testicle:    There is interval postoperative change of right orchiectomy in this patient with reported history of testicular torsion.  There is approximately 0.8 cm anechoic focus within the right testicular fossa noting there is adjacent heterogeneous edema/skin thickening and hyperemia.  Additionally there is heterogeneous echogenicity about the reported incision site.  Findings could relate to underlying postoperative change versus soft tissue infection/cellulitis in the appropriate clinical setting with postoperative fluid versus fluid collection noting sterility cannot be assessed on ultrasound.    Left Testicle:    *Size: 4.0 x 2.1 x 2.7 cm  *Appearance: Normal.  *Flow: Normal arterial and venous flow  *Epididymis: Normal.  *Hydrocele: None.  *Varicocele: Questionable left-sided varicocele noting pampiniform plexus veins measure up to 3 mm.  Other findings: None.                                       US Lower Extremity Veins Bilateral (Final result)  Result time 01/20/23 03:40:40      Final result by Zenia Naik MD (01/20/23 03:40:40)                   Impression:      No evidence of deep venous thrombosis in either lower extremity.    Enlarged bilateral inguinal lymph nodes of uncertain etiology/clinical significance.   Clinical correlation is advised.    Electronically signed by resident: Dex Castillo  Date:    01/20/2023  Time:    02:51    Electronically signed by: Zenia Naik MD  Date:    01/20/2023  Time:    03:40               Narrative:    EXAMINATION:  US LOWER EXTREMITY VEINS BILATERAL    CLINICAL HISTORY:  Other specified soft tissue disorders    TECHNIQUE:  Duplex and color flow Doppler and dynamic compression was performed of the bilateral lower extremity veins was performed.    COMPARISON:  None.    FINDINGS:  Right thigh veins: The common femoral, femoral, popliteal, and upper greater saphenous veins are patent and free of thrombus. The veins are normally compressible and have normal phasic flow and augmentation response.    Right calf veins: The visualized calf veins are patent.    Left thigh veins: The common femoral, femoral, popliteal, and upper greater saphenous veins are patent and free of thrombus. The veins are normally compressible and have normal phasic flow and augmentation response.    Left calf veins: The visualized calf veins are patent.    Miscellaneous: Mild bilateral lower extremity subcutaneous edema.  Enlarged bilateral inguinal lymph nodes measuring 2.1 x 0.9 x 3.0 cm on the left and 3.2 x 0.7 x 1.9 cm on the right.                                       Medications   ciprofloxacin (CIPRO)400mg/200ml D5W IVPB 400 mg (has no administration in time range)   ketorolac injection 9.999 mg (9.999 mg Intravenous Given 1/20/23 0122)     Medical Decision Making:   Initial Assessment:   Mr. 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   Differential Diagnosis:   Cellulitis  Abscess  Testicular torsion  Testicular hematoma  Clinical Tests:   Lab Tests: Ordered and Reviewed  Radiological Study: Ordered and Reviewed  ED Management:  Patient was thoroughly  examined,  Lab workup was obtained which did not show significant abnormalities.  Wound culture was obtained and sent  Ultrasound of the patient's scrotum was obtained which showed concern for cellulitis as well as abscess  Discussion was had with Urology who evaluated the patient  They were able to clean the wound as well as change the packing and they felt that patient was stable for discharge.  Urology advised that patient be returned to LTAC for continued IV antibiotics.  Plan is for patient to be assessed by social work and for patient to be set up for LTAC  Midline team has been consulted to place a PICC line.  Patient was signed out to incoming team.                         Clinical Impression:   Final diagnoses:  [M79.89] Leg swelling  [T14.8XXA, L08.9] Wound infection  [T81.40XA] Post op infection               Anne Marie Fields MD  Resident  01/20/23 0654

## 2023-01-20 NOTE — CONSULTS
"[facility] - Emergency Dept  Urology  Consult Note    Patient Name: Endy Bruno  MRN: [id number]  Admission Date: 1/20/2023  Hospital Length of Stay: 0   Code Status: Prior   Attending Provider: Talia Christianson MD   Consulting Provider: Jim Almendarez MD  Primary Care Physician: Primary Doctor No  Principal Problem:<principal problem not specified>    Inpatient consult to Urology  Consult performed by: Jim Almendarez MD  Consult ordered by: Anne Marie Fields MD          Subjective:     HPI:  Mr. Bruno is a 47-year-old male with past medical history of heroin abuse, testicular torsion status post orchiectomy with scrotal abscess I/D (1/7/23) presents emergency department due to concern for wound infection. Patient had been d/c'ed following his orchiectomy to an LTAC for IV abx and wound care for his open scrotal wound. He reportedly left AMA because he was not allowed visitors on 1/13/22. He has not since received abx nor wound care and is self reported to be "living on the streets." He presents today after noticing that his wound was "leaking pus" and for worsening pain. He denies fevers, chills, n/v/d, diaphoresis, hematuria and dysuria. At bedside, he is AFVSS, WBC 6.98, Cr 0.9, UA not concerning for infection. Scrotal US shows nml left testicle with good blood flow and signs of cellulitis and hyperemia within the right yaya-scrotum without a drainable abscess cavity.       No past medical history on file.    Past Surgical History:   Procedure Laterality Date    INCISION AND DRAINAGE OF ABSCESS Right 1/7/2023    Procedure: INCISION AND DRAINAGE, ABSCESS;  Surgeon: Liyah Shetty MD;  Location: [facility] OR [address] [place] [address];  Service: Urology;  Laterality: Right;    ORCHIECTOMY Right 1/7/2023    Procedure: ORCHIECTOMY;  Surgeon: Liyah Shetty MD;  Location: [facility] OR [address] [place] [address];  Service: Urology;  Laterality: Right;    SCROTUM EXPLORATION Right 1/7/2023    Procedure: EXPLORATION, SCROTUM;  Surgeon: Liyah RIVAS" MD Diogenes;  Location: [facility] OR [address] [place] [address];  Service: Urology;  Laterality: Right;       Review of patient's allergies indicates:   Allergen Reactions    Penicillins Anaphylaxis    Aspirin Rash     In childhood. Rash and bruising. Tolerates other NSAIDs including ibuprofen       Family History    None         Tobacco Use    Smoking status: Every Day     Packs/day: 1.00     Types: Cigarettes    Smokeless tobacco: Never   Substance and Sexual Activity    Alcohol use: No    Drug use: Yes     Types: IV     Comment: fentanyl IV daily    Sexual activity: Not Currently       Review of Systems   Constitutional:  Negative for activity change, fatigue, fever and unexpected weight change.   HENT:  Negative for sore throat and trouble swallowing.    Eyes:  Negative for pain and discharge.   Respiratory:  Negative for chest tightness and shortness of breath.    Cardiovascular:  Negative for chest pain and palpitations.   Gastrointestinal:  Negative for abdominal pain, constipation, diarrhea, nausea and vomiting.   Genitourinary:  Positive for scrotal swelling and testicular pain. Negative for difficulty urinating, dysuria, flank pain, hematuria and penile discharge.        As per HPI   Musculoskeletal:  Negative for back pain and gait problem.   Skin:  Positive for wound. Negative for rash.   Neurological:  Negative for seizures and numbness.   Psychiatric/Behavioral:  Negative for hallucinations. The patient is not nervous/anxious.      Objective:     Temp:  [97.7 °F (36.5 °C)] 97.7 °F (36.5 °C)  Pulse:  [86-98] 86  Resp:  [11-18] 11  SpO2:  [93 %-98 %] 93 %  BP: (109-123)/(55-65) 109/65     Body mass index is 21.63 kg/m².           Drains       None                   Physical Exam  Vitals and nursing note reviewed.   Constitutional:       Appearance: Normal appearance.   HENT:      Head: Atraumatic.      Nose: Nose normal.   Eyes:      Extraocular Movements: Extraocular movements intact.      Pupils: Pupils are equal,  round, and reactive to light.   Cardiovascular:      Rate and Rhythm: Normal rate.   Pulmonary:      Effort: Pulmonary effort is normal.   Abdominal:      General: Abdomen is flat. There is no distension.      Tenderness: There is no abdominal tenderness. There is no right CVA tenderness or left CVA tenderness.   Genitourinary:     Comments: Previous open midline scrotal incision without surrounding necrosis or erythema. No crepitus or fluctuance. Mild induration palpated within the right yaya-scrotum s/p right orchiectomy. No areas of necrosis, fluctuance or crepitus palpated within the wound cavity within the right-hemiscrotum. Minimal erythema and tenderness throughout the right yaya-scrotum.   Musculoskeletal:         General: Normal range of motion.      Cervical back: Normal range of motion.   Skin:     Coloration: Skin is not jaundiced.   Neurological:      General: No focal deficit present.      Mental Status: He is alert and oriented to person, place, and time.   Psychiatric:         Mood and Affect: Mood normal.         Behavior: Behavior normal.       Significant Labs:    BMP:  Recent Labs   Lab 01/13/23  0602 01/20/23  0119    138   K 3.9 4.1    103   CO2 24 25   BUN 14 13   CREATININE 0.7 0.9   CALCIUM 9.1 9.4       CBC:  Recent Labs   Lab 01/20/23  0119   WBC 6.98   HGB 10.7*   HCT 34.0*          All pertinent labs results from the past 24 hours have been reviewed.    Significant Imaging:  All pertinent imaging results/findings from the past 24 hours have been reviewed.                      Assessment and Plan:     Testicular abscess  Endy Bruno is a 47 y.o. male with prior right orchiectomy and right hemiscrotal abscess I/D who presents to the ED with worsening pain and and pus from his open wound.    - Consult d/w Staff Urologist  - No indications for urologic interventions at this time  - Wound packed with iodoform gauze following washout with iodine and NS, no areas requiring  debridement     - Due to living situation, recommend return to LTAC for continued abx and wound care versus hospital admission until appropriate outpt wound care is set up  - Recommend PO Bactrim DS for 7-10 days and to tailor abx as needed once wound cultures result  - [facility] 2-3 weeks for f/u   - All remaining care per primary team  - Please reach out with questions or concerns          VTE Risk Mitigation (From admission, onward)    None          Thank you for your consult. I will sign off. Please contact us if you have any additional questions.    Jim Almendarez MD  Urology  [facility] - Emergency Dept

## 2023-01-20 NOTE — SUBJECTIVE & OBJECTIVE
No past medical history on file.    Past Surgical History:   Procedure Laterality Date    INCISION AND DRAINAGE OF ABSCESS Right 1/7/2023    Procedure: INCISION AND DRAINAGE, ABSCESS;  Surgeon: Liyah Shetty MD;  Location: [address];  Service: Urology;  Laterality: Right;    ORCHIECTOMY Right 1/7/2023    Procedure: ORCHIECTOMY;  Surgeon: Liyah Shetty MD;  Location: [address];  Service: Urology;  Laterality: Right;    SCROTUM EXPLORATION Right 1/7/2023    Procedure: EXPLORATION, SCROTUM;  Surgeon: Liyah Shetty MD;  Location: [address];  Service: Urology;  Laterality: Right;       Review of patient's allergies indicates:   Allergen Reactions    Penicillins Anaphylaxis    Aspirin Rash     In childhood. Rash and bruising. Tolerates other NSAIDs including ibuprofen       Family History    None         Tobacco Use    Smoking status: Every Day     Packs/day: 1.00     Types: Cigarettes    Smokeless tobacco: Never   Substance and Sexual Activity    Alcohol use: No    Drug use: Yes     Types: IV     Comment: fentanyl IV daily    Sexual activity: Not Currently       Review of Systems   Constitutional:  Negative for activity change, fatigue, fever and unexpected weight change.   HENT:  Negative for sore throat and trouble swallowing.    Eyes:  Negative for pain and discharge.   Respiratory:  Negative for chest tightness and shortness of breath.    Cardiovascular:  Negative for chest pain and palpitations.   Gastrointestinal:  Negative for abdominal pain, constipation, diarrhea, nausea and vomiting.   Genitourinary:  Positive for scrotal swelling and testicular pain. Negative for difficulty urinating, dysuria, flank pain, hematuria and penile discharge.        As per HPI   Musculoskeletal:  Negative for back pain and gait problem.   Skin:  Positive for wound. Negative for rash.   Neurological:  Negative for seizures and numbness.   Psychiatric/Behavioral:  Negative for hallucinations. The patient  is not nervous/anxious.      Objective:     Temp:  [97.7 °F (36.5 °C)] 97.7 °F (36.5 °C)  Pulse:  [86-98] 86  Resp:  [11-18] 11  SpO2:  [93 %-98 %] 93 %  BP: (109-123)/(55-65) 109/65     Body mass index is 21.63 kg/m².           Drains       None                   Physical Exam  Vitals and nursing note reviewed.   Constitutional:       Appearance: Normal appearance.   HENT:      Head: Atraumatic.      Nose: Nose normal.   Eyes:      Extraocular Movements: Extraocular movements intact.      Pupils: Pupils are equal, round, and reactive to light.   Cardiovascular:      Rate and Rhythm: Normal rate.   Pulmonary:      Effort: Pulmonary effort is normal.   Abdominal:      General: Abdomen is flat. There is no distension.      Tenderness: There is no abdominal tenderness. There is no right CVA tenderness or left CVA tenderness.   Genitourinary:     Comments: Previous open midline scrotal incision without surrounding necrosis or erythema. No crepitus or fluctuance. Mild induration palpated within the right yaya-scrotum s/p right orchiectomy. No areas of necrosis, fluctuance or crepitus palpated within the wound cavity within the right-hemiscrotum. Minimal erythema and tenderness throughout the right yaya-scrotum.   Musculoskeletal:         General: Normal range of motion.      Cervical back: Normal range of motion.   Skin:     Coloration: Skin is not jaundiced.   Neurological:      General: No focal deficit present.      Mental Status: He is alert and oriented to person, place, and time.   Psychiatric:         Mood and Affect: Mood normal.         Behavior: Behavior normal.       Significant Labs:    BMP:  Recent Labs   Lab 01/13/23  0602 01/20/23  0119    138   K 3.9 4.1    103   CO2 24 25   BUN 14 13   CREATININE 0.7 0.9   CALCIUM 9.1 9.4       CBC:  Recent Labs   Lab 01/20/23 0119   WBC 6.98   HGB 10.7*   HCT 34.0*          All pertinent labs results from the past 24 hours have been  reviewed.    Significant Imaging:  All pertinent imaging results/findings from the past 24 hours have been reviewed.

## 2023-01-20 NOTE — ASSESSMENT & PLAN NOTE
- reports last heroin use prior to arrival  - prn ativan for withdrawal symptoms  - prn anti-emetics

## 2023-01-20 NOTE — SUBJECTIVE & OBJECTIVE
No past medical history on file.    Past Surgical History:   Procedure Laterality Date    INCISION AND DRAINAGE OF ABSCESS Right 1/7/2023    Procedure: INCISION AND DRAINAGE, ABSCESS;  Surgeon: Liyah Shetty MD;  Location: [address];  Service: Urology;  Laterality: Right;    ORCHIECTOMY Right 1/7/2023    Procedure: ORCHIECTOMY;  Surgeon: Liyah Shetty MD;  Location: [address];  Service: Urology;  Laterality: Right;    SCROTUM EXPLORATION Right 1/7/2023    Procedure: EXPLORATION, SCROTUM;  Surgeon: Liyah Shetty MD;  Location: [address];  Service: Urology;  Laterality: Right;       Review of patient's allergies indicates:   Allergen Reactions    Penicillins Anaphylaxis    Aspirin Rash     In childhood. Rash and bruising. Tolerates other NSAIDs including ibuprofen       No current facility-administered medications on file prior to encounter.     No current outpatient medications on file prior to encounter.     Family History    None       Tobacco Use    Smoking status: Every Day     Packs/day: 1.00     Types: Cigarettes    Smokeless tobacco: Never   Substance and Sexual Activity    Alcohol use: No    Drug use: Yes     Types: IV     Comment: fentanyl IV daily    Sexual activity: Not Currently     Review of Systems   Constitutional:  Positive for chills and diaphoresis. Negative for activity change and fever.   HENT:  Negative for trouble swallowing.    Eyes:  Negative for photophobia and visual disturbance.   Respiratory:  Negative for chest tightness, shortness of breath and wheezing.    Cardiovascular:  Negative for chest pain, palpitations and leg swelling.   Gastrointestinal:  Negative for abdominal pain, constipation, diarrhea, nausea and vomiting.   Genitourinary:  Positive for scrotal swelling and testicular pain. Negative for decreased urine volume, difficulty urinating, dysuria, frequency, hematuria and urgency.   Musculoskeletal:  Negative for arthralgias, back pain and gait  problem.   Skin:  Positive for wound. Negative for color change and rash.   Neurological:  Negative for dizziness, syncope, weakness, light-headedness, numbness and headaches.   Psychiatric/Behavioral:  Negative for agitation and confusion. The patient is not nervous/anxious.    Objective:     Vital Signs (Most Recent):  Temp: 99.9 °F (37.7 °C) (01/20/23 1443)  Pulse: 110 (01/20/23 1518)  Resp: 16 (01/20/23 1518)  BP: 115/71 (01/20/23 1518)  SpO2: 98 % (01/20/23 1518) Vital Signs (24h Range):  Temp:  [97.7 °F (36.5 °C)-99.9 °F (37.7 °C)] 99.9 °F (37.7 °C)  Pulse:  [] 110  Resp:  [11-19] 16  SpO2:  [93 %-98 %] 98 %  BP: ()/(53-81) 115/71     Weight: 59 kg (130 lb)  Body mass index is 21.63 kg/m².    Physical Exam  Vitals and nursing note reviewed. Exam conducted with a chaperone present.   Constitutional:       General: He is not in acute distress.     Appearance: He is well-developed. He is diaphoretic.      Comments: Chronically ill-appearing   HENT:      Head: Normocephalic and atraumatic.      Mouth/Throat:      Pharynx: No oropharyngeal exudate.   Eyes:      Extraocular Movements: Extraocular movements intact.      Conjunctiva/sclera: Conjunctivae normal.   Cardiovascular:      Rate and Rhythm: Normal rate and regular rhythm.      Heart sounds: Normal heart sounds.   Pulmonary:      Effort: Pulmonary effort is normal. No respiratory distress.      Breath sounds: Normal breath sounds. No wheezing.   Abdominal:      General: Bowel sounds are normal. There is no distension.      Palpations: Abdomen is soft.      Tenderness: There is no abdominal tenderness.   Genitourinary:     Comments: Previous open midline scrotal incision without surrounding necrosis or erythema. Now packed and clean.   Musculoskeletal:         General: No tenderness. Normal range of motion.      Cervical back: Normal range of motion and neck supple.   Lymphadenopathy:      Cervical: No cervical adenopathy.   Skin:     General: Skin  is warm.      Capillary Refill: Capillary refill takes less than 2 seconds.      Findings: No rash.   Neurological:      Mental Status: He is alert and oriented to person, place, and time.      Cranial Nerves: No cranial nerve deficit.      Sensory: No sensory deficit.      Coordination: Coordination normal.   Psychiatric:         Behavior: Behavior normal.         Thought Content: Thought content normal.         Judgment: Judgment normal.           Significant Labs: All pertinent labs within the past 24 hours have been reviewed.  CBC:   Recent Labs   Lab 01/20/23  0119   WBC 6.98   HGB 10.7*   HCT 34.0*        CMP:   Recent Labs   Lab 01/20/23  0119      K 4.1      CO2 25   GLU 82   BUN 13   CREATININE 0.9   CALCIUM 9.4   PROT 7.0   ALBUMIN 3.3*   BILITOT 0.3   ALKPHOS 101   AST 13   ALT 11   ANIONGAP 10     Urine Studies:   Recent Labs   Lab 01/20/23  0345   COLORU Yellow   APPEARANCEUA Clear   PHUR 7.0   SPECGRAV 1.015   PROTEINUA Negative   GLUCUA Negative   KETONESU Negative   BILIRUBINUA Negative   OCCULTUA Negative   NITRITE Negative   LEUKOCYTESUR Negative       Significant Imaging: I have reviewed all pertinent imaging results/findings within the past 24 hours.

## 2023-01-20 NOTE — PLAN OF CARE
[facility] - Emergency Dept  Discharge Assessment    Primary Care Provider: Primary Doctor No     Pt is currently homeless at no fixed address.  Pt was d/c to [facility] and went AMA on 01/15.      Pt presented to [facility] ED with worsening symptoms.      DYLAN sent referrals via [employer].  Pt was re-accepted to [facility] and are awaiting authorization from insurance.  DYLAN sent to leadership to expedite       Discharge Assessment (most recent)       BRIEF DISCHARGE ASSESSMENT - 01/20/23 8883          Discharge Planning    Assessment Type Discharge Planning Brief Assessment (P)      Resource/Environmental Concerns environmental;financial;reliable transportation;home accessibility (P)      Environment Concerns no permanent residence (P)      Financial Concerns unemployed (P)      Equipment Currently Used at Home none (P)      Current Living Arrangements shelter (P)      Patient/Family Anticipates Transition to -- (P)    LTAC    Patient/Family Anticipated Services at Transition outpatient care (P)      DME Needed Upon Discharge  none (P)      Discharge Plan A Long-term acute care facility (LTAC) (P)      Discharge Plan B Long-term acute care facility (LTAC) (P)                      Nika Umana CD, MSW, LMSW, RSW   Case Management  [facility]  Email: [email or web address]

## 2023-01-20 NOTE — PHARMACY MED REC
"            Admission Medication History     The home medication history was taken by Don Galloway.    You may go to "Admission" then "Reconcile Home Medications" tabs to review and/or act upon these items.     The home medication list has been updated by the Pharmacy department.   Please read ALL comments highlighted in yellow.   Please address this information as you see fit.    Feel free to contact us if you have any questions or require assistance.      PATIENT HAS NO RECENT FILL HISTORY ON FILE      Potential issues to be addressed PRIOR TO DISCHARGE  Please discuss with the patient barriers to adherence with medication treatment plans  Patient requires education regarding drug therapies     Don Galloway  EXT [phone]        .        "

## 2023-01-20 NOTE — PLAN OF CARE
01/20/23 0825   Post-Acute Status   Post-Acute Authorization Placement   Post-Acute Placement Status Referrals Sent   Discharge Delays (!) Patient and Family Barriers   Discharge Plan   Discharge Plan A Long-term acute care facility (LTAC)     SW sent referrals via careport to LTAC      Nika Umana CD, MSW, LMSW, RSW   Case Management  [facility]  Email: [email or web address]

## 2023-01-20 NOTE — PLAN OF CARE
01/20/23 1344   Post-Acute Status   Post-Acute Authorization Placement   Post-Acute Placement Status Pending payor review/awaiting authorization (if required)   Discharge Delays (!) Patient and Family Barriers   Discharge Plan   Discharge Plan A Long-term acute care facility (LTAC)     Pt was not accepted by [facility]    Pt was accepted by [facility].  [facility] has submitted for auth and will be providing SW with pre-auth number for escalation.  Vika ALBA with [facility] visited with pt to discuss pt needs.      Nika Umana, ISIDORO, MSW, LMSW, RSW   Case Management  [facility]  Email: [email or web address]

## 2023-01-20 NOTE — CONSULTS
Single lumen 18x8cm midline placed right basilic vein. Max dwell date 2/18/23, Lot# [id number].  Needle advanced into the vessel under real time ultrasound guidance.  Image recorded and saved.

## 2023-01-20 NOTE — ED NOTES
"This RN went into patient's room to assess him, RN to insert PICC line also in the room. Patient is getting out of bed and taking his cardiac monitoring off. Pt states "I am leaving and going smoke a cigarette. Samina been allowed to leave before with the IV in my arm and go smoke. I haven't eaten or drank in hours and ya'll are taking too long" pt informed that we have been waiting on PICC line insertion so and placement with LTACH, pt informed he was just placed and after this PICC line he will be leaving. Pt states "I don't care you're not poking me and Im leaving" pt proceeds to walk out of room. MD and charge nurse notified. After discussing with patient he agreed to stay and received PICC line. Pt offered food and nicotine patch if he would like so  "

## 2023-01-20 NOTE — H&P
"[facility] - Emergency Dept  [facility]  History & Physical    Patient Name: Endy Bruno  MRN: [id number]  Patient Class: OP- Observation  Admission Date: 1/20/2023  Attending Physician: Dominic Lincoln MD   Primary Care Provider: Primary Doctor No         Patient information was obtained from patient, past medical records, and ER records.     Subjective:     Principal Problem:Testicular abscess    Chief Complaint:   Chief Complaint   Patient presents with    Post-op Problem     Had right testicle removed at beginning after January after torsion, states had packing removed last night. Now having swelling to legs and drainage from incision. Denies fever/chills        HPI: Endy Bruno is a 47 y.o. 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 Patient presented to the emergency department due to concern for wound infection. Per chart review, patient had been d/c'ed following his orchiectomy to an LTAC for IV abx and wound care for his open scrotal wound. He reportedly left AMA because he was not allowed visitors on 1/13/22. He has not since received abx nor wound care and is self reported to be "living on the streets." He presents today after noticing that his wound was "leaking pus" and for worsening pain. Reports most recent IVDU prior to arrival. He endorses chills and nausea. Denies chest pain, shortness of breath, urinary changes, diarrhea, or vomiting.    ED: mildly hypotensive at 95/53 and tachycardic up to 102 bpm. Low grade temp of 99.9. WBC of 6.98K, Hb 10.7. CMP within normal limitis. UA negative. Wound cultures obtained. Scrotal US with evidence of interval postoperative change of right orchiectomy in this patient with reported history of torsion.  There is a 0.8 cm anechoic focus in the testicular fossa resection bed with adjacent soft tissue " thickening/edema and hyperemia noting heterogeneous echogenicity at the reported incision site.  Findings could relate to underlying soft tissue infection/cellulitis in the appropriate clinical setting there is postoperative change with associated postoperative fluid/fluid collection noting sterility of fluid cannot be assessed on ultrasound.  Correlation with physical exam and appropriate subspecialty consultation advised. Urology consulted and packed the wound at the bedside with iodoform gauze following washout with iodine and NS. There were no areas requiring debridement. Lower extremity US without DVT. Given IV ciprofloxacin, toradol, and a nicotine patch.      No past medical history on file.    Past Surgical History:   Procedure Laterality Date    INCISION AND DRAINAGE OF ABSCESS Right 1/7/2023    Procedure: INCISION AND DRAINAGE, ABSCESS;  Surgeon: Liyah Shetty MD;  Location: [facility] OR [address] [place] [address];  Service: Urology;  Laterality: Right;    ORCHIECTOMY Right 1/7/2023    Procedure: ORCHIECTOMY;  Surgeon: Liyah Shetty MD;  Location: [address];  Service: Urology;  Laterality: Right;    SCROTUM EXPLORATION Right 1/7/2023    Procedure: EXPLORATION, SCROTUM;  Surgeon: Liyah Shetty MD;  Location: [address];  Service: Urology;  Laterality: Right;       Review of patient's allergies indicates:   Allergen Reactions    Penicillins Anaphylaxis    Aspirin Rash     In childhood. Rash and bruising. Tolerates other NSAIDs including ibuprofen       No current facility-administered medications on file prior to encounter.     No current outpatient medications on file prior to encounter.     Family History    None       Tobacco Use    Smoking status: Every Day     Packs/day: 1.00     Types: Cigarettes    Smokeless tobacco: Never   Substance and Sexual Activity    Alcohol use: No    Drug use: Yes     Types: IV     Comment: fentanyl IV daily    Sexual activity: Not Currently     Review of Systems    Constitutional:  Positive for chills and diaphoresis. Negative for activity change and fever.   HENT:  Negative for trouble swallowing.    Eyes:  Negative for photophobia and visual disturbance.   Respiratory:  Negative for chest tightness, shortness of breath and wheezing.    Cardiovascular:  Negative for chest pain, palpitations and leg swelling.   Gastrointestinal:  Negative for abdominal pain, constipation, diarrhea, nausea and vomiting.   Genitourinary:  Positive for scrotal swelling and testicular pain. Negative for decreased urine volume, difficulty urinating, dysuria, frequency, hematuria and urgency.   Musculoskeletal:  Negative for arthralgias, back pain and gait problem.   Skin:  Positive for wound. Negative for color change and rash.   Neurological:  Negative for dizziness, syncope, weakness, light-headedness, numbness and headaches.   Psychiatric/Behavioral:  Negative for agitation and confusion. The patient is not nervous/anxious.    Objective:     Vital Signs (Most Recent):  Temp: 99.9 °F (37.7 °C) (01/20/23 1443)  Pulse: 110 (01/20/23 1518)  Resp: 16 (01/20/23 1518)  BP: 115/71 (01/20/23 1518)  SpO2: 98 % (01/20/23 1518) Vital Signs (24h Range):  Temp:  [97.7 °F (36.5 °C)-99.9 °F (37.7 °C)] 99.9 °F (37.7 °C)  Pulse:  [] 110  Resp:  [11-19] 16  SpO2:  [93 %-98 %] 98 %  BP: ()/(53-81) 115/71     Weight: 59 kg (130 lb)  Body mass index is 21.63 kg/m².    Physical Exam  Vitals and nursing note reviewed. Exam conducted with a chaperone present.   Constitutional:       General: He is not in acute distress.     Appearance: He is well-developed. He is diaphoretic.      Comments: Chronically ill-appearing   HENT:      Head: Normocephalic and atraumatic.      Mouth/Throat:      Pharynx: No oropharyngeal exudate.   Eyes:      Extraocular Movements: Extraocular movements intact.      Conjunctiva/sclera: Conjunctivae normal.   Cardiovascular:      Rate and Rhythm: Normal rate and regular rhythm.       Heart sounds: Normal heart sounds.   Pulmonary:      Effort: Pulmonary effort is normal. No respiratory distress.      Breath sounds: Normal breath sounds. No wheezing.   Abdominal:      General: Bowel sounds are normal. There is no distension.      Palpations: Abdomen is soft.      Tenderness: There is no abdominal tenderness.   Genitourinary:     Comments: Previous open midline scrotal incision without surrounding necrosis or erythema. Now packed and clean.   Musculoskeletal:         General: No tenderness. Normal range of motion.      Cervical back: Normal range of motion and neck supple.   Lymphadenopathy:      Cervical: No cervical adenopathy.   Skin:     General: Skin is warm.      Capillary Refill: Capillary refill takes less than 2 seconds.      Findings: No rash.   Neurological:      Mental Status: He is alert and oriented to person, place, and time.      Cranial Nerves: No cranial nerve deficit.      Sensory: No sensory deficit.      Coordination: Coordination normal.   Psychiatric:         Behavior: Behavior normal.         Thought Content: Thought content normal.         Judgment: Judgment normal.           Significant Labs: All pertinent labs within the past 24 hours have been reviewed.  CBC:   Recent Labs   Lab 01/20/23  0119   WBC 6.98   HGB 10.7*   HCT 34.0*        CMP:   Recent Labs   Lab 01/20/23  0119      K 4.1      CO2 25   GLU 82   BUN 13   CREATININE 0.9   CALCIUM 9.4   PROT 7.0   ALBUMIN 3.3*   BILITOT 0.3   ALKPHOS 101   AST 13   ALT 11   ANIONGAP 10     Urine Studies:   Recent Labs   Lab 01/20/23  0345   COLORU Yellow   APPEARANCEUA Clear   PHUR 7.0   SPECGRAV 1.015   PROTEINUA Negative   GLUCUA Negative   KETONESU Negative   BILIRUBINUA Negative   OCCULTUA Negative   NITRITE Negative   LEUKOCYTESUR Negative       Significant Imaging: I have reviewed all pertinent imaging results/findings within the past 24 hours.  Assessment/Plan:     * Testicular abscess  Surgical  wound present     - s/p right orchiectomy and right hemiscrotal abscess I&D   - mildly hypotensive and low grade fever in the ED  - 500 cc IVF ordered  - US of scrotum and testicles with evidence of interval postoperative change of right orchiectomy in this patient with reported history of torsion.  There is a 0.8 cm anechoic focus in the testicular fossa resection bed with adjacent soft tissue thickening/edema and hyperemia noting heterogeneous echogenicity at the reported incision site.  Findings could relate to underlying soft tissue infection/cellulitis in the appropriate clinical setting there is postoperative change with associated postoperative fluid/fluid collection noting sterility of fluid cannot be assessed on ultrasound.   - urology consulted in the ED who cleaned and packed wound  - recommend continuing abx and return to LTAC  - appreciate SW/CM assistance with placement  - will need urology follow-up  - prn analgesics          Opioid use disorder, severe, dependence  - reports last heroin use prior to arrival  - prn ativan for withdrawal symptoms  - prn anti-emetics      Anemia of infection and chronic disease  - patient's anemia is currently controlled.  - current CBC reviewed -   Lab Results   Component Value Date    HGB 10.7 (L) 01/20/2023    HCT 34.0 (L) 01/20/2023     - monitor serial CBC and transfuse if patient becomes hemodynamically unstable, symptomatic, or H/H drops below 7/21.         Dependence on nicotine from cigarettes  - prn nicotine patch        VTE Risk Mitigation (From admission, onward)           Ordered     IP VTE HIGH RISK PATIENT  Once         01/20/23 1550     Reason for No Pharmacological VTE Prophylaxis  Once        Question:  Reasons:  Answer:  Risk of Bleeding    01/20/23 1550                       Nyasia Ortega PA-C  Department of Hospital Medicine   [facility] - Emergency Dept

## 2023-01-20 NOTE — PLAN OF CARE
Problem: Adult Inpatient Plan of Care  Goal: Plan of Care Review  Outcome: Ongoing, Not Progressing  Goal: Patient-Specific Goal (Individualized)  Outcome: Ongoing, Not Progressing  Goal: Absence of Hospital-Acquired Illness or Injury  Outcome: Ongoing, Not Progressing  Goal: Optimal Comfort and Wellbeing  Outcome: Ongoing, Not Progressing  Goal: Readiness for Transition of Care  Outcome: Ongoing, Not Progressing     Problem: Infection  Goal: Absence of Infection Signs and Symptoms  Outcome: Ongoing, Not Progressing     Pt tx from ED, VSS. Pt c/o pain, prn adm. Voiding in urinal. Pt non-compliant with questions. Bed lowered.

## 2023-01-20 NOTE — ASSESSMENT & PLAN NOTE
Surgical wound present     - s/p right orchiectomy and right hemiscrotal abscess I&D   - mildly hypotensive and low grade fever in the ED  - 500 cc IVF ordered  - US of scrotum and testicles with evidence of interval postoperative change of right orchiectomy in this patient with reported history of torsion.  There is a 0.8 cm anechoic focus in the testicular fossa resection bed with adjacent soft tissue thickening/edema and hyperemia noting heterogeneous echogenicity at the reported incision site.  Findings could relate to underlying soft tissue infection/cellulitis in the appropriate clinical setting there is postoperative change with associated postoperative fluid/fluid collection noting sterility of fluid cannot be assessed on ultrasound.   - urology consulted in the ED who cleaned and packed wound  - recommend continuing abx and return to LTAC  - appreciate SW/CM assistance with placement  - will need urology follow-up  - prn analgesics

## 2023-01-20 NOTE — ASSESSMENT & PLAN NOTE
Endy Bruno is a 47 y.o. male with prior right orchiectomy and right hemiscrotal abscess I/D who presents to the ED with worsening pain and and pus from his open wound.    - Consult d/w Staff Urologist  - No indications for urologic interventions at this time  - Wound packed with iodoform gauze following washout with iodine and NS, no areas requiring debridement     - Due to living situation, recommend return to LTAC for continued abx and wound care versus hospital admission until appropriate outpt wound care is set up  - Recommend PO Bactrim DS for 7-10 days and to tailor abx as needed once wound cultures result  - Patient to be [facility] in [facility] in 2-3 weeks for f/u   - All remaining care per primary team  - Please reach out with questions or concerns

## 2023-01-20 NOTE — ED NOTES
I have assumed care of the pt at this time from LUDIVINA Wallace; two pt identifiers verbally confirmed w/ pt; pt is Endy Bruno,  1975.     Pt sleeping on stretcher, easily aroused. Pt remains on continuous cardiac and pulse ox monitoring with non-invasive blood pressure to cycle every 30 minutes.  VS stable; NSR noted. Pt denies pain at this time; states he wants to smoke a cigarette. No acute distress or discomfort reported or observed.  Pt denies restroom needs at this time; urinal at bedside. Able to reposition self on stretcher. Bed locked in lowest position; side rails up and locked x 2; call light, bedside table, and personal belongings within reach. Room assessed for safety measures and cleanliness; no action needed at this time. Plan of care discussed.  Pt instructed to alert nurse for assistance and before attempting to get out of bed; verbalizes understanding.

## 2023-01-20 NOTE — PLAN OF CARE
[facility] pre-authorization # [id number]    SW to escalate authorization      Nika Umana CD, MSW, LMSW, RSW   Case Management  [facility]  Email: [email or web address]

## 2023-01-20 NOTE — PROVIDER PROGRESS NOTES - EMERGENCY DEPT.
Encounter Date: 1/19/2023    ED Physician Progress Notes        Physician Note:   Pt signed out to me at 7 AM    Briefly: Pt is a 47 year old male with PMHx of heroin abuse, testicular torsion status post orchiectomy (1/7/23) who presents to the emergency department due to concern for wound infection.  Patient had been discharged on LTAC, but left AMA on 01/13/2023. He had been receiving cipro at LTAC. Pt presents today due to concern that wound has worsened as he has been off of antibiotics since leaving the LTAC. Urology consulted in the ED who recommended continuation of antibiotics and placement in LTAC. Unable to place pt in LTAC due to his recent AMA and insurance authorization because of it. Social work has helped facilitate this and are working to expedite his placement. Pt to be admitted to hospital medicine for further management and evaluation until LTAC placement can be obtained.     Dispo: admit

## 2023-01-21 LAB
ALBUMIN SERPL BCP-MCNC: 3.2 G/DL (ref 3.5–5.2)
ALP SERPL-CCNC: 91 U/L (ref 55–135)
ALT SERPL W/O P-5'-P-CCNC: 13 U/L (ref 10–44)
ANION GAP SERPL CALC-SCNC: 10 MMOL/L (ref 8–16)
AST SERPL-CCNC: 14 U/L (ref 10–40)
BASOPHILS # BLD AUTO: 0.05 K/UL (ref 0–0.2)
BASOPHILS NFR BLD: 0.4 % (ref 0–1.9)
BILIRUB SERPL-MCNC: 0.5 MG/DL (ref 0.1–1)
BUN SERPL-MCNC: 12 MG/DL (ref 6–20)
CALCIUM SERPL-MCNC: 9 MG/DL (ref 8.7–10.5)
CHLORIDE SERPL-SCNC: 100 MMOL/L (ref 95–110)
CO2 SERPL-SCNC: 21 MMOL/L (ref 23–29)
CREAT SERPL-MCNC: 0.9 MG/DL (ref 0.5–1.4)
DIFFERENTIAL METHOD: ABNORMAL
EOSINOPHIL # BLD AUTO: 0.1 K/UL (ref 0–0.5)
EOSINOPHIL NFR BLD: 0.5 % (ref 0–8)
ERYTHROCYTE [DISTWIDTH] IN BLOOD BY AUTOMATED COUNT: 15.8 % (ref 11.5–14.5)
EST. GFR  (NO RACE VARIABLE): >60 ML/MIN/1.73 M^2
GLUCOSE SERPL-MCNC: 134 MG/DL (ref 70–110)
HCT VFR BLD AUTO: 34 % (ref 40–54)
HGB BLD-MCNC: 11 G/DL (ref 14–18)
IMM GRANULOCYTES # BLD AUTO: 0.05 K/UL (ref 0–0.04)
IMM GRANULOCYTES NFR BLD AUTO: 0.4 % (ref 0–0.5)
LYMPHOCYTES # BLD AUTO: 0.8 K/UL (ref 1–4.8)
LYMPHOCYTES NFR BLD: 6.6 % (ref 18–48)
MCH RBC QN AUTO: 26.2 PG (ref 27–31)
MCHC RBC AUTO-ENTMCNC: 32.4 G/DL (ref 32–36)
MCV RBC AUTO: 81 FL (ref 82–98)
MONOCYTES # BLD AUTO: 0.5 K/UL (ref 0.3–1)
MONOCYTES NFR BLD: 4.6 % (ref 4–15)
NEUTROPHILS # BLD AUTO: 10 K/UL (ref 1.8–7.7)
NEUTROPHILS NFR BLD: 87.5 % (ref 38–73)
NRBC BLD-RTO: 0 /100 WBC
PLATELET # BLD AUTO: 267 K/UL (ref 150–450)
PMV BLD AUTO: 10.9 FL (ref 9.2–12.9)
POTASSIUM SERPL-SCNC: 3.5 MMOL/L (ref 3.5–5.1)
PROT SERPL-MCNC: 6.9 G/DL (ref 6–8.4)
RBC # BLD AUTO: 4.2 M/UL (ref 4.6–6.2)
SODIUM SERPL-SCNC: 131 MMOL/L (ref 136–145)
WBC # BLD AUTO: 11.39 K/UL (ref 3.9–12.7)

## 2023-01-21 PROCEDURE — 80053 COMPREHEN METABOLIC PANEL: CPT

## 2023-01-21 PROCEDURE — 25000003 PHARM REV CODE 250

## 2023-01-21 PROCEDURE — G0378 HOSPITAL OBSERVATION PER HR: HCPCS

## 2023-01-21 PROCEDURE — 25000003 PHARM REV CODE 250: Performed by: STUDENT IN AN ORGANIZED HEALTH CARE EDUCATION/TRAINING PROGRAM

## 2023-01-21 PROCEDURE — 63600175 PHARM REV CODE 636 W HCPCS

## 2023-01-21 PROCEDURE — 85025 COMPLETE CBC W/AUTO DIFF WBC: CPT

## 2023-01-21 PROCEDURE — 12000002 HC ACUTE/MED SURGE SEMI-PRIVATE ROOM

## 2023-01-21 PROCEDURE — S4991 NICOTINE PATCH NONLEGEND: HCPCS

## 2023-01-21 PROCEDURE — 63600175 PHARM REV CODE 636 W HCPCS: Performed by: STUDENT IN AN ORGANIZED HEALTH CARE EDUCATION/TRAINING PROGRAM

## 2023-01-21 PROCEDURE — 99232 SBSQ HOSP IP/OBS MODERATE 35: CPT | Mod: ,,, | Performed by: STUDENT IN AN ORGANIZED HEALTH CARE EDUCATION/TRAINING PROGRAM

## 2023-01-21 PROCEDURE — 36415 COLL VENOUS BLD VENIPUNCTURE: CPT

## 2023-01-21 PROCEDURE — 99232 PR SUBSEQUENT HOSPITAL CARE,LEVL II: ICD-10-PCS | Mod: ,,, | Performed by: STUDENT IN AN ORGANIZED HEALTH CARE EDUCATION/TRAINING PROGRAM

## 2023-01-21 RX ORDER — SULFAMETHOXAZOLE AND TRIMETHOPRIM 800; 160 MG/1; MG/1
1 TABLET ORAL 2 TIMES DAILY
Status: DISCONTINUED | OUTPATIENT
Start: 2023-01-21 | End: 2023-01-28 | Stop reason: HOSPADM

## 2023-01-21 RX ADMIN — NICOTINE 1 PATCH: 14 PATCH, EXTENDED RELEASE TRANSDERMAL at 08:01

## 2023-01-21 RX ADMIN — OXYCODONE HYDROCHLORIDE 5 MG: 5 TABLET ORAL at 12:01

## 2023-01-21 RX ADMIN — SULFAMETHOXAZOLE AND TRIMETHOPRIM 1 TABLET: 800; 160 TABLET ORAL at 08:01

## 2023-01-21 RX ADMIN — SULFAMETHOXAZOLE AND TRIMETHOPRIM 1 TABLET: 800; 160 TABLET ORAL at 09:01

## 2023-01-21 RX ADMIN — KETOROLAC TROMETHAMINE 15 MG: 30 INJECTION, SOLUTION INTRAMUSCULAR; INTRAVENOUS at 03:01

## 2023-01-21 RX ADMIN — OXYCODONE HYDROCHLORIDE 5 MG: 5 TABLET ORAL at 03:01

## 2023-01-21 RX ADMIN — ACETAMINOPHEN 1000 MG: 500 TABLET ORAL at 06:01

## 2023-01-21 RX ADMIN — KETOROLAC TROMETHAMINE 15 MG: 30 INJECTION, SOLUTION INTRAMUSCULAR; INTRAVENOUS at 12:01

## 2023-01-21 RX ADMIN — IBUPROFEN 600 MG: 600 TABLET, FILM COATED ORAL at 08:01

## 2023-01-21 RX ADMIN — PROCHLORPERAZINE EDISYLATE 5 MG: 5 INJECTION INTRAMUSCULAR; INTRAVENOUS at 11:01

## 2023-01-21 RX ADMIN — ONDANSETRON 8 MG: 8 TABLET, ORALLY DISINTEGRATING ORAL at 07:01

## 2023-01-21 RX ADMIN — OXYCODONE HYDROCHLORIDE 5 MG: 5 TABLET ORAL at 06:01

## 2023-01-21 RX ADMIN — CIPROFLOXACIN 400 MG: 2 INJECTION, SOLUTION INTRAVENOUS at 06:01

## 2023-01-21 RX ADMIN — SODIUM CHLORIDE, POTASSIUM CHLORIDE, SODIUM LACTATE AND CALCIUM CHLORIDE 1000 ML: 600; 310; 30; 20 INJECTION, SOLUTION INTRAVENOUS at 05:01

## 2023-01-21 RX ADMIN — ACETAMINOPHEN 1000 MG: 500 TABLET ORAL at 09:01

## 2023-01-21 NOTE — ASSESSMENT & PLAN NOTE
Surgical wound present     - s/p right orchiectomy and right hemiscrotal abscess I&D   - Mildly hypotensive and low grade fever in the ED  - s/p 500 cc IVF   - US of scrotum and testicles with evidence of interval postoperative change of right orchiectomy in this patient with reported history of torsion.  There is a 0.8 cm anechoic focus in the testicular fossa resection bed with adjacent soft tissue thickening/edema and hyperemia noting heterogeneous echogenicity at the reported incision site.  Findings could relate to underlying soft tissue infection/cellulitis in the appropriate clinical setting there is postoperative change with associated postoperative fluid/fluid collection noting sterility of fluid cannot be assessed on ultrasound.   - Urology consulted in the ED who cleaned and packed wound  - Recommend continuing abx with Bactrim DS for 7 - 10 days and return to LTAC  - Appreciate SW/CM assistance with placement  - Will need urology follow-up  - prn analgesics

## 2023-01-21 NOTE — PROGRESS NOTES
"[facility] ([address] Medicine  Progress Note    Patient Name: Endy Bruno  MRN: [id number]  Patient Class: OP- Observation   Admission Date: 1/20/2023  Length of Stay: 0 days  Attending Physician: Dominic Lincoln MD  Primary Care Provider: Primary Doctor No        Subjective:     Principal Problem:Testicular abscess        HPI:  Endy Bruno is a 47 y.o. 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 Patient presented to the emergency department due to concern for wound infection. Per chart review, patient had been d/c'ed following his orchiectomy to an LTAC for IV abx and wound care for his open scrotal wound. He reportedly left AMA because he was not allowed visitors on 1/13/22. He has not since received abx nor wound care and is self reported to be "living on the streets." He presents today after noticing that his wound was "leaking pus" and for worsening pain. Reports most recent IVDU prior to arrival. He endorses chills and nausea. Denies chest pain, shortness of breath, urinary changes, diarrhea, or vomiting.    ED: mildly hypotensive at 95/53 and tachycardic up to 102 bpm. Low grade temp of 99.9. WBC of 6.98K, Hb 10.7. CMP within normal limitis. UA negative. Wound cultures obtained. Scrotal US with evidence of interval postoperative change of right orchiectomy in this patient with reported history of torsion.  There is a 0.8 cm anechoic focus in the testicular fossa resection bed with adjacent soft tissue thickening/edema and hyperemia noting heterogeneous echogenicity at the reported incision site.  Findings could relate to underlying soft tissue infection/cellulitis in the appropriate clinical setting there is postoperative change with associated postoperative fluid/fluid collection noting sterility of fluid cannot be assessed on " ultrasound.  Correlation with physical exam and appropriate subspecialty consultation advised. Urology consulted and packed the wound at the bedside with iodoform gauze following washout with iodine and NS. There were no areas requiring debridement. Lower extremity US without DVT. Given IV ciprofloxacin, toradol, and a nicotine patch.      Overview/Hospital Course:  No notes on file    Interval History:   No events overnight. Denies fevers, chills, dyspnea, and headache. Patient with continue scrotal pain.      Review of Systems  Objective:     Vital Signs (Most Recent):  Temp: 99 °F (37.2 °C) (01/21/23 1117)  Pulse: 92 (01/21/23 1117)  Resp: 15 (01/21/23 1117)  BP: (!) 93/58 (01/21/23 1202)  SpO2: 96 % (01/21/23 1117)   Vital Signs (24h Range):  Temp:  [97.5 °F (36.4 °C)-99.9 °F (37.7 °C)] 99 °F (37.2 °C)  Pulse:  [] 92  Resp:  [15-19] 15  SpO2:  [94 %-99 %] 96 %  BP: ()/(48-97) 93/58     Weight: 59 kg (130 lb)  Body mass index is 21.63 kg/m².    Intake/Output Summary (Last 24 hours) at 1/21/2023 1331  Last data filed at 1/20/2023 1748  Gross per 24 hour   Intake 240 ml   Output 1165 ml   Net -925 ml      Physical Exam  Vitals and nursing note reviewed.   Constitutional:       General: He is not in acute distress.     Appearance: He is well-developed. He is not diaphoretic.      Comments: Chronically ill-appearing   HENT:      Head: Normocephalic and atraumatic.      Mouth/Throat:      Pharynx: No oropharyngeal exudate.   Eyes:      Extraocular Movements: Extraocular movements intact.      Conjunctiva/sclera: Conjunctivae normal.   Cardiovascular:      Rate and Rhythm: Normal rate and regular rhythm.      Heart sounds: Normal heart sounds.   Pulmonary:      Effort: Pulmonary effort is normal. No respiratory distress.      Breath sounds: Normal breath sounds. No wheezing.   Abdominal:      General: Bowel sounds are normal. There is no distension.      Palpations: Abdomen is soft.      Tenderness: There  is no abdominal tenderness.   Genitourinary:     Comments: Previous open midline scrotal incision without surrounding necrosis or erythema. Now packed and clean.   Musculoskeletal:         General: No tenderness. Normal range of motion.      Cervical back: Normal range of motion and neck supple.   Lymphadenopathy:      Cervical: No cervical adenopathy.   Skin:     General: Skin is warm.      Capillary Refill: Capillary refill takes less than 2 seconds.      Findings: No rash.   Neurological:      Mental Status: He is alert and oriented to person, place, and time.      Cranial Nerves: No cranial nerve deficit.      Sensory: No sensory deficit.      Coordination: Coordination normal.   Psychiatric:         Behavior: Behavior normal.         Thought Content: Thought content normal.         Judgment: Judgment normal.       Significant Labs: CBC:   Recent Labs   Lab 01/20/23 0119 01/21/23 0451   WBC 6.98 11.39   HGB 10.7* 11.0*   HCT 34.0* 34.0*    267     CMP:   Recent Labs   Lab 01/20/23 0119 01/21/23 0451    131*   K 4.1 3.5    100   CO2 25 21*   GLU 82 134*   BUN 13 12   CREATININE 0.9 0.9   CALCIUM 9.4 9.0   PROT 7.0 6.9   ALBUMIN 3.3* 3.2*   BILITOT 0.3 0.5   ALKPHOS 101 91   AST 13 14   ALT 11 13   ANIONGAP 10 10       Significant Imaging: I have reviewed all pertinent imaging results/findings within the past 24 hours.      Assessment/Plan:      * Testicular abscess  Surgical wound present     - s/p right orchiectomy and right hemiscrotal abscess I&D   - Mildly hypotensive and low grade fever in the ED  - s/p 500 cc IVF   - US of scrotum and testicles with evidence of interval postoperative change of right orchiectomy in this patient with reported history of torsion.  There is a 0.8 cm anechoic focus in the testicular fossa resection bed with adjacent soft tissue thickening/edema and hyperemia noting heterogeneous echogenicity at the reported incision site.  Findings could relate to  underlying soft tissue infection/cellulitis in the appropriate clinical setting there is postoperative change with associated postoperative fluid/fluid collection noting sterility of fluid cannot be assessed on ultrasound.   - Urology consulted in the ED who cleaned and packed wound  - Recommend continuing abx with Bactrim DS for 7 - 10 days and return to LTAC  - Appreciate SW/CM assistance with placement  - Will need urology follow-up  - prn analgesics          Opioid use disorder, severe, dependence  - reports last heroin use prior to arrival  - prn ativan for withdrawal symptoms  - prn anti-emetics      Anemia of infection and chronic disease  - patient's anemia is currently controlled.  - current CBC reviewed -   Lab Results   Component Value Date    HGB 10.7 (L) 01/20/2023    HCT 34.0 (L) 01/20/2023     - monitor serial CBC and transfuse if patient becomes hemodynamically unstable, symptomatic, or H/H drops below 7/21.         Dependence on nicotine from cigarettes  - prn nicotine patch        VTE Risk Mitigation (From admission, onward)         Ordered     IP VTE HIGH RISK PATIENT  Once         01/20/23 1550     Reason for No Pharmacological VTE Prophylaxis  Once        Question:  Reasons:  Answer:  Risk of Bleeding    01/20/23 1550                Discharge Planning   CEDRIC:      Code Status: Full Code   Is the patient medically ready for discharge?: No    Reason for patient still in hospital (select all that apply): Patient trending condition, Laboratory test, Treatment, Consult recommendations and Pending disposition  Discharge Plan A: Long-term acute care facility (LTAC)   Discharge Delays: (!) Post-Acute Set-up              Dominic Lincoln MD  Department of Hospital Medicine   [facility] - Intensive Care ([place])

## 2023-01-21 NOTE — PLAN OF CARE
01/21/23 0755   Post-Acute Status   Post-Acute Authorization Placement   Post-Acute Placement Status Pending payor review/awaiting authorization (if required)   Discharge Delays (!) Post-Acute Set-up   Discharge Plan   Discharge Plan A Long-term acute care facility (LTAC)     [facility] has denied patient as they are unable to care for his needs    [facility] has denied, as they do not have a medicaid bed available    SW sent referral to [facility]    Pt conditionally accepted to [facility]      Nika Umana CD, MSW, LMSW, RSW   Case Management  [facility]  Email: [email or web address]

## 2023-01-21 NOTE — SUBJECTIVE & OBJECTIVE
Interval History:   No events overnight. Denies fevers, chills, dyspnea, and headache. Patient with continue scrotal pain.      Review of Systems  Objective:     Vital Signs (Most Recent):  Temp: 99 °F (37.2 °C) (01/21/23 1117)  Pulse: 92 (01/21/23 1117)  Resp: 15 (01/21/23 1117)  BP: (!) 93/58 (01/21/23 1202)  SpO2: 96 % (01/21/23 1117)   Vital Signs (24h Range):  Temp:  [97.5 °F (36.4 °C)-99.9 °F (37.7 °C)] 99 °F (37.2 °C)  Pulse:  [] 92  Resp:  [15-19] 15  SpO2:  [94 %-99 %] 96 %  BP: ()/(48-97) 93/58     Weight: 59 kg (130 lb)  Body mass index is 21.63 kg/m².    Intake/Output Summary (Last 24 hours) at 1/21/2023 1331  Last data filed at 1/20/2023 1748  Gross per 24 hour   Intake 240 ml   Output 1165 ml   Net -925 ml      Physical Exam  Vitals and nursing note reviewed.   Constitutional:       General: He is not in acute distress.     Appearance: He is well-developed. He is not diaphoretic.      Comments: Chronically ill-appearing   HENT:      Head: Normocephalic and atraumatic.      Mouth/Throat:      Pharynx: No oropharyngeal exudate.   Eyes:      Extraocular Movements: Extraocular movements intact.      Conjunctiva/sclera: Conjunctivae normal.   Cardiovascular:      Rate and Rhythm: Normal rate and regular rhythm.      Heart sounds: Normal heart sounds.   Pulmonary:      Effort: Pulmonary effort is normal. No respiratory distress.      Breath sounds: Normal breath sounds. No wheezing.   Abdominal:      General: Bowel sounds are normal. There is no distension.      Palpations: Abdomen is soft.      Tenderness: There is no abdominal tenderness.   Genitourinary:     Comments: Previous open midline scrotal incision without surrounding necrosis or erythema. Now packed and clean.   Musculoskeletal:         General: No tenderness. Normal range of motion.      Cervical back: Normal range of motion and neck supple.   Lymphadenopathy:      Cervical: No cervical adenopathy.   Skin:     General: Skin is warm.       Capillary Refill: Capillary refill takes less than 2 seconds.      Findings: No rash.   Neurological:      Mental Status: He is alert and oriented to person, place, and time.      Cranial Nerves: No cranial nerve deficit.      Sensory: No sensory deficit.      Coordination: Coordination normal.   Psychiatric:         Behavior: Behavior normal.         Thought Content: Thought content normal.         Judgment: Judgment normal.       Significant Labs: CBC:   Recent Labs   Lab 01/20/23  0119 01/21/23  0451   WBC 6.98 11.39   HGB 10.7* 11.0*   HCT 34.0* 34.0*    267     CMP:   Recent Labs   Lab 01/20/23  0119 01/21/23  0451    131*   K 4.1 3.5    100   CO2 25 21*   GLU 82 134*   BUN 13 12   CREATININE 0.9 0.9   CALCIUM 9.4 9.0   PROT 7.0 6.9   ALBUMIN 3.3* 3.2*   BILITOT 0.3 0.5   ALKPHOS 101 91   AST 13 14   ALT 11 13   ANIONGAP 10 10       Significant Imaging: I have reviewed all pertinent imaging results/findings within the past 24 hours.

## 2023-01-22 PROBLEM — A41.9 SEPSIS: Status: ACTIVE | Noted: 2023-01-22

## 2023-01-22 PROBLEM — R65.10 SIRS (SYSTEMIC INFLAMMATORY RESPONSE SYNDROME): Status: ACTIVE | Noted: 2023-01-22

## 2023-01-22 LAB
ALBUMIN SERPL BCP-MCNC: 3 G/DL (ref 3.5–5.2)
ALP SERPL-CCNC: 93 U/L (ref 55–135)
ALT SERPL W/O P-5'-P-CCNC: 12 U/L (ref 10–44)
ANION GAP SERPL CALC-SCNC: 10 MMOL/L (ref 8–16)
AST SERPL-CCNC: 14 U/L (ref 10–40)
BASOPHILS # BLD AUTO: 0.02 K/UL (ref 0–0.2)
BASOPHILS NFR BLD: 0.2 % (ref 0–1.9)
BILIRUB SERPL-MCNC: 1.4 MG/DL (ref 0.1–1)
BUN SERPL-MCNC: 10 MG/DL (ref 6–20)
CALCIUM SERPL-MCNC: 9.3 MG/DL (ref 8.7–10.5)
CHLORIDE SERPL-SCNC: 103 MMOL/L (ref 95–110)
CO2 SERPL-SCNC: 22 MMOL/L (ref 23–29)
CREAT SERPL-MCNC: 0.8 MG/DL (ref 0.5–1.4)
DIFFERENTIAL METHOD: ABNORMAL
EOSINOPHIL # BLD AUTO: 0 K/UL (ref 0–0.5)
EOSINOPHIL NFR BLD: 0.1 % (ref 0–8)
ERYTHROCYTE [DISTWIDTH] IN BLOOD BY AUTOMATED COUNT: 16.1 % (ref 11.5–14.5)
EST. GFR  (NO RACE VARIABLE): >60 ML/MIN/1.73 M^2
GLUCOSE SERPL-MCNC: 119 MG/DL (ref 70–110)
HCT VFR BLD AUTO: 31.1 % (ref 40–54)
HGB BLD-MCNC: 10 G/DL (ref 14–18)
IMM GRANULOCYTES # BLD AUTO: 0.05 K/UL (ref 0–0.04)
IMM GRANULOCYTES NFR BLD AUTO: 0.5 % (ref 0–0.5)
LYMPHOCYTES # BLD AUTO: 0.6 K/UL (ref 1–4.8)
LYMPHOCYTES NFR BLD: 5.5 % (ref 18–48)
MCH RBC QN AUTO: 26 PG (ref 27–31)
MCHC RBC AUTO-ENTMCNC: 32.2 G/DL (ref 32–36)
MCV RBC AUTO: 81 FL (ref 82–98)
MONOCYTES # BLD AUTO: 1.1 K/UL (ref 0.3–1)
MONOCYTES NFR BLD: 10.1 % (ref 4–15)
NEUTROPHILS # BLD AUTO: 8.9 K/UL (ref 1.8–7.7)
NEUTROPHILS NFR BLD: 83.6 % (ref 38–73)
NRBC BLD-RTO: 0 /100 WBC
PLATELET # BLD AUTO: 190 K/UL (ref 150–450)
PMV BLD AUTO: 11.4 FL (ref 9.2–12.9)
POTASSIUM SERPL-SCNC: 3.2 MMOL/L (ref 3.5–5.1)
PROT SERPL-MCNC: 6.8 G/DL (ref 6–8.4)
RBC # BLD AUTO: 3.84 M/UL (ref 4.6–6.2)
SODIUM SERPL-SCNC: 135 MMOL/L (ref 136–145)
WBC # BLD AUTO: 10.58 K/UL (ref 3.9–12.7)

## 2023-01-22 PROCEDURE — 80053 COMPREHEN METABOLIC PANEL: CPT

## 2023-01-22 PROCEDURE — 63600175 PHARM REV CODE 636 W HCPCS: Performed by: STUDENT IN AN ORGANIZED HEALTH CARE EDUCATION/TRAINING PROGRAM

## 2023-01-22 PROCEDURE — 99232 SBSQ HOSP IP/OBS MODERATE 35: CPT | Mod: ,,, | Performed by: STUDENT IN AN ORGANIZED HEALTH CARE EDUCATION/TRAINING PROGRAM

## 2023-01-22 PROCEDURE — 25000003 PHARM REV CODE 250

## 2023-01-22 PROCEDURE — 36415 COLL VENOUS BLD VENIPUNCTURE: CPT

## 2023-01-22 PROCEDURE — G0378 HOSPITAL OBSERVATION PER HR: HCPCS

## 2023-01-22 PROCEDURE — 12000002 HC ACUTE/MED SURGE SEMI-PRIVATE ROOM

## 2023-01-22 PROCEDURE — 25000003 PHARM REV CODE 250: Performed by: STUDENT IN AN ORGANIZED HEALTH CARE EDUCATION/TRAINING PROGRAM

## 2023-01-22 PROCEDURE — 85025 COMPLETE CBC W/AUTO DIFF WBC: CPT

## 2023-01-22 PROCEDURE — 99232 PR SUBSEQUENT HOSPITAL CARE,LEVL II: ICD-10-PCS | Mod: ,,, | Performed by: STUDENT IN AN ORGANIZED HEALTH CARE EDUCATION/TRAINING PROGRAM

## 2023-01-22 PROCEDURE — S4991 NICOTINE PATCH NONLEGEND: HCPCS

## 2023-01-22 PROCEDURE — 63600175 PHARM REV CODE 636 W HCPCS

## 2023-01-22 RX ORDER — SODIUM CHLORIDE, SODIUM LACTATE, POTASSIUM CHLORIDE, CALCIUM CHLORIDE 600; 310; 30; 20 MG/100ML; MG/100ML; MG/100ML; MG/100ML
INJECTION, SOLUTION INTRAVENOUS CONTINUOUS
Status: DISCONTINUED | OUTPATIENT
Start: 2023-01-22 | End: 2023-01-23

## 2023-01-22 RX ORDER — OXYCODONE HYDROCHLORIDE 10 MG/1
10 TABLET ORAL EVERY 8 HOURS PRN
Status: DISCONTINUED | OUTPATIENT
Start: 2023-01-22 | End: 2023-01-27

## 2023-01-22 RX ORDER — OXYCODONE HYDROCHLORIDE 5 MG/1
5 TABLET ORAL EVERY 8 HOURS PRN
Status: DISCONTINUED | OUTPATIENT
Start: 2023-01-22 | End: 2023-01-27

## 2023-01-22 RX ADMIN — ACETAMINOPHEN 1000 MG: 500 TABLET ORAL at 09:01

## 2023-01-22 RX ADMIN — KETOROLAC TROMETHAMINE 15 MG: 30 INJECTION, SOLUTION INTRAMUSCULAR; INTRAVENOUS at 09:01

## 2023-01-22 RX ADMIN — ACETAMINOPHEN 1000 MG: 500 TABLET ORAL at 01:01

## 2023-01-22 RX ADMIN — OXYCODONE HYDROCHLORIDE 10 MG: 10 TABLET ORAL at 06:01

## 2023-01-22 RX ADMIN — SULFAMETHOXAZOLE AND TRIMETHOPRIM 1 TABLET: 800; 160 TABLET ORAL at 10:01

## 2023-01-22 RX ADMIN — NICOTINE 1 PATCH: 14 PATCH, EXTENDED RELEASE TRANSDERMAL at 10:01

## 2023-01-22 RX ADMIN — OXYCODONE 5 MG: 5 TABLET ORAL at 01:01

## 2023-01-22 RX ADMIN — ACETAMINOPHEN 1000 MG: 500 TABLET ORAL at 06:01

## 2023-01-22 RX ADMIN — SULFAMETHOXAZOLE AND TRIMETHOPRIM 1 TABLET: 800; 160 TABLET ORAL at 09:01

## 2023-01-22 RX ADMIN — OXYCODONE HYDROCHLORIDE 5 MG: 5 TABLET ORAL at 10:01

## 2023-01-22 RX ADMIN — SODIUM CHLORIDE, POTASSIUM CHLORIDE, SODIUM LACTATE AND CALCIUM CHLORIDE: 600; 310; 30; 20 INJECTION, SOLUTION INTRAVENOUS at 05:01

## 2023-01-22 NOTE — PROGRESS NOTES
"[facility] ([address] Medicine  Progress Note    Patient Name: Endy Bruno  MRN: [id number]  Patient Class: OP- Observation   Admission Date: 1/20/2023  Length of Stay: 0 days  Attending Physician: Dominic Lincoln MD  Primary Care Provider: Primary Doctor No        Subjective:     Principal Problem:Testicular abscess        HPI:  Endy Bruno is a 47 y.o. 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 Patient presented to the emergency department due to concern for wound infection. Per chart review, patient had been d/c'ed following his orchiectomy to an LTAC for IV abx and wound care for his open scrotal wound. He reportedly left AMA because he was not allowed visitors on 1/13/22. He has not since received abx nor wound care and is self reported to be "living on the streets." He presents today after noticing that his wound was "leaking pus" and for worsening pain. Reports most recent IVDU prior to arrival. He endorses chills and nausea. Denies chest pain, shortness of breath, urinary changes, diarrhea, or vomiting.    ED: mildly hypotensive at 95/53 and tachycardic up to 102 bpm. Low grade temp of 99.9. WBC of 6.98K, Hb 10.7. CMP within normal limitis. UA negative. Wound cultures obtained. Scrotal US with evidence of interval postoperative change of right orchiectomy in this patient with reported history of torsion.  There is a 0.8 cm anechoic focus in the testicular fossa resection bed with adjacent soft tissue thickening/edema and hyperemia noting heterogeneous echogenicity at the reported incision site.  Findings could relate to underlying soft tissue infection/cellulitis in the appropriate clinical setting there is postoperative change with associated postoperative fluid/fluid collection noting sterility of fluid cannot be assessed on " ultrasound.  Correlation with physical exam and appropriate subspecialty consultation advised. Urology consulted and packed the wound at the bedside with iodoform gauze following washout with iodine and NS. There were no areas requiring debridement. Lower extremity US without DVT. Given IV ciprofloxacin, toradol, and a nicotine patch.      Overview/Hospital Course:  No notes on file    Interval History:   Patient given 1 L bolus yesterday for persistent hypotension. Patient reports episode of nausea with emesis yesterday afternoon.      This morning he continues to report nausea resulting in poor oral intake.  BP noted to be 86/58 and patient started on maintenance IV fluids.  Reports fevers and continued groin pain.      Review of Systems   Constitutional:  Positive for appetite change. Negative for activity change, chills, diaphoresis, fatigue and fever.   HENT:  Negative for rhinorrhea and sore throat.    Respiratory:  Negative for cough, chest tightness and shortness of breath.    Cardiovascular:  Negative for chest pain and palpitations.   Gastrointestinal:  Positive for nausea and vomiting. Negative for abdominal pain, constipation and diarrhea.   Endocrine: Negative for cold intolerance.   Genitourinary:  Positive for testicular pain. Negative for decreased urine volume and dysuria.   Musculoskeletal:  Negative for arthralgias and myalgias.   Skin:  Negative for rash and wound.   Neurological:  Negative for dizziness, weakness, numbness and headaches.   Psychiatric/Behavioral:  Negative for agitation, behavioral problems and confusion.    Objective:     Vital Signs (Most Recent):  Temp: 97 °F (36.1 °C) (01/22/23 1141)  Pulse: 94 (01/22/23 1141)  Resp: 17 (01/22/23 1353)  BP: (!) 86/58 (01/22/23 1141)  SpO2: (!) 89 % (01/22/23 1141)   Vital Signs (24h Range):  Temp:  [96.3 °F (35.7 °C)-98 °F (36.7 °C)] 97 °F (36.1 °C)  Pulse:  [52-97] 94  Resp:  [17-19] 17  SpO2:  [89 %-96 %] 89 %  BP: ()/(54-59) 86/58      Weight: 59 kg (130 lb)  Body mass index is 21.63 kg/m².    Intake/Output Summary (Last 24 hours) at 1/22/2023 1639  Last data filed at 1/21/2023 1755  Gross per 24 hour   Intake 240 ml   Output --   Net 240 ml      Physical Exam  Vitals and nursing note reviewed.   Constitutional:       General: He is not in acute distress.     Appearance: He is well-developed. He is not diaphoretic.      Comments: Chronically ill-appearing   HENT:      Head: Normocephalic and atraumatic.      Mouth/Throat:      Pharynx: No oropharyngeal exudate.   Eyes:      Extraocular Movements: Extraocular movements intact.      Conjunctiva/sclera: Conjunctivae normal.   Cardiovascular:      Rate and Rhythm: Regular rhythm. Tachycardia present.      Heart sounds: Normal heart sounds.   Pulmonary:      Effort: Pulmonary effort is normal. No respiratory distress.      Breath sounds: Normal breath sounds. No wheezing.   Abdominal:      General: Bowel sounds are normal. There is no distension.      Palpations: Abdomen is soft.      Tenderness: There is no abdominal tenderness.   Genitourinary:     Comments: Previous open midline scrotal incision without surrounding necrosis or erythema. Now packed and clean.   Musculoskeletal:         General: No tenderness. Normal range of motion.      Cervical back: Normal range of motion and neck supple.   Lymphadenopathy:      Cervical: No cervical adenopathy.   Skin:     General: Skin is warm.      Findings: No rash.   Neurological:      Mental Status: He is alert and oriented to person, place, and time.      Cranial Nerves: No cranial nerve deficit.      Sensory: No sensory deficit.      Coordination: Coordination normal.   Psychiatric:         Behavior: Behavior normal.         Thought Content: Thought content normal.         Judgment: Judgment normal.       Significant Labs: CBC:   Recent Labs   Lab 01/21/23  0451 01/22/23  0401   WBC 11.39 10.58   HGB 11.0* 10.0*   HCT 34.0* 31.1*    190     CMP:    Recent Labs   Lab 01/21/23  0451 01/22/23  0401   * 135*   K 3.5 3.2*    103   CO2 21* 22*   * 119*   BUN 12 10   CREATININE 0.9 0.8   CALCIUM 9.0 9.3   PROT 6.9 6.8   ALBUMIN 3.2* 3.0*   BILITOT 0.5 1.4*   ALKPHOS 91 93   AST 14 14   ALT 13 12   ANIONGAP 10 10       Significant Imaging: I have reviewed all pertinent imaging results/findings within the past 24 hours.      Assessment/Plan:      * Testicular abscess  Surgical wound present     - s/p right orchiectomy and right hemiscrotal abscess I&D   - Mildly hypotensive and low grade fever in the ED  - s/p 500 cc IVF   - US of scrotum and testicles with evidence of interval postoperative change of right orchiectomy in this patient with reported history of torsion.  There is a 0.8 cm anechoic focus in the testicular fossa resection bed with adjacent soft tissue thickening/edema and hyperemia noting heterogeneous echogenicity at the reported incision site.  Findings could relate to underlying soft tissue infection/cellulitis in the appropriate clinical setting there is postoperative change with associated postoperative fluid/fluid collection noting sterility of fluid cannot be assessed on ultrasound.   - Urology consulted in the ED who cleaned and packed wound  - Recommend continuing abx with Bactrim DS for 7 - 10 days and return to LTAC  - Appreciate SW/CM assistance with placement  - Will need urology follow-up  - prn analgesics    SIRS (systemic inflammatory response syndrome)  This patient does have evidence of infective focus  My overall impression is sepsis. Vital signs were reviewed and noted in progress note.  Antibiotics given-   Antibiotics (From admission, onward)    Start     Stop Route Frequency Ordered    01/21/23 0915  sulfamethoxazole-trimethoprim 800-160mg per tablet 1 tablet         -- Oral 2 times daily 01/21/23 0804        Cultures were taken-   Microbiology Results (last 7 days)     Procedure Component Value Units Date/Time     Culture, Anaerobe [[id number]] Collected: 01/20/23 0547    Order Status: Completed Specimen: Abscess from Groin Updated: 01/21/23 1409     Anaerobic Culture Culture in progress    Aerobic culture [[id number]] Collected: 01/20/23 0547    Order Status: Completed Specimen: Abscess from Groin Updated: 01/21/23 0752     Aerobic Bacterial Culture No significant isolate to date    Aerobic culture (Specify Source) **CANNOT BE ORDERED AS STAT* [[id number]] Collected: 01/20/23 0550    Order Status: Sent Specimen: Wound from Sacrum Updated: 01/20/23 0551        Latest lactate reviewed, they are-  No results for input(s): LACTATE in the last 72 hours.    Organ dysfunction indicated by   Source - Testicular abscess    Source control Achieved by- drainage of pus from wound, wound care    - Continue antibiotics as above  - Patient with persistent hypotension and poor oral intake   - Maintenance IV fluids    Surgical wound present  - See above    Opioid use disorder, severe, dependence  - Reports last heroin use prior to arrival  - PRNn ativan for withdrawal symptoms  - PRN anti-emetics  - No s/s of withdrawal noted      Anemia of infection and chronic disease  - patient's anemia is currently controlled.  - current CBC reviewed -   Lab Results   Component Value Date    HGB 10.0 (L) 01/22/2023    HCT 31.1 (L) 01/22/2023     - Monitor serial CBC and transfuse if patient becomes hemodynamically unstable, symptomatic, or H/H drops below 7/21.         Dependence on nicotine from cigarettes  - Nicotine patch        VTE Risk Mitigation (From admission, onward)         Ordered     IP VTE HIGH RISK PATIENT  Once         01/20/23 1550     Reason for No Pharmacological VTE Prophylaxis  Once        Question:  Reasons:  Answer:  Risk of Bleeding    01/20/23 1550                Discharge Planning   CEDRIC:      Code Status: Full Code   Is the patient medically ready for discharge?: No    Reason for patient still in hospital (select all that apply):  Patient trending condition, Laboratory test, Treatment and Pending disposition  Discharge Plan A: Long-term acute care facility (LTAC)   Discharge Delays: (!) Post-Acute Set-up              Dominic Lincoln MD  Department of Hospital Medicine   [facility] - Intensive Care ([place]

## 2023-01-22 NOTE — ASSESSMENT & PLAN NOTE
This patient does have evidence of infective focus  My overall impression is sepsis. Vital signs were reviewed and noted in progress note.  Antibiotics given-   Antibiotics (From admission, onward)    Start     Stop Route Frequency Ordered    01/21/23 0915  sulfamethoxazole-trimethoprim 800-160mg per tablet 1 tablet         -- Oral 2 times daily 01/21/23 0804        Cultures were taken-   Microbiology Results (last 7 days)     Procedure Component Value Units Date/Time    Culture, Anaerobe [[id number]] Collected: 01/20/23 0547    Order Status: Completed Specimen: Abscess from Groin Updated: 01/21/23 1409     Anaerobic Culture Culture in progress    Aerobic culture [[id number]] Collected: 01/20/23 0547    Order Status: Completed Specimen: Abscess from Groin Updated: 01/21/23 0752     Aerobic Bacterial Culture No significant isolate to date    Aerobic culture (Specify Source) **CANNOT BE ORDERED AS STAT* [[id number]] Collected: 01/20/23 0550    Order Status: Sent Specimen: Wound from Sacrum Updated: 01/20/23 0551        Latest lactate reviewed, they are-  No results for input(s): LACTATE in the last 72 hours.    Organ dysfunction indicated by   Source - Testicular abscess    Source control Achieved by- drainage of pus from wound, wound care    - Continue antibiotics as above  - Patient with persistent hypotension and poor oral intake   - Maintenance IV fluids

## 2023-01-22 NOTE — ASSESSMENT & PLAN NOTE
- patient's anemia is currently controlled.  - current CBC reviewed -   Lab Results   Component Value Date    HGB 10.0 (L) 01/22/2023    HCT 31.1 (L) 01/22/2023     - Monitor serial CBC and transfuse if patient becomes hemodynamically unstable, symptomatic, or H/H drops below 7/21.

## 2023-01-22 NOTE — ASSESSMENT & PLAN NOTE
- Reports last heroin use prior to arrival  - PRNn ativan for withdrawal symptoms  - PRN anti-emetics  - No s/s of withdrawal noted

## 2023-01-22 NOTE — SUBJECTIVE & OBJECTIVE
Interval History:   Patient given 1 L bolus yesterday for persistent hypotension. Patient reports episode of nausea with emesis yesterday afternoon.      This morning he continues to report nausea resulting in poor oral intake.  BP noted to be 86/58 and patient started on maintenance IV fluids.  Reports fevers and continued groin pain.      Review of Systems   Constitutional:  Positive for appetite change. Negative for activity change, chills, diaphoresis, fatigue and fever.   HENT:  Negative for rhinorrhea and sore throat.    Respiratory:  Negative for cough, chest tightness and shortness of breath.    Cardiovascular:  Negative for chest pain and palpitations.   Gastrointestinal:  Positive for nausea and vomiting. Negative for abdominal pain, constipation and diarrhea.   Endocrine: Negative for cold intolerance.   Genitourinary:  Positive for testicular pain. Negative for decreased urine volume and dysuria.   Musculoskeletal:  Negative for arthralgias and myalgias.   Skin:  Negative for rash and wound.   Neurological:  Negative for dizziness, weakness, numbness and headaches.   Psychiatric/Behavioral:  Negative for agitation, behavioral problems and confusion.    Objective:     Vital Signs (Most Recent):  Temp: 97 °F (36.1 °C) (01/22/23 1141)  Pulse: 94 (01/22/23 1141)  Resp: 17 (01/22/23 1353)  BP: (!) 86/58 (01/22/23 1141)  SpO2: (!) 89 % (01/22/23 1141)   Vital Signs (24h Range):  Temp:  [96.3 °F (35.7 °C)-98 °F (36.7 °C)] 97 °F (36.1 °C)  Pulse:  [52-97] 94  Resp:  [17-19] 17  SpO2:  [89 %-96 %] 89 %  BP: ()/(54-59) 86/58     Weight: 59 kg (130 lb)  Body mass index is 21.63 kg/m².    Intake/Output Summary (Last 24 hours) at 1/22/2023 1639  Last data filed at 1/21/2023 1755  Gross per 24 hour   Intake 240 ml   Output --   Net 240 ml      Physical Exam  Vitals and nursing note reviewed.   Constitutional:       General: He is not in acute distress.     Appearance: He is well-developed. He is not diaphoretic.       Comments: Chronically ill-appearing   HENT:      Head: Normocephalic and atraumatic.      Mouth/Throat:      Pharynx: No oropharyngeal exudate.   Eyes:      Extraocular Movements: Extraocular movements intact.      Conjunctiva/sclera: Conjunctivae normal.   Cardiovascular:      Rate and Rhythm: Regular rhythm. Tachycardia present.      Heart sounds: Normal heart sounds.   Pulmonary:      Effort: Pulmonary effort is normal. No respiratory distress.      Breath sounds: Normal breath sounds. No wheezing.   Abdominal:      General: Bowel sounds are normal. There is no distension.      Palpations: Abdomen is soft.      Tenderness: There is no abdominal tenderness.   Genitourinary:     Comments: Previous open midline scrotal incision without surrounding necrosis or erythema. Now packed and clean.   Musculoskeletal:         General: No tenderness. Normal range of motion.      Cervical back: Normal range of motion and neck supple.   Lymphadenopathy:      Cervical: No cervical adenopathy.   Skin:     General: Skin is warm.      Findings: No rash.   Neurological:      Mental Status: He is alert and oriented to person, place, and time.      Cranial Nerves: No cranial nerve deficit.      Sensory: No sensory deficit.      Coordination: Coordination normal.   Psychiatric:         Behavior: Behavior normal.         Thought Content: Thought content normal.         Judgment: Judgment normal.       Significant Labs: CBC:   Recent Labs   Lab 01/21/23  0451 01/22/23  0401   WBC 11.39 10.58   HGB 11.0* 10.0*   HCT 34.0* 31.1*    190     CMP:   Recent Labs   Lab 01/21/23  0451 01/22/23  0401   * 135*   K 3.5 3.2*    103   CO2 21* 22*   * 119*   BUN 12 10   CREATININE 0.9 0.8   CALCIUM 9.0 9.3   PROT 6.9 6.8   ALBUMIN 3.2* 3.0*   BILITOT 0.5 1.4*   ALKPHOS 91 93   AST 14 14   ALT 13 12   ANIONGAP 10 10       Significant Imaging: I have reviewed all pertinent imaging results/findings within the past 24  hours.

## 2023-01-23 LAB — BACTERIA SPEC AEROBE CULT: NORMAL

## 2023-01-23 PROCEDURE — G0378 HOSPITAL OBSERVATION PER HR: HCPCS

## 2023-01-23 PROCEDURE — 99232 SBSQ HOSP IP/OBS MODERATE 35: CPT | Mod: ,,, | Performed by: STUDENT IN AN ORGANIZED HEALTH CARE EDUCATION/TRAINING PROGRAM

## 2023-01-23 PROCEDURE — 63600175 PHARM REV CODE 636 W HCPCS

## 2023-01-23 PROCEDURE — 25000003 PHARM REV CODE 250

## 2023-01-23 PROCEDURE — S4991 NICOTINE PATCH NONLEGEND: HCPCS

## 2023-01-23 PROCEDURE — 99232 PR SUBSEQUENT HOSPITAL CARE,LEVL II: ICD-10-PCS | Mod: ,,, | Performed by: STUDENT IN AN ORGANIZED HEALTH CARE EDUCATION/TRAINING PROGRAM

## 2023-01-23 PROCEDURE — 94761 N-INVAS EAR/PLS OXIMETRY MLT: CPT

## 2023-01-23 PROCEDURE — 12000002 HC ACUTE/MED SURGE SEMI-PRIVATE ROOM

## 2023-01-23 PROCEDURE — 25000003 PHARM REV CODE 250: Performed by: STUDENT IN AN ORGANIZED HEALTH CARE EDUCATION/TRAINING PROGRAM

## 2023-01-23 RX ADMIN — ACETAMINOPHEN 1000 MG: 500 TABLET ORAL at 10:01

## 2023-01-23 RX ADMIN — NICOTINE 1 PATCH: 14 PATCH, EXTENDED RELEASE TRANSDERMAL at 08:01

## 2023-01-23 RX ADMIN — SULFAMETHOXAZOLE AND TRIMETHOPRIM 1 TABLET: 800; 160 TABLET ORAL at 08:01

## 2023-01-23 RX ADMIN — KETOROLAC TROMETHAMINE 15 MG: 30 INJECTION, SOLUTION INTRAMUSCULAR; INTRAVENOUS at 08:01

## 2023-01-23 RX ADMIN — ACETAMINOPHEN 1000 MG: 500 TABLET ORAL at 05:01

## 2023-01-23 RX ADMIN — OXYCODONE HYDROCHLORIDE 10 MG: 10 TABLET ORAL at 02:01

## 2023-01-23 RX ADMIN — OXYCODONE HYDROCHLORIDE 10 MG: 10 TABLET ORAL at 05:01

## 2023-01-23 RX ADMIN — SULFAMETHOXAZOLE AND TRIMETHOPRIM 1 TABLET: 800; 160 TABLET ORAL at 10:01

## 2023-01-23 NOTE — NURSING
During am assessment, patient threatened to leave AMA due to not being able to get pain rx as he would like to. I convinced him to stay. He asked to take a walk downstairs.     ED notified CM, who then notified me that the patient was seen outside.    MD aware that the patient left the floor. Patient left the floor around 0900 and has yet to return. Will attempt to overhead page and call the number listed on file.     Patient overhead paged at 0935 by    Called [phone] at 0937, spoke with patient and let him know he needs to come back to his room, per patient he is on his way up

## 2023-01-23 NOTE — PROGRESS NOTES
"[facility] ([address] Medicine  Progress Note    Patient Name: Endy Bruno  MRN: [id number]  Patient Class: OP- Observation   Admission Date: 1/20/2023  Length of Stay: 0 days  Attending Physician: Dominic Lincoln MD  Primary Care Provider: Primary Doctor No        Subjective:     Principal Problem:Testicular abscess        HPI:  Endy Bruno is a 47 y.o. 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 Patient presented to the emergency department due to concern for wound infection. Per chart review, patient had been d/c'ed following his orchiectomy to an LTAC for IV abx and wound care for his open scrotal wound. He reportedly left AMA because he was not allowed visitors on 1/13/22. He has not since received abx nor wound care and is self reported to be "living on the streets." He presents today after noticing that his wound was "leaking pus" and for worsening pain. Reports most recent IVDU prior to arrival. He endorses chills and nausea. Denies chest pain, shortness of breath, urinary changes, diarrhea, or vomiting.    ED: mildly hypotensive at 95/53 and tachycardic up to 102 bpm. Low grade temp of 99.9. WBC of 6.98K, Hb 10.7. CMP within normal limitis. UA negative. Wound cultures obtained. Scrotal US with evidence of interval postoperative change of right orchiectomy in this patient with reported history of torsion.  There is a 0.8 cm anechoic focus in the testicular fossa resection bed with adjacent soft tissue thickening/edema and hyperemia noting heterogeneous echogenicity at the reported incision site.  Findings could relate to underlying soft tissue infection/cellulitis in the appropriate clinical setting there is postoperative change with associated postoperative fluid/fluid collection noting sterility of fluid cannot be assessed on " ultrasound.  Correlation with physical exam and appropriate subspecialty consultation advised. Urology consulted and packed the wound at the bedside with iodoform gauze following washout with iodine and NS. There were no areas requiring debridement. Lower extremity US without DVT. Given IV ciprofloxacin, toradol, and a nicotine patch.      Overview/Hospital Course:  No notes on file    Interval History:   No events overnight. This morning patient was off floor for extended period of time and was nearly discharged a AMA/eloped. Patient returned to floor and apologized for being away for so long as he was getting food/groceries. Given IVDU hx, patient room searched by security. Patient understood he would be discharged AMA if a similar event happens again. UDS ordered.       Review of Systems   Constitutional:  Negative for activity change, appetite change, chills, diaphoresis, fatigue and fever.   HENT:  Negative for rhinorrhea and sore throat.    Respiratory:  Negative for cough, chest tightness and shortness of breath.    Cardiovascular:  Negative for chest pain and palpitations.   Gastrointestinal:  Negative for abdominal pain, constipation, diarrhea, nausea and vomiting.   Endocrine: Negative for cold intolerance.   Genitourinary:  Positive for testicular pain. Negative for decreased urine volume and dysuria.   Musculoskeletal:  Negative for arthralgias and myalgias.   Skin:  Negative for rash and wound.   Neurological:  Negative for dizziness, weakness, numbness and headaches.   Psychiatric/Behavioral:  Negative for agitation, behavioral problems and confusion.    Objective:     Vital Signs (Most Recent):  Temp: 98.5 °F (36.9 °C) (01/23/23 1151)  Pulse: 101 (01/23/23 1151)  Resp: 18 (01/23/23 1151)  BP: 105/63 (01/23/23 1151)  SpO2: (!) 93 % (01/23/23 1151)   Vital Signs (24h Range):  Temp:  [96 °F (35.6 °C)-98.5 °F (36.9 °C)] 98.5 °F (36.9 °C)  Pulse:  [] 101  Resp:  [16-20] 18  SpO2:  [93 %-98 %] 93  %  BP: (105-137)/(59-81) 105/63     Weight: 59 kg (129 lb 15.7 oz)  Body mass index is 21.63 kg/m².    Intake/Output Summary (Last 24 hours) at 1/23/2023 1501  Last data filed at 1/23/2023 0800  Gross per 24 hour   Intake 2232.05 ml   Output 550 ml   Net 1682.05 ml      Physical Exam  Vitals and nursing note reviewed.   Constitutional:       General: He is not in acute distress.     Appearance: He is well-developed. He is not diaphoretic.      Comments: Chronically ill-appearing   HENT:      Head: Normocephalic and atraumatic.      Mouth/Throat:      Pharynx: No oropharyngeal exudate.   Eyes:      Extraocular Movements: Extraocular movements intact.      Conjunctiva/sclera: Conjunctivae normal.   Cardiovascular:      Rate and Rhythm: Regular rhythm. Tachycardia present.      Heart sounds: Normal heart sounds.   Pulmonary:      Effort: Pulmonary effort is normal. No respiratory distress.      Breath sounds: Normal breath sounds. No wheezing.   Abdominal:      General: Bowel sounds are normal. There is no distension.      Palpations: Abdomen is soft.      Tenderness: There is no abdominal tenderness.   Genitourinary:     Comments: Previous open midline scrotal incision without surrounding necrosis or erythema. Now packed and clean.   Musculoskeletal:         General: No tenderness. Normal range of motion.      Cervical back: Normal range of motion and neck supple.   Lymphadenopathy:      Cervical: No cervical adenopathy.   Skin:     General: Skin is warm.      Findings: No rash.   Neurological:      Mental Status: He is alert and oriented to person, place, and time.      Cranial Nerves: No cranial nerve deficit.      Sensory: No sensory deficit.      Coordination: Coordination normal.   Psychiatric:         Behavior: Behavior normal.         Thought Content: Thought content normal.         Judgment: Judgment normal.       Significant Labs: CBC:   Recent Labs   Lab 01/22/23  0401   WBC 10.58   HGB 10.0*   HCT 31.1*         CMP:   Recent Labs   Lab 01/22/23  0401   *   K 3.2*      CO2 22*   *   BUN 10   CREATININE 0.8   CALCIUM 9.3   PROT 6.8   ALBUMIN 3.0*   BILITOT 1.4*   ALKPHOS 93   AST 14   ALT 12   ANIONGAP 10       Significant Imaging: I have reviewed all pertinent imaging results/findings within the past 24 hours.      Assessment/Plan:      * Testicular abscess  Surgical wound present     - s/p right orchiectomy and right hemiscrotal abscess I&D   - Mildly hypotensive and low grade fever in the ED  - s/p 500 cc IVF   - US of scrotum and testicles with evidence of interval postoperative change of right orchiectomy in this patient with reported history of torsion.  There is a 0.8 cm anechoic focus in the testicular fossa resection bed with adjacent soft tissue thickening/edema and hyperemia noting heterogeneous echogenicity at the reported incision site.  Findings could relate to underlying soft tissue infection/cellulitis in the appropriate clinical setting there is postoperative change with associated postoperative fluid/fluid collection noting sterility of fluid cannot be assessed on ultrasound.   - Urology consulted in the ED who cleaned and packed wound  - Recommend continuing abx with Bactrim DS for 7 - 10 days and return to LTAC  - Appreciate SW/CM assistance with placement  - Will need urology follow-up  - prn analgesics    SIRS (systemic inflammatory response syndrome)  This patient does have evidence of infective focus  My overall impression is sepsis. Vital signs were reviewed and noted in progress note.  Antibiotics given-   Antibiotics (From admission, onward)    Start     Stop Route Frequency Ordered    01/21/23 0915  sulfamethoxazole-trimethoprim 800-160mg per tablet 1 tablet         -- Oral 2 times daily 01/21/23 0804        Cultures were taken-   Microbiology Results (last 7 days)     Procedure Component Value Units Date/Time    Aerobic culture [[id number]] Collected: 01/20/23 7634     Order Status: Completed Specimen: Abscess from Groin Updated: 01/23/23 1051     Aerobic Bacterial Culture Genital sadaf, no predominant organism    Culture, Anaerobe [[id number]] Collected: 01/20/23 0547    Order Status: Completed Specimen: Abscess from Groin Updated: 01/21/23 1409     Anaerobic Culture Culture in progress    Aerobic culture (Specify Source) **CANNOT BE ORDERED AS STAT* [[id number]] Collected: 01/20/23 0550    Order Status: Sent Specimen: Wound from Sacrum Updated: 01/20/23 0551        Latest lactate reviewed, they are-  No results for input(s): LACTATE in the last 72 hours.    Organ dysfunction indicated by   Source - Testicular abscess    Source control Achieved by- drainage of pus from wound, wound care    - Continue antibiotics as above  - Patient with persistent hypotension and poor oral intake   - Maintenance IV fluids    Surgical wound present  - See above    Opioid use disorder, severe, dependence  - Reports last heroin use prior to arrival  - PRNn ativan for withdrawal symptoms  - PRN anti-emetics  - No s/s of withdrawal noted      Anemia of infection and chronic disease  - patient's anemia is currently controlled.  - current CBC reviewed -   Lab Results   Component Value Date    HGB 10.0 (L) 01/22/2023    HCT 31.1 (L) 01/22/2023     - Monitor serial CBC and transfuse if patient becomes hemodynamically unstable, symptomatic, or H/H drops below 7/21.         Dependence on nicotine from cigarettes  - Nicotine patch        VTE Risk Mitigation (From admission, onward)         Ordered     IP VTE HIGH RISK PATIENT  Once         01/20/23 1550     Reason for No Pharmacological VTE Prophylaxis  Once        Question:  Reasons:  Answer:  Risk of Bleeding    01/20/23 1550                Discharge Planning   CEDRIC: 1/23/2023     Code Status: Full Code   Is the patient medically ready for discharge?: No    Reason for patient still in hospital (select all that apply): Patient trending condition, Laboratory  test, Treatment and Pending disposition  Discharge Plan A: Long-term acute care facility (LTAC)   Discharge Delays: (!) Post-Acute Set-up              Dominic Lincoln MD  Department of Hospital Medicine   [facility] - Intensive Care ([place])

## 2023-01-23 NOTE — SUBJECTIVE & OBJECTIVE
Interval History:   No events overnight. This morning patient was off floor for extended period of time and was nearly discharged a AMA/eloped. Patient returned to floor and apologized for being away for so long as he was getting food/groceries. Given IVDU hx, patient room searched by security. Patient understood he would be discharged AMA if a similar event happens again. UDS ordered.       Review of Systems   Constitutional:  Negative for activity change, appetite change, chills, diaphoresis, fatigue and fever.   HENT:  Negative for rhinorrhea and sore throat.    Respiratory:  Negative for cough, chest tightness and shortness of breath.    Cardiovascular:  Negative for chest pain and palpitations.   Gastrointestinal:  Negative for abdominal pain, constipation, diarrhea, nausea and vomiting.   Endocrine: Negative for cold intolerance.   Genitourinary:  Positive for testicular pain. Negative for decreased urine volume and dysuria.   Musculoskeletal:  Negative for arthralgias and myalgias.   Skin:  Negative for rash and wound.   Neurological:  Negative for dizziness, weakness, numbness and headaches.   Psychiatric/Behavioral:  Negative for agitation, behavioral problems and confusion.    Objective:     Vital Signs (Most Recent):  Temp: 98.5 °F (36.9 °C) (01/23/23 1151)  Pulse: 101 (01/23/23 1151)  Resp: 18 (01/23/23 1151)  BP: 105/63 (01/23/23 1151)  SpO2: (!) 93 % (01/23/23 1151)   Vital Signs (24h Range):  Temp:  [96 °F (35.6 °C)-98.5 °F (36.9 °C)] 98.5 °F (36.9 °C)  Pulse:  [] 101  Resp:  [16-20] 18  SpO2:  [93 %-98 %] 93 %  BP: (105-137)/(59-81) 105/63     Weight: 59 kg (129 lb 15.7 oz)  Body mass index is 21.63 kg/m².    Intake/Output Summary (Last 24 hours) at 1/23/2023 1501  Last data filed at 1/23/2023 0800  Gross per 24 hour   Intake 2232.05 ml   Output 550 ml   Net 1682.05 ml      Physical Exam  Vitals and nursing note reviewed.   Constitutional:       General: He is not in acute distress.      Appearance: He is well-developed. He is not diaphoretic.      Comments: Chronically ill-appearing   HENT:      Head: Normocephalic and atraumatic.      Mouth/Throat:      Pharynx: No oropharyngeal exudate.   Eyes:      Extraocular Movements: Extraocular movements intact.      Conjunctiva/sclera: Conjunctivae normal.   Cardiovascular:      Rate and Rhythm: Regular rhythm. Tachycardia present.      Heart sounds: Normal heart sounds.   Pulmonary:      Effort: Pulmonary effort is normal. No respiratory distress.      Breath sounds: Normal breath sounds. No wheezing.   Abdominal:      General: Bowel sounds are normal. There is no distension.      Palpations: Abdomen is soft.      Tenderness: There is no abdominal tenderness.   Genitourinary:     Comments: Previous open midline scrotal incision without surrounding necrosis or erythema. Now packed and clean.   Musculoskeletal:         General: No tenderness. Normal range of motion.      Cervical back: Normal range of motion and neck supple.   Lymphadenopathy:      Cervical: No cervical adenopathy.   Skin:     General: Skin is warm.      Findings: No rash.   Neurological:      Mental Status: He is alert and oriented to person, place, and time.      Cranial Nerves: No cranial nerve deficit.      Sensory: No sensory deficit.      Coordination: Coordination normal.   Psychiatric:         Behavior: Behavior normal.         Thought Content: Thought content normal.         Judgment: Judgment normal.       Significant Labs: CBC:   Recent Labs   Lab 01/22/23  0401   WBC 10.58   HGB 10.0*   HCT 31.1*        CMP:   Recent Labs   Lab 01/22/23  0401   *   K 3.2*      CO2 22*   *   BUN 10   CREATININE 0.8   CALCIUM 9.3   PROT 6.8   ALBUMIN 3.0*   BILITOT 1.4*   ALKPHOS 93   AST 14   ALT 12   ANIONGAP 10       Significant Imaging: I have reviewed all pertinent imaging results/findings within the past 24 hours.

## 2023-01-23 NOTE — CARE UPDATE
Notified by nursing that patient threatening to leave AMA this morning and made references to ease of obtaining heroin.  Prior to rounding, I was informed that patient was not in his room.  Upon rounding, patient was still not in his room, and nursing notified.  Nursing attempted to page patient overhead to return to room, however he did not return.  Given extensive duration outpatient being off floor, he was discharged AMA.

## 2023-01-23 NOTE — PLAN OF CARE
Problem: Adult Inpatient Plan of Care  Goal: Plan of Care Review  Outcome: Ongoing, Progressing  Goal: Patient-Specific Goal (Individualized)  Outcome: Ongoing, Progressing    Pt AAO X 4; able to express needs.  C/o pain .  Meds given as ordered, but pain remains unrelieved.  Refusing Tylenol and Ibuprofen.  IVF's disconnected by patient.  Will let nurse reconnect at breakfast.  Independent.  Ambulating in the halls.  Safety maintained.  Bed in low position,  call  light in reach.

## 2023-01-23 NOTE — ASSESSMENT & PLAN NOTE
This patient does have evidence of infective focus  My overall impression is sepsis. Vital signs were reviewed and noted in progress note.  Antibiotics given-   Antibiotics (From admission, onward)    Start     Stop Route Frequency Ordered    01/21/23 0915  sulfamethoxazole-trimethoprim 800-160mg per tablet 1 tablet         -- Oral 2 times daily 01/21/23 0804        Cultures were taken-   Microbiology Results (last 7 days)     Procedure Component Value Units Date/Time    Aerobic culture [[id number]] Collected: 01/20/23 0547    Order Status: Completed Specimen: Abscess from Groin Updated: 01/23/23 1051     Aerobic Bacterial Culture Genital sadaf, no predominant organism    Culture, Anaerobe [[id number]] Collected: 01/20/23 0547    Order Status: Completed Specimen: Abscess from Groin Updated: 01/21/23 1409     Anaerobic Culture Culture in progress    Aerobic culture (Specify Source) **CANNOT BE ORDERED AS STAT* [[id number]] Collected: 01/20/23 0550    Order Status: Sent Specimen: Wound from Sacrum Updated: 01/20/23 0551        Latest lactate reviewed, they are-  No results for input(s): LACTATE in the last 72 hours.    Organ dysfunction indicated by   Source - Testicular abscess    Source control Achieved by- drainage of pus from wound, wound care    - Continue antibiotics as above  - Patient with persistent hypotension and poor oral intake   - Maintenance IV fluids

## 2023-01-23 NOTE — NURSING
Patient returned to room, MD aware and spoke with patient about being available instead of leaving the floor. Per MD, security will come up to search the patients room and order a UDS

## 2023-01-23 NOTE — PLAN OF CARE
Problem: Adult Inpatient Plan of Care  Goal: Plan of Care Review  Outcome: Ongoing, Progressing  Flowsheets (Taken 1/22/2023 1851)  Plan of Care Reviewed With: patient     Problem: Infection  Goal: Absence of Infection Signs and Symptoms  Outcome: Ongoing, Progressing     Problem: Fall Injury Risk  Goal: Absence of Fall and Fall-Related Injury  Outcome: Ongoing, Progressing  Intervention: Identify and Manage Contributors  Flowsheets (Taken 1/22/2023 1037)  Self-Care Promotion:   independence encouraged   BADL personal objects within reach   BADL personal routines maintained   safe use of adaptive equipment encouraged     Problem: Pain Acute  Goal: Acceptable Pain Control and Functional Ability  Outcome: Ongoing, Progressing  Intervention: Develop Pain Management Plan  Flowsheets (Taken 1/22/2023 1037)  Pain Management Interventions: pain management plan reviewed with patient/caregiver  Intervention: Optimize Psychosocial Wellbeing  Flowsheets (Taken 1/22/2023 1037)  Supportive Measures: positive reinforcement provided

## 2023-01-24 LAB
ALBUMIN SERPL BCP-MCNC: 3 G/DL (ref 3.5–5.2)
ALP SERPL-CCNC: 86 U/L (ref 55–135)
ALT SERPL W/O P-5'-P-CCNC: 7 U/L (ref 10–44)
AMPHET+METHAMPHET UR QL: NEGATIVE
ANION GAP SERPL CALC-SCNC: 11 MMOL/L (ref 8–16)
AST SERPL-CCNC: 7 U/L (ref 10–40)
BACTERIA SPEC ANAEROBE CULT: NORMAL
BARBITURATES UR QL SCN>200 NG/ML: NEGATIVE
BASOPHILS # BLD AUTO: 0.02 K/UL (ref 0–0.2)
BASOPHILS NFR BLD: 0.5 % (ref 0–1.9)
BENZODIAZ UR QL SCN>200 NG/ML: NEGATIVE
BILIRUB SERPL-MCNC: 0.2 MG/DL (ref 0.1–1)
BUN SERPL-MCNC: 18 MG/DL (ref 6–20)
BZE UR QL SCN: NEGATIVE
CALCIUM SERPL-MCNC: 9.3 MG/DL (ref 8.7–10.5)
CANNABINOIDS UR QL SCN: ABNORMAL
CHLORIDE SERPL-SCNC: 109 MMOL/L (ref 95–110)
CO2 SERPL-SCNC: 18 MMOL/L (ref 23–29)
CREAT SERPL-MCNC: 1.1 MG/DL (ref 0.5–1.4)
CREAT UR-MCNC: 129 MG/DL (ref 23–375)
DIFFERENTIAL METHOD: ABNORMAL
EOSINOPHIL # BLD AUTO: 0.2 K/UL (ref 0–0.5)
EOSINOPHIL NFR BLD: 5.1 % (ref 0–8)
ERYTHROCYTE [DISTWIDTH] IN BLOOD BY AUTOMATED COUNT: 16 % (ref 11.5–14.5)
EST. GFR  (NO RACE VARIABLE): >60 ML/MIN/1.73 M^2
ETHANOL UR-MCNC: <10 MG/DL
GLUCOSE SERPL-MCNC: 113 MG/DL (ref 70–110)
HCT VFR BLD AUTO: 33.5 % (ref 40–54)
HGB BLD-MCNC: 10.4 G/DL (ref 14–18)
IMM GRANULOCYTES # BLD AUTO: 0.01 K/UL (ref 0–0.04)
IMM GRANULOCYTES NFR BLD AUTO: 0.2 % (ref 0–0.5)
LYMPHOCYTES # BLD AUTO: 1.7 K/UL (ref 1–4.8)
LYMPHOCYTES NFR BLD: 41.2 % (ref 18–48)
MCH RBC QN AUTO: 25.8 PG (ref 27–31)
MCHC RBC AUTO-ENTMCNC: 31 G/DL (ref 32–36)
MCV RBC AUTO: 83 FL (ref 82–98)
METHADONE UR QL SCN>300 NG/ML: ABNORMAL
MONOCYTES # BLD AUTO: 0.4 K/UL (ref 0.3–1)
MONOCYTES NFR BLD: 9.9 % (ref 4–15)
NEUTROPHILS # BLD AUTO: 1.8 K/UL (ref 1.8–7.7)
NEUTROPHILS NFR BLD: 43.1 % (ref 38–73)
NRBC BLD-RTO: 0 /100 WBC
OPIATES UR QL SCN: ABNORMAL
PCP UR QL SCN>25 NG/ML: NEGATIVE
PLATELET # BLD AUTO: 200 K/UL (ref 150–450)
PMV BLD AUTO: 11.5 FL (ref 9.2–12.9)
POTASSIUM SERPL-SCNC: 3.8 MMOL/L (ref 3.5–5.1)
PROT SERPL-MCNC: 7 G/DL (ref 6–8.4)
RBC # BLD AUTO: 4.03 M/UL (ref 4.6–6.2)
SODIUM SERPL-SCNC: 138 MMOL/L (ref 136–145)
TOXICOLOGY INFORMATION: ABNORMAL
WBC # BLD AUTO: 4.13 K/UL (ref 3.9–12.7)

## 2023-01-24 PROCEDURE — S4991 NICOTINE PATCH NONLEGEND: HCPCS

## 2023-01-24 PROCEDURE — 25000003 PHARM REV CODE 250: Performed by: STUDENT IN AN ORGANIZED HEALTH CARE EDUCATION/TRAINING PROGRAM

## 2023-01-24 PROCEDURE — 25000003 PHARM REV CODE 250

## 2023-01-24 PROCEDURE — 85025 COMPLETE CBC W/AUTO DIFF WBC: CPT

## 2023-01-24 PROCEDURE — 80307 DRUG TEST PRSMV CHEM ANLYZR: CPT | Performed by: STUDENT IN AN ORGANIZED HEALTH CARE EDUCATION/TRAINING PROGRAM

## 2023-01-24 PROCEDURE — 99233 PR SUBSEQUENT HOSPITAL CARE,LEVL III: ICD-10-PCS | Mod: ,,, | Performed by: STUDENT IN AN ORGANIZED HEALTH CARE EDUCATION/TRAINING PROGRAM

## 2023-01-24 PROCEDURE — 80053 COMPREHEN METABOLIC PANEL: CPT

## 2023-01-24 PROCEDURE — 12000002 HC ACUTE/MED SURGE SEMI-PRIVATE ROOM

## 2023-01-24 PROCEDURE — 36415 COLL VENOUS BLD VENIPUNCTURE: CPT

## 2023-01-24 PROCEDURE — 99233 SBSQ HOSP IP/OBS HIGH 50: CPT | Mod: ,,, | Performed by: STUDENT IN AN ORGANIZED HEALTH CARE EDUCATION/TRAINING PROGRAM

## 2023-01-24 RX ADMIN — OXYCODONE HYDROCHLORIDE 10 MG: 10 TABLET ORAL at 02:01

## 2023-01-24 RX ADMIN — OXYCODONE HYDROCHLORIDE 10 MG: 10 TABLET ORAL at 08:01

## 2023-01-24 RX ADMIN — ACETAMINOPHEN 1000 MG: 500 TABLET ORAL at 05:01

## 2023-01-24 RX ADMIN — NICOTINE 1 PATCH: 14 PATCH, EXTENDED RELEASE TRANSDERMAL at 08:01

## 2023-01-24 RX ADMIN — ACETAMINOPHEN 1000 MG: 500 TABLET ORAL at 02:01

## 2023-01-24 RX ADMIN — OXYCODONE HYDROCHLORIDE 10 MG: 10 TABLET ORAL at 10:01

## 2023-01-24 RX ADMIN — SULFAMETHOXAZOLE AND TRIMETHOPRIM 1 TABLET: 800; 160 TABLET ORAL at 08:01

## 2023-01-24 NOTE — PLAN OF CARE
Problem: Adult Inpatient Plan of Care  Goal: Plan of Care Review  Outcome: Ongoing, Progressing  Goal: Patient-Specific Goal (Individualized)  Outcome: Ongoing, Progressing  Goal: Absence of Hospital-Acquired Illness or Injury  Outcome: Ongoing, Progressing  Goal: Optimal Comfort and Wellbeing  Outcome: Ongoing, Progressing  Goal: Readiness for Transition of Care  Outcome: Ongoing, Progressing     Problem: Infection  Goal: Absence of Infection Signs and Symptoms  Outcome: Ongoing, Progressing     Problem: Fall Injury Risk  Goal: Absence of Fall and Fall-Related Injury  Outcome: Ongoing, Progressing     Problem: Pain Acute  Goal: Acceptable Pain Control and Functional Ability  Outcome: Ongoing, Progressing

## 2023-01-24 NOTE — PLAN OF CARE
[facility] ([place] [address])  Initial Discharge Assessment       Primary Care Provider: Primary Doctor No    Admission Diagnosis: Leg swelling [M79.89]  Post op infection [T81.40XA]  Wound infection [T14.8XXA, L08.9]  Chest pain [R07.9]  Post-operative wound abscess [T81.49XA]    Admission Date: 1/20/2023  Expected Discharge Date: 1/25/2023    Discharge Barriers Identified: (P) Homeless, Underinsured    Payor: MEDICAID / Plan: [place] [employer] / Product Type: Managed Medicaid /     No emergency contact information on file.    Discharge Plan A: (P) Home Health  Discharge Plan B: Long-term acute care facility (LTAC)    No Pharmacies Listed    Initial Assessment (most recent)       Adult Discharge Assessment - 01/24/23 1043          Discharge Assessment    Assessment Type Discharge Planning Assessment (P)      Confirmed/corrected address, phone number and insurance -- (P)    homeless rignt now; cell phone is correct.    Source of Information patient (P)      Communicated CEDRIC with patient/caregiver No (P)      Reason For Admission Medicaid (P)      Do you expect to return to your current living situation? Yes (P)      Do you have help at home or someone to help you manage your care at home? No (P)      Prior to hospitilization cognitive status: Alert/Oriented (P)      Current cognitive status: Alert/Oriented (P)      Equipment Currently Used at Home none (P)      Readmission within 30 days? Yes (P)      Do you currently have service(s) that help you manage your care at home? No (P)      Do you take prescription medications? No (P)      Who is going to help you get home at discharge? He doesn't know; he is working on getting someone to pick him up. (P)      How do you get to doctors appointments? other (see comments) (P)    Still trying to figure that out.    Are you on dialysis? No (P)      Do you take coumadin? No (P)      Discharge Plan A Home Health (P)      DME Needed Upon Discharge  other (see  comments) (P)    tbd    Discharge Plan discussed with: Patient (P)      Discharge Barriers Identified Homeless;Underinsured (P)                    Spoke with patient via phone; he is homeless and has in state Medicaid.  He states he has trouble doing his wound care for testicular abscess.    12:50 PM  Calling Tamiko [phone] Lety, transferred to [facility] they did receive referral.  Medicaid - they will have to reach out to  for approval - not sure where that is in the process.  She will ask navigator for status.  She has my number and will call back.    TED DamonN, BS, RN, CCM

## 2023-01-25 PROCEDURE — S4991 NICOTINE PATCH NONLEGEND: HCPCS

## 2023-01-25 PROCEDURE — 12000002 HC ACUTE/MED SURGE SEMI-PRIVATE ROOM

## 2023-01-25 PROCEDURE — 25000003 PHARM REV CODE 250: Performed by: STUDENT IN AN ORGANIZED HEALTH CARE EDUCATION/TRAINING PROGRAM

## 2023-01-25 PROCEDURE — 25000003 PHARM REV CODE 250

## 2023-01-25 PROCEDURE — 99900035 HC TECH TIME PER 15 MIN (STAT)

## 2023-01-25 PROCEDURE — 99232 SBSQ HOSP IP/OBS MODERATE 35: CPT | Mod: ,,, | Performed by: INTERNAL MEDICINE

## 2023-01-25 PROCEDURE — 94761 N-INVAS EAR/PLS OXIMETRY MLT: CPT

## 2023-01-25 PROCEDURE — 99232 PR SUBSEQUENT HOSPITAL CARE,LEVL II: ICD-10-PCS | Mod: ,,, | Performed by: INTERNAL MEDICINE

## 2023-01-25 RX ADMIN — POLYETHYLENE GLYCOL 3350 17 G: 17 POWDER, FOR SOLUTION ORAL at 08:01

## 2023-01-25 RX ADMIN — SULFAMETHOXAZOLE AND TRIMETHOPRIM 1 TABLET: 800; 160 TABLET ORAL at 08:01

## 2023-01-25 RX ADMIN — ACETAMINOPHEN 1000 MG: 500 TABLET ORAL at 09:01

## 2023-01-25 RX ADMIN — OXYCODONE HYDROCHLORIDE 10 MG: 10 TABLET ORAL at 08:01

## 2023-01-25 RX ADMIN — OXYCODONE HYDROCHLORIDE 10 MG: 10 TABLET ORAL at 12:01

## 2023-01-25 RX ADMIN — OXYCODONE HYDROCHLORIDE 10 MG: 10 TABLET ORAL at 04:01

## 2023-01-25 RX ADMIN — NICOTINE 1 PATCH: 14 PATCH, EXTENDED RELEASE TRANSDERMAL at 08:01

## 2023-01-25 NOTE — PROGRESS NOTES
"[facility] ([address] Medicine  Progress Note    Patient Name: Endy Bruno  MRN: [id number]  Patient Class: IP- Inpatient   Admission Date: 1/20/2023  Length of Stay: 1 days  Attending Physician: Nam Salcedo MD  Primary Care Provider: Primary Doctor No        Subjective:     Principal Problem:Testicular abscess        HPI:  Endy Bruno is a 47 y.o. 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 Patient presented to the emergency department due to concern for wound infection. Per chart review, patient had been d/c'ed following his orchiectomy to an LTAC for IV abx and wound care for his open scrotal wound. He reportedly left AMA because he was not allowed visitors on 1/13/22. He has not since received abx nor wound care and is self reported to be "living on the streets." He presents today after noticing that his wound was "leaking pus" and for worsening pain. Reports most recent IVDU prior to arrival. He endorses chills and nausea. Denies chest pain, shortness of breath, urinary changes, diarrhea, or vomiting.    ED: mildly hypotensive at 95/53 and tachycardic up to 102 bpm. Low grade temp of 99.9. WBC of 6.98K, Hb 10.7. CMP within normal limitis. UA negative. Wound cultures obtained. Scrotal US with evidence of interval postoperative change of right orchiectomy in this patient with reported history of torsion.  There is a 0.8 cm anechoic focus in the testicular fossa resection bed with adjacent soft tissue thickening/edema and hyperemia noting heterogeneous echogenicity at the reported incision site.  Findings could relate to underlying soft tissue infection/cellulitis in the appropriate clinical setting there is postoperative change with associated postoperative fluid/fluid collection noting sterility of fluid cannot be assessed on " ultrasound.  Correlation with physical exam and appropriate subspecialty consultation advised. Urology consulted and packed the wound at the bedside with iodoform gauze following washout with iodine and NS. There were no areas requiring debridement. Lower extremity US without DVT. Given IV ciprofloxacin, toradol, and a nicotine patch.      Overview/Hospital Course:  No notes on file    Interval History: Awaiting placement. Left AMA from LTAC, ready for discharge. He wants to go to LTAC.    Review of Systems   Constitutional:  Negative for activity change, appetite change, chills, diaphoresis, fatigue and fever.   HENT:  Negative for rhinorrhea and sore throat.    Respiratory:  Negative for cough, chest tightness and shortness of breath.    Cardiovascular:  Negative for chest pain and palpitations.   Gastrointestinal:  Negative for abdominal pain, constipation, diarrhea, nausea and vomiting.   Endocrine: Negative for cold intolerance.   Genitourinary:  Positive for testicular pain. Negative for decreased urine volume and dysuria.   Musculoskeletal:  Negative for arthralgias and myalgias.   Skin:  Negative for rash and wound.   Neurological:  Negative for dizziness, weakness, numbness and headaches.   Psychiatric/Behavioral:  Negative for agitation, behavioral problems and confusion.    Objective:     Vital Signs (Most Recent):  Temp: 97.6 °F (36.4 °C) (01/25/23 0833)  Pulse: 89 (01/25/23 0833)  Resp: 18 (01/25/23 0833)  BP: 132/89 (01/25/23 0833)  SpO2: 100 % (01/25/23 0926) Vital Signs (24h Range):  Temp:  [97.6 °F (36.4 °C)-98.5 °F (36.9 °C)] 97.6 °F (36.4 °C)  Pulse:  [] 89  Resp:  [17-20] 18  SpO2:  [96 %-100 %] 100 %  BP: (108-145)/(64-89) 132/89     Weight: 59 kg (129 lb 15.7 oz)  Body mass index is 21.63 kg/m².    Intake/Output Summary (Last 24 hours) at 1/25/2023 1027  Last data filed at 1/24/2023 1855  Gross per 24 hour   Intake 480 ml   Output --   Net 480 ml      Physical Exam  Vitals and nursing  note reviewed.   Constitutional:       General: He is not in acute distress.     Appearance: He is well-developed. He is not diaphoretic.      Comments: Chronically ill-appearing   HENT:      Head: Normocephalic and atraumatic.      Mouth/Throat:      Pharynx: No oropharyngeal exudate.   Eyes:      Extraocular Movements: Extraocular movements intact.      Conjunctiva/sclera: Conjunctivae normal.   Cardiovascular:      Rate and Rhythm: Regular rhythm. Tachycardia present.      Heart sounds: Normal heart sounds.   Pulmonary:      Effort: Pulmonary effort is normal. No respiratory distress.      Breath sounds: Normal breath sounds. No wheezing.   Abdominal:      General: Bowel sounds are normal. There is no distension.      Palpations: Abdomen is soft.      Tenderness: There is no abdominal tenderness.   Genitourinary:     Comments: Previous open midline scrotal incision without surrounding necrosis or erythema. Now packed and clean.   Musculoskeletal:         General: No tenderness. Normal range of motion.      Cervical back: Normal range of motion and neck supple.   Lymphadenopathy:      Cervical: No cervical adenopathy.   Skin:     General: Skin is warm.      Findings: No rash.   Neurological:      Mental Status: He is alert and oriented to person, place, and time.      Cranial Nerves: No cranial nerve deficit.      Sensory: No sensory deficit.      Coordination: Coordination normal.   Psychiatric:         Behavior: Behavior normal.         Thought Content: Thought content normal.         Judgment: Judgment normal.       Significant Labs: All pertinent labs within the past 24 hours have been reviewed.  BMP:   Recent Labs   Lab 01/24/23  0447   *      K 3.8      CO2 18*   BUN 18   CREATININE 1.1   CALCIUM 9.3       Significant Imaging: I have reviewed all pertinent imaging results/findings within the past 24 hours.      Assessment/Plan:      * Testicular abscess  Surgical wound present     - s/p  right orchiectomy and right hemiscrotal abscess I&D   - Mildly hypotensive and low grade fever in the ED  - s/p 500 cc IVF   - US of scrotum and testicles with evidence of interval postoperative change of right orchiectomy in this patient with reported history of torsion.  There is a 0.8 cm anechoic focus in the testicular fossa resection bed with adjacent soft tissue thickening/edema and hyperemia noting heterogeneous echogenicity at the reported incision site.  Findings could relate to underlying soft tissue infection/cellulitis in the appropriate clinical setting there is postoperative change with associated postoperative fluid/fluid collection noting sterility of fluid cannot be assessed on ultrasound.   - Urology consulted in the ED who cleaned and packed wound  - Recommend continuing abx with Bactrim DS for 7 - 10 days and return to LTAC  - Appreciate SW/CM assistance with placement  - Will need urology follow-up  - prn analgesics    SIRS (systemic inflammatory response syndrome)  This patient does have evidence of infective focus  My overall impression is sepsis. Vital signs were reviewed and noted in progress note.  Antibiotics given-   Antibiotics (From admission, onward)    Start     Stop Route Frequency Ordered    01/21/23 0915  sulfamethoxazole-trimethoprim 800-160mg per tablet 1 tablet         -- Oral 2 times daily 01/21/23 0804        Cultures were taken-   Microbiology Results (last 7 days)     Procedure Component Value Units Date/Time    Aerobic culture [[id number]] Collected: 01/20/23 0547    Order Status: Completed Specimen: Abscess from Groin Updated: 01/23/23 1051     Aerobic Bacterial Culture Genital sadaf, no predominant organism    Culture, Anaerobe [[id number]] Collected: 01/20/23 0547    Order Status: Completed Specimen: Abscess from Groin Updated: 01/21/23 1409     Anaerobic Culture Culture in progress    Aerobic culture (Specify Source) **CANNOT BE ORDERED AS STAT* [[id number]] Collected:  01/20/23 0550    Order Status: Sent Specimen: Wound from Sacrum Updated: 01/20/23 0551        Latest lactate reviewed, they are-  No results for input(s): LACTATE in the last 72 hours.    Organ dysfunction indicated by   Source - Testicular abscess    Source control Achieved by- drainage of pus from wound, wound care    - Continue antibiotics as above  - Patient with persistent hypotension and poor oral intake   - Maintenance IV fluids    Surgical wound present  - See above    Opioid use disorder, severe, dependence  - Reports last heroin use prior to arrival  - PRNn ativan for withdrawal symptoms  - PRN anti-emetics  - No s/s of withdrawal noted      Anemia of infection and chronic disease  - patient's anemia is currently controlled.  - current CBC reviewed -   Lab Results   Component Value Date    HGB 10.0 (L) 01/22/2023    HCT 31.1 (L) 01/22/2023     - Monitor serial CBC and transfuse if patient becomes hemodynamically unstable, symptomatic, or H/H drops below 7/21.         Dependence on nicotine from cigarettes  - Nicotine patch        VTE Risk Mitigation (From admission, onward)         Ordered     IP VTE HIGH RISK PATIENT  Once         01/20/23 1550     Reason for No Pharmacological VTE Prophylaxis  Once        Question:  Reasons:  Answer:  Risk of Bleeding    01/20/23 1550                Discharge Planning   CEDRIC: 1/25/2023     Code Status: Full Code   Is the patient medically ready for discharge?: No    Reason for patient still in hospital (select all that apply): Patient unstable  Discharge Plan A: Home Health   Discharge Delays: (!) Post-Acute Set-up              Nam Salcedo MD  Department of Hospital Medicine   [facility] - Intensive Care ([place]-[facility])

## 2023-01-25 NOTE — PLAN OF CARE
Problem: Adult Inpatient Plan of Care  Goal: Plan of Care Review  1/25/2023 1755 by Tamara Carrillo RN  Outcome: Ongoing, Progressing  1/25/2023 1754 by Tamara Carrillo RN  Outcome: Ongoing, Progressing  Goal: Patient-Specific Goal (Individualized)  1/25/2023 1755 by Tamara Carrillo RN  Outcome: Ongoing, Progressing  1/25/2023 1754 by Tamara Carrillo RN  Outcome: Ongoing, Progressing  Goal: Absence of Hospital-Acquired Illness or Injury  1/25/2023 1755 by Tamara Carrillo RN  Outcome: Ongoing, Progressing  1/25/2023 1754 by Tamara Carrillo RN  Outcome: Ongoing, Progressing  Goal: Optimal Comfort and Wellbeing  1/25/2023 1755 by Tamara Carrillo RN  Outcome: Ongoing, Progressing  1/25/2023 1754 by Tamara Carrillo RN  Outcome: Ongoing, Progressing  Goal: Readiness for Transition of Care  1/25/2023 1755 by Tamara Carrillo RN  Outcome: Ongoing, Progressing  1/25/2023 1754 by Tamara Carrillo RN  Outcome: Ongoing, Progressing     Problem: Infection  Goal: Absence of Infection Signs and Symptoms  1/25/2023 1755 by Tamara Carrillo RN  Outcome: Ongoing, Progressing  1/25/2023 1754 by Tamara Carrillo RN  Outcome: Ongoing, Progressing     Problem: Fall Injury Risk  Goal: Absence of Fall and Fall-Related Injury  1/25/2023 1755 by Tamara Carrillo RN  Outcome: Ongoing, Progressing  1/25/2023 1754 by Tamara Carrillo RN  Outcome: Ongoing, Progressing     Problem: Pain Acute  Goal: Acceptable Pain Control and Functional Ability  1/25/2023 1755 by Tamara Carrillo RN  Outcome: Ongoing, Progressing  1/25/2023 1754 by Tamara Carrillo RN  Outcome: Ongoing, Progressing

## 2023-01-25 NOTE — HOSPITAL COURSE
1/25 2023  seen by Urology in ED. Awaiting placement. Left AMA from LTAC- states he will stay this time., ready for discharge. He wants to go to LTAC. Not allowed to leave floor.   1/26 s/p  right orchiectomy and right hemiscrotal abscess I&D. No urologic interventions  Wound packed with iodoform gauze following washout with iodine and NS, no areas requiring debridement . 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 continue PO Bactrim DS for 7-10 days . F/u with Urology clinic in 2-3 weeks. wound care following. Urology consulted for hole to the Right  side of his scrotum not present during previous admit.   UTox + for methadone, opiates and marijuana.  addiction psychiatry consulted. s/p urology eval for New  open draining tract on right lateral scrotum skin. no urological intervention. [facility] wound care  referral . Probably cant go back to LTAC, trying SNF vs shelter with wound care   1/27 addiction psychiatry eval today.  per wound care recs -  needs daily care - cleanse wound w/ Vashe moistened gauze, pack  Iodoform gauze, abd pad and secure w/ mesh briefs. Daily showers. staff to educate him regarding wound care. plan to discharge with [facility] urology and wound care follow up with bactrim x7 days . oxycodone discontinued. started on celebrex 200mg daily and robaxin. addiction psychiatry to  reassess patient tomorrow to consider suboxone initiation   1/28 off opiates for > 12h. Plan to discharge to [facility]  with wound supplies, [facility] wound care and urology follow up.  Psychiatry follow up today with  suboxone initiation . prescribed suboxone. patient to discharge to [facility] with psychiatry follow up

## 2023-01-25 NOTE — PROGRESS NOTES
"[facility] ([address] Medicine  Progress Note    Patient Name: Endy Bruno  MRN: [id number]  Patient Class: IP- Inpatient   Admission Date: 1/20/2023  Length of Stay: 0 days  Attending Physician: Dominic Lincoln MD  Primary Care Provider: Primary Doctor No        Subjective:     Principal Problem:Testicular abscess        HPI:  Endy Bruno is a 47 y.o. 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 Patient presented to the emergency department due to concern for wound infection. Per chart review, patient had been d/c'ed following his orchiectomy to an LTAC for IV abx and wound care for his open scrotal wound. He reportedly left AMA because he was not allowed visitors on 1/13/22. He has not since received abx nor wound care and is self reported to be "living on the streets." He presents today after noticing that his wound was "leaking pus" and for worsening pain. Reports most recent IVDU prior to arrival. He endorses chills and nausea. Denies chest pain, shortness of breath, urinary changes, diarrhea, or vomiting.    ED: mildly hypotensive at 95/53 and tachycardic up to 102 bpm. Low grade temp of 99.9. WBC of 6.98K, Hb 10.7. CMP within normal limitis. UA negative. Wound cultures obtained. Scrotal US with evidence of interval postoperative change of right orchiectomy in this patient with reported history of torsion.  There is a 0.8 cm anechoic focus in the testicular fossa resection bed with adjacent soft tissue thickening/edema and hyperemia noting heterogeneous echogenicity at the reported incision site.  Findings could relate to underlying soft tissue infection/cellulitis in the appropriate clinical setting there is postoperative change with associated postoperative fluid/fluid collection noting sterility of fluid cannot be assessed on " ultrasound.  Correlation with physical exam and appropriate subspecialty consultation advised. Urology consulted and packed the wound at the bedside with iodoform gauze following washout with iodine and NS. There were no areas requiring debridement. Lower extremity US without DVT. Given IV ciprofloxacin, toradol, and a nicotine patch.      Overview/Hospital Course:  No notes on file    Interval History:   No events overnight. No new complaints today. Continued to reinforce patient should not be off floor without informing nursing prior and for extend durations.       Review of Systems   Constitutional:  Negative for activity change, appetite change, chills, diaphoresis, fatigue and fever.   HENT:  Negative for rhinorrhea and sore throat.    Respiratory:  Negative for cough, chest tightness and shortness of breath.    Cardiovascular:  Negative for chest pain and palpitations.   Gastrointestinal:  Negative for abdominal pain, constipation, diarrhea, nausea and vomiting.   Endocrine: Negative for cold intolerance.   Genitourinary:  Positive for testicular pain. Negative for decreased urine volume and dysuria.   Musculoskeletal:  Negative for arthralgias and myalgias.   Skin:  Negative for rash and wound.   Neurological:  Negative for dizziness, weakness, numbness and headaches.   Psychiatric/Behavioral:  Negative for agitation, behavioral problems and confusion.    Objective:     Vital Signs (Most Recent):  Temp: 98.5 °F (36.9 °C) (01/24/23 1136)  Pulse: 83 (01/24/23 1136)  Resp: 20 (01/24/23 1136)  BP: (!) 145/86 (01/24/23 1136)  SpO2: 100 % (01/24/23 1136)   Vital Signs (24h Range):  Temp:  [96.9 °F (36.1 °C)-98.5 °F (36.9 °C)] 98.5 °F (36.9 °C)  Pulse:  [] 83  Resp:  [15-20] 20  SpO2:  [95 %-100 %] 100 %  BP: (101-145)/(52-86) 145/86     Weight: 59 kg (129 lb 15.7 oz)  Body mass index is 21.63 kg/m².    Intake/Output Summary (Last 24 hours) at 1/24/2023 3037  Last data filed at 1/24/2023 0686  Gross per 24  hour   Intake 740 ml   Output --   Net 740 ml      Physical Exam  Vitals and nursing note reviewed.   Constitutional:       General: He is not in acute distress.     Appearance: He is well-developed. He is not diaphoretic.      Comments: Chronically ill-appearing   HENT:      Head: Normocephalic and atraumatic.      Mouth/Throat:      Pharynx: No oropharyngeal exudate.   Eyes:      Extraocular Movements: Extraocular movements intact.      Conjunctiva/sclera: Conjunctivae normal.   Cardiovascular:      Rate and Rhythm: Regular rhythm. Tachycardia present.      Heart sounds: Normal heart sounds.   Pulmonary:      Effort: Pulmonary effort is normal. No respiratory distress.      Breath sounds: Normal breath sounds. No wheezing.   Abdominal:      General: Bowel sounds are normal. There is no distension.      Palpations: Abdomen is soft.      Tenderness: There is no abdominal tenderness.   Genitourinary:     Comments: Previous open midline scrotal incision without surrounding necrosis or erythema. Now packed and clean.   Musculoskeletal:         General: No tenderness. Normal range of motion.      Cervical back: Normal range of motion and neck supple.   Lymphadenopathy:      Cervical: No cervical adenopathy.   Skin:     General: Skin is warm.      Findings: No rash.   Neurological:      Mental Status: He is alert and oriented to person, place, and time.      Cranial Nerves: No cranial nerve deficit.      Sensory: No sensory deficit.      Coordination: Coordination normal.   Psychiatric:         Behavior: Behavior normal.         Thought Content: Thought content normal.         Judgment: Judgment normal.       Significant Labs: All pertinent labs within the past 24 hours have been reviewed.  CBC:   Recent Labs   Lab 01/24/23  0447   WBC 4.13   HGB 10.4*   HCT 33.5*        CMP:   Recent Labs   Lab 01/24/23  0447      K 3.8      CO2 18*   *   BUN 18   CREATININE 1.1   CALCIUM 9.3   PROT 7.0    ALBUMIN 3.0*   BILITOT 0.2   ALKPHOS 86   AST 7*   ALT 7*   ANIONGAP 11       Significant Imaging: I have reviewed all pertinent imaging results/findings within the past 24 hours.      Assessment/Plan:      * Testicular abscess  Surgical wound present     - s/p right orchiectomy and right hemiscrotal abscess I&D   - Mildly hypotensive and low grade fever in the ED  - s/p 500 cc IVF   - US of scrotum and testicles with evidence of interval postoperative change of right orchiectomy in this patient with reported history of torsion.  There is a 0.8 cm anechoic focus in the testicular fossa resection bed with adjacent soft tissue thickening/edema and hyperemia noting heterogeneous echogenicity at the reported incision site.  Findings could relate to underlying soft tissue infection/cellulitis in the appropriate clinical setting there is postoperative change with associated postoperative fluid/fluid collection noting sterility of fluid cannot be assessed on ultrasound.   - Urology consulted in the ED who cleaned and packed wound  - Recommend continuing abx with Bactrim DS for 7 - 10 days and return to LTAC  - Appreciate SW/CM assistance with placement  - Will need urology follow-up  - prn analgesics    SIRS (systemic inflammatory response syndrome)  This patient does have evidence of infective focus  My overall impression is sepsis. Vital signs were reviewed and noted in progress note.  Antibiotics given-   Antibiotics (From admission, onward)    Start     Stop Route Frequency Ordered    01/21/23 0915  sulfamethoxazole-trimethoprim 800-160mg per tablet 1 tablet         -- Oral 2 times daily 01/21/23 0804        Cultures were taken-   Microbiology Results (last 7 days)     Procedure Component Value Units Date/Time    Aerobic culture [[id number]] Collected: 01/20/23 0558    Order Status: Completed Specimen: Abscess from Groin Updated: 01/23/23 1051     Aerobic Bacterial Culture Genital sadaf, no predominant organism     Culture, Anaerobe [[id number]] Collected: 01/20/23 0540    Order Status: Completed Specimen: Abscess from Groin Updated: 01/21/23 1409     Anaerobic Culture Culture in progress    Aerobic culture (Specify Source) **CANNOT BE ORDERED AS STAT* [[id number]] Collected: 01/20/23 0550    Order Status: Sent Specimen: Wound from Sacrum Updated: 01/20/23 0551        Latest lactate reviewed, they are-  No results for input(s): LACTATE in the last 72 hours.    Organ dysfunction indicated by   Source - Testicular abscess    Source control Achieved by- drainage of pus from wound, wound care    - Continue antibiotics as above  - Patient with persistent hypotension and poor oral intake   - Maintenance IV fluids    Surgical wound present  - See above    Opioid use disorder, severe, dependence  - Reports last heroin use prior to arrival  - PRNn ativan for withdrawal symptoms  - PRN anti-emetics  - No s/s of withdrawal noted      Anemia of infection and chronic disease  - patient's anemia is currently controlled.  - current CBC reviewed -   Lab Results   Component Value Date    HGB 10.0 (L) 01/22/2023    HCT 31.1 (L) 01/22/2023     - Monitor serial CBC and transfuse if patient becomes hemodynamically unstable, symptomatic, or H/H drops below 7/21.         Dependence on nicotine from cigarettes  - Nicotine patch        VTE Risk Mitigation (From admission, onward)         Ordered     IP VTE HIGH RISK PATIENT  Once         01/20/23 1550     Reason for No Pharmacological VTE Prophylaxis  Once        Question:  Reasons:  Answer:  Risk of Bleeding    01/20/23 1550                Discharge Planning   CEDRIC: 1/25/2023     Code Status: Full Code   Is the patient medically ready for discharge?: No    Reason for patient still in hospital (select all that apply): Patient trending condition, Treatment and Pending disposition  Discharge Plan A: Home Health   Discharge Delays: (!) Post-Acute Set-up              Dominic Lincoln MD  Department of  [facility]   [facility] ([place])

## 2023-01-25 NOTE — SUBJECTIVE & OBJECTIVE
Interval History:   No events overnight. No new complaints today. Continued to reinforce patient should not be off floor without informing nursing prior and for extend durations.       Review of Systems   Constitutional:  Negative for activity change, appetite change, chills, diaphoresis, fatigue and fever.   HENT:  Negative for rhinorrhea and sore throat.    Respiratory:  Negative for cough, chest tightness and shortness of breath.    Cardiovascular:  Negative for chest pain and palpitations.   Gastrointestinal:  Negative for abdominal pain, constipation, diarrhea, nausea and vomiting.   Endocrine: Negative for cold intolerance.   Genitourinary:  Positive for testicular pain. Negative for decreased urine volume and dysuria.   Musculoskeletal:  Negative for arthralgias and myalgias.   Skin:  Negative for rash and wound.   Neurological:  Negative for dizziness, weakness, numbness and headaches.   Psychiatric/Behavioral:  Negative for agitation, behavioral problems and confusion.    Objective:     Vital Signs (Most Recent):  Temp: 98.5 °F (36.9 °C) (01/24/23 1136)  Pulse: 83 (01/24/23 1136)  Resp: 20 (01/24/23 1136)  BP: (!) 145/86 (01/24/23 1136)  SpO2: 100 % (01/24/23 1136)   Vital Signs (24h Range):  Temp:  [96.9 °F (36.1 °C)-98.5 °F (36.9 °C)] 98.5 °F (36.9 °C)  Pulse:  [] 83  Resp:  [15-20] 20  SpO2:  [95 %-100 %] 100 %  BP: (101-145)/(52-86) 145/86     Weight: 59 kg (129 lb 15.7 oz)  Body mass index is 21.63 kg/m².    Intake/Output Summary (Last 24 hours) at 1/24/2023 1846  Last data filed at 1/24/2023 0658  Gross per 24 hour   Intake 740 ml   Output --   Net 740 ml      Physical Exam  Vitals and nursing note reviewed.   Constitutional:       General: He is not in acute distress.     Appearance: He is well-developed. He is not diaphoretic.      Comments: Chronically ill-appearing   HENT:      Head: Normocephalic and atraumatic.      Mouth/Throat:      Pharynx: No oropharyngeal exudate.   Eyes:       Extraocular Movements: Extraocular movements intact.      Conjunctiva/sclera: Conjunctivae normal.   Cardiovascular:      Rate and Rhythm: Regular rhythm. Tachycardia present.      Heart sounds: Normal heart sounds.   Pulmonary:      Effort: Pulmonary effort is normal. No respiratory distress.      Breath sounds: Normal breath sounds. No wheezing.   Abdominal:      General: Bowel sounds are normal. There is no distension.      Palpations: Abdomen is soft.      Tenderness: There is no abdominal tenderness.   Genitourinary:     Comments: Previous open midline scrotal incision without surrounding necrosis or erythema. Now packed and clean.   Musculoskeletal:         General: No tenderness. Normal range of motion.      Cervical back: Normal range of motion and neck supple.   Lymphadenopathy:      Cervical: No cervical adenopathy.   Skin:     General: Skin is warm.      Findings: No rash.   Neurological:      Mental Status: He is alert and oriented to person, place, and time.      Cranial Nerves: No cranial nerve deficit.      Sensory: No sensory deficit.      Coordination: Coordination normal.   Psychiatric:         Behavior: Behavior normal.         Thought Content: Thought content normal.         Judgment: Judgment normal.       Significant Labs: All pertinent labs within the past 24 hours have been reviewed.  CBC:   Recent Labs   Lab 01/24/23  0447   WBC 4.13   HGB 10.4*   HCT 33.5*        CMP:   Recent Labs   Lab 01/24/23  0447      K 3.8      CO2 18*   *   BUN 18   CREATININE 1.1   CALCIUM 9.3   PROT 7.0   ALBUMIN 3.0*   BILITOT 0.2   ALKPHOS 86   AST 7*   ALT 7*   ANIONGAP 11       Significant Imaging: I have reviewed all pertinent imaging results/findings within the past 24 hours.

## 2023-01-25 NOTE — PROGRESS NOTES
Patient seen for wound care consultation for scrotum.   Reviewed chart for this encounter.   See Flow Sheet for findings.    Pt known to our service for previous admission - surgical wound to scrotum. Pt agreeable to care at this time, freshly showered - states he has been completing his own wound care since previous admit. Previous dressing takedown and guided dressing change completed by pt. Wound cleansed w/ Vashe. Pt endorses preference for Iodoform strip gauze, 1in Iodoform packed into wound. Secured w/ ABD pad and mesh briefs.    Upon assessment of wound, pt also noted to have a hole to the R side of his scrotum that was not present during previous admit. See following flowsheet for photos. Notified primary MD and Urology residents that were following pt last admit, states they will reassess as they had signed off on pt 1/20.     RECOMMENDATIONS    Daily - cleanse wound w/ Vashe moistened gauze, pack w/ 1in Iodoform gauze, abd pad and secure w/ mesh briefs.   Re-consult urology  Daily showers      Discussed POC with patient and primary RN.   See EMR for orders & patient education    Nursing to continue care. Wound care will follow         01/25/23 1350   WOCN Assessment   WOCN Total Time (mins) 30   Visit Date 01/25/23   Visit Time 1350   Consult Type New   WOCN Speciality Wound   Wound surgical   Intervention assessed;changed;applied;chart review;coordination of care;consult other service;orders   Teaching on-going        Incision/Site 01/07/23 1020 Scrotum   Date First Assessed/Time First Assessed: 01/07/23 1020   Location: Scrotum   Wound Image     Dressing Appearance Moist drainage   Drainage Amount Small   Drainage Characteristics/Odor Serosanguineous   Appearance [place];Red   Periwound Area Moist   Wound Edges Defined   Wound Length (cm) 3 cm   Wound Width (cm) 3 cm   Wound Depth (cm) 2.6 cm   Wound Volume (cm^3) 23.4 cm^3   Wound Surface Area (cm^2) 9 cm^2   Care Cleansed with:;Wound cleanser   Packing  packed with;gauze, iodoform   Dressing Change Due 01/26/23

## 2023-01-25 NOTE — SUBJECTIVE & OBJECTIVE
Interval History: Awaiting placement. Left AMA from LTAC, ready for discharge. He wants to go to LTAC.    Review of Systems   Constitutional:  Negative for activity change, appetite change, chills, diaphoresis, fatigue and fever.   HENT:  Negative for rhinorrhea and sore throat.    Respiratory:  Negative for cough, chest tightness and shortness of breath.    Cardiovascular:  Negative for chest pain and palpitations.   Gastrointestinal:  Negative for abdominal pain, constipation, diarrhea, nausea and vomiting.   Endocrine: Negative for cold intolerance.   Genitourinary:  Positive for testicular pain. Negative for decreased urine volume and dysuria.   Musculoskeletal:  Negative for arthralgias and myalgias.   Skin:  Negative for rash and wound.   Neurological:  Negative for dizziness, weakness, numbness and headaches.   Psychiatric/Behavioral:  Negative for agitation, behavioral problems and confusion.    Objective:     Vital Signs (Most Recent):  Temp: 97.6 °F (36.4 °C) (01/25/23 0833)  Pulse: 89 (01/25/23 0833)  Resp: 18 (01/25/23 0833)  BP: 132/89 (01/25/23 0833)  SpO2: 100 % (01/25/23 0926) Vital Signs (24h Range):  Temp:  [97.6 °F (36.4 °C)-98.5 °F (36.9 °C)] 97.6 °F (36.4 °C)  Pulse:  [] 89  Resp:  [17-20] 18  SpO2:  [96 %-100 %] 100 %  BP: (108-145)/(64-89) 132/89     Weight: 59 kg (129 lb 15.7 oz)  Body mass index is 21.63 kg/m².    Intake/Output Summary (Last 24 hours) at 1/25/2023 1027  Last data filed at 1/24/2023 1855  Gross per 24 hour   Intake 480 ml   Output --   Net 480 ml      Physical Exam  Vitals and nursing note reviewed.   Constitutional:       General: He is not in acute distress.     Appearance: He is well-developed. He is not diaphoretic.      Comments: Chronically ill-appearing   HENT:      Head: Normocephalic and atraumatic.      Mouth/Throat:      Pharynx: No oropharyngeal exudate.   Eyes:      Extraocular Movements: Extraocular movements intact.      Conjunctiva/sclera: Conjunctivae  normal.   Cardiovascular:      Rate and Rhythm: Regular rhythm. Tachycardia present.      Heart sounds: Normal heart sounds.   Pulmonary:      Effort: Pulmonary effort is normal. No respiratory distress.      Breath sounds: Normal breath sounds. No wheezing.   Abdominal:      General: Bowel sounds are normal. There is no distension.      Palpations: Abdomen is soft.      Tenderness: There is no abdominal tenderness.   Genitourinary:     Comments: Previous open midline scrotal incision without surrounding necrosis or erythema. Now packed and clean.   Musculoskeletal:         General: No tenderness. Normal range of motion.      Cervical back: Normal range of motion and neck supple.   Lymphadenopathy:      Cervical: No cervical adenopathy.   Skin:     General: Skin is warm.      Findings: No rash.   Neurological:      Mental Status: He is alert and oriented to person, place, and time.      Cranial Nerves: No cranial nerve deficit.      Sensory: No sensory deficit.      Coordination: Coordination normal.   Psychiatric:         Behavior: Behavior normal.         Thought Content: Thought content normal.         Judgment: Judgment normal.       Significant Labs: All pertinent labs within the past 24 hours have been reviewed.  BMP:   Recent Labs   Lab 01/24/23  0447   *      K 3.8      CO2 18*   BUN 18   CREATININE 1.1   CALCIUM 9.3       Significant Imaging: I have reviewed all pertinent imaging results/findings within the past 24 hours.

## 2023-01-26 PROCEDURE — 99024 POSTOP FOLLOW-UP VISIT: CPT | Mod: ,,, | Performed by: UROLOGY

## 2023-01-26 PROCEDURE — 99233 SBSQ HOSP IP/OBS HIGH 50: CPT | Mod: ,,, | Performed by: HOSPITALIST

## 2023-01-26 PROCEDURE — 12000002 HC ACUTE/MED SURGE SEMI-PRIVATE ROOM

## 2023-01-26 PROCEDURE — 99024 PR POST-OP FOLLOW-UP VISIT: ICD-10-PCS | Mod: ,,, | Performed by: UROLOGY

## 2023-01-26 PROCEDURE — 25000003 PHARM REV CODE 250

## 2023-01-26 PROCEDURE — 99233 PR SUBSEQUENT HOSPITAL CARE,LEVL III: ICD-10-PCS | Mod: ,,, | Performed by: HOSPITALIST

## 2023-01-26 PROCEDURE — S4991 NICOTINE PATCH NONLEGEND: HCPCS

## 2023-01-26 PROCEDURE — 25000003 PHARM REV CODE 250: Performed by: STUDENT IN AN ORGANIZED HEALTH CARE EDUCATION/TRAINING PROGRAM

## 2023-01-26 RX ADMIN — OXYCODONE HYDROCHLORIDE 10 MG: 10 TABLET ORAL at 02:01

## 2023-01-26 RX ADMIN — NICOTINE 1 PATCH: 14 PATCH, EXTENDED RELEASE TRANSDERMAL at 09:01

## 2023-01-26 RX ADMIN — SULFAMETHOXAZOLE AND TRIMETHOPRIM 1 TABLET: 800; 160 TABLET ORAL at 08:01

## 2023-01-26 RX ADMIN — OXYCODONE HYDROCHLORIDE 10 MG: 10 TABLET ORAL at 11:01

## 2023-01-26 RX ADMIN — SULFAMETHOXAZOLE AND TRIMETHOPRIM 1 TABLET: 800; 160 TABLET ORAL at 09:01

## 2023-01-26 RX ADMIN — OXYCODONE HYDROCHLORIDE 10 MG: 10 TABLET ORAL at 06:01

## 2023-01-26 RX ADMIN — ACETAMINOPHEN 1000 MG: 500 TABLET ORAL at 06:01

## 2023-01-26 RX ADMIN — ACETAMINOPHEN 1000 MG: 500 TABLET ORAL at 02:01

## 2023-01-26 NOTE — ASSESSMENT & PLAN NOTE
Surgical wound present     - s/p right orchiectomy and right hemiscrotal abscess I&D   - Mildly hypotensive and low grade fever in the ED  - s/p 500 cc IVF   - US of scrotum and testicles with evidence of interval postoperative change of right orchiectomy in this patient with reported history of torsion.  There is a 0.8 cm anechoic focus in the testicular fossa resection bed with adjacent soft tissue thickening/edema and hyperemia noting heterogeneous echogenicity at the reported incision site.  Findings could relate to underlying soft tissue infection/cellulitis in the appropriate clinical setting there is postoperative change with associated postoperative fluid/fluid collection noting sterility of fluid cannot be assessed on ultrasound.   - Urology consulted in the ED who cleaned and packed wound  - Recommend continuing abx with Bactrim DS for 7 - 10 days and return to LTAC  - Appreciate SW/CM assistance with placement  - Will need urology follow-up  - prn analgesics  1/26 s/p  right orchiectomy and right hemiscrotal abscess I&D. No urologic interventions  Wound packed with iodoform gauze following washout with iodine and NS, no areas requiring debridement . 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 continue PO Bactrim DS for 7-10 days . F/u with Urology clinic in 2-3 weeks. wound care following.

## 2023-01-26 NOTE — ASSESSMENT & PLAN NOTE
Endy Bruno is a 47 y.o. male with prior right orchiectomy and right hemiscrotal abscess I/D who presents to the ED with worsening pain and and pus from his open wound.    - No indications for urologic interventions at this time  - Wound being assessed by wound care, appreciated assistance   - Continue to recommend return to LTAC for continued abx and wound care versus hospital admission until appropriate outpt wound care is set up  - Antibiotics per primary  - Patient to be seen at [facility]  - Rest of care per primary

## 2023-01-26 NOTE — ASSESSMENT & PLAN NOTE
- Reports last heroin use prior to arrival  - PRNn ativan for withdrawal symptoms  - PRN anti-emetics  - No s/s of withdrawal noted   1/26UTox + for methadone, opiates and marijuana.

## 2023-01-26 NOTE — PROGRESS NOTES
"[facility] ([address] Medicine  Progress Note    Patient Name: Endy Bruno  MRN: [id number]  Patient Class: IP- Inpatient   Admission Date: 1/20/2023  Length of Stay: 2 days  Attending Physician: Ryan Alcazar MD  Primary Care Provider: Primary Doctor No        Subjective:     Principal Problem:Testicular abscess        HPI:  Endy Bruno is a 47 y.o. 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 Patient presented to the emergency department due to concern for wound infection. Per chart review, patient had been d/c'ed following his orchiectomy to an LTAC for IV abx and wound care for his open scrotal wound. He reportedly left AMA because he was not allowed visitors on 1/13/22. He has not since received abx nor wound care and is self reported to be "living on the streets." He presents today after noticing that his wound was "leaking pus" and for worsening pain. Reports most recent IVDU prior to arrival. He endorses chills and nausea. Denies chest pain, shortness of breath, urinary changes, diarrhea, or vomiting.    ED: mildly hypotensive at 95/53 and tachycardic up to 102 bpm. Low grade temp of 99.9. WBC of 6.98K, Hb 10.7. CMP within normal limitis. UA negative. Wound cultures obtained. Scrotal US with evidence of interval postoperative change of right orchiectomy in this patient with reported history of torsion.  There is a 0.8 cm anechoic focus in the testicular fossa resection bed with adjacent soft tissue thickening/edema and hyperemia noting heterogeneous echogenicity at the reported incision site.  Findings could relate to underlying soft tissue infection/cellulitis in the appropriate clinical setting there is postoperative change with associated postoperative fluid/fluid collection noting sterility of fluid cannot be assessed on " ultrasound.  Correlation with physical exam and appropriate subspecialty consultation advised. Urology consulted and packed the wound at the bedside with iodoform gauze following washout with iodine and NS. There were no areas requiring debridement. Lower extremity US without DVT. Given IV ciprofloxacin, toradol, and a nicotine patch.      Overview/Hospital Course:   1/25 2023  seen by Urology in ED. Awaiting placement. Left AMA from LTAC- states he will stay this time., ready for discharge. He wants to go to LTAC. Not allowed to leave floor.   1/26 s/p  right orchiectomy and right hemiscrotal abscess I&D. No urologic interventions  Wound packed with iodoform gauze following washout with iodine and NS, no areas requiring debridement . 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 continue PO Bactrim DS for 7-10 days . F/u with Urology clinic in 2-3 weeks. wound care following. Urology consulted for hole to the Right  side of his scrotum not present during previous admit.   UTox + for methadone, opiates and marijuana.  addiction psychiatry consulted. s/p urology eval for New  open draining tract on right lateral scrotum skin. no urological intervention. [facility] wound care  referral . Probably cant go back to LTAC, trying SNF vs shelter with wound care         Review of Systems:   Pain scale:   Constitutional:  fever,  chills, headache, vision loss, hearing loss, weight loss, Generalized weakness, falls, loss of smell, loss of taste, poor appetite,  sore throat  Respiratory: cough, shortness of breath.   Cardiovascular: chest pain, dizziness, palpitations, orthopnea, swelling of feet, syncope  Gastrointestinal: nausea, vomiting, abdominal pain, diarrhea, black stool,  blood in stool, change in bowel habits  Genitourinary: hematuria, dysuria, urgency, frequency, testicular pain  Integument/Breast: rash,  pruritis  Hematologic/Lymphatic: easy bruising, lymphadenopathy  Musculoskeletal: arthralgias , myalgias,  back pain, neck pain, knee pain  Neurological: confusion, seizures, tremors, slurred speech  Behavioral/Psych:  depression, anxiety, auditory or visual hallucinations     OBJECTIVE:     Physical Exam:  Body mass index is 21.63 kg/m².    Constitutional: Appears well-developed and well-nourished.   Head: Normocephalic and atraumatic.   Neck: Normal range of motion. Neck supple.   Cardiovascular: Normal heart rate.  Regular heart rhythm.  Pulmonary/Chest: Effort normal.   Abdominal: No distension.  No tenderness   - hole to the Right  side of his scrotum with Serosanguineous drainage   Musculoskeletal: Normal range of motion. No edema.   Neurological: Alert and oriented to person, place, and time.   Skin: Skin is warm and dry.   Psychiatric: Normal mood and affect. Behavior is normal.                  Vital Signs  Temp: 98.1 °F (36.7 °C) (01/26/23 1943)  Pulse: 97 (01/26/23 1943)  Resp: 18 (01/26/23 1943)  BP: (Abnormal) 111/59 (01/26/23 1943)  SpO2: (Abnormal) 92 % (01/26/23 1943)     24 Hour VS Range    Temp:  [97.7 °F (36.5 °C)-98.5 °F (36.9 °C)]   Pulse:  [68-99]   Resp:  [17-20]   BP: (107-134)/(55-76)   SpO2:  [92 %-98 %]   No intake or output data in the 24 hours ending 01/26/23 1951      I/O This Shift:  No intake/output data recorded.    Wt Readings from Last 3 Encounters:   01/22/23 59 kg (129 lb 15.7 oz)   01/14/23 54.3 kg (119 lb 11.4 oz)   01/12/23 50.1 kg (110 lb 7.2 oz)       I have personally reviewed the vitals and recorded Intake/Output     Laboratory/Diagnostic Data:    CBC/Anemia Labs: Coags:    Recent Labs   Lab 01/21/23  0451 01/22/23  0401 01/24/23  0447   WBC 11.39 10.58 4.13   HGB 11.0* 10.0* 10.4*   HCT 34.0* 31.1* 33.5*    190 200   MCV 81* 81* 83   RDW 15.8* 16.1* 16.0*    No results for input(s): PT, INR, APTT in the last 168 hours.     Chemistries: ABG:   Recent Labs   Lab 01/21/23  0451 01/22/23  0401 01/24/23  0447   * 135* 138   K 3.5 3.2* 3.8    103 109   CO2 21*  22* 18*   BUN 12 10 18   CREATININE 0.9 0.8 1.1   CALCIUM 9.0 9.3 9.3   PROT 6.9 6.8 7.0   BILITOT 0.5 1.4* 0.2   ALKPHOS 91 93 86   ALT 13 12 7*   AST 14 14 7*    No results for input(s): PH, PCO2, PO2, HCO3, POCSATURATED, BE in the last 168 hours.     POCT Glucose: HbA1c:    No results for input(s): POCTGLUCOSE in the last 168 hours. No results found for: HGBA1C     Cardiac Enzymes: Ejection Fractions:    No results for input(s): CPK, CPKMB, MB, TROPONINI in the last 72 hours. No results found for: EF       No results for input(s): COLORU, APPEARANCEUA, PHUR, SPECGRAV, PROTEINUA, GLUCUA, KETONESU, BILIRUBINUA, OCCULTUA, NITRITE, UROBILINOGEN, LEUKOCYTESUR, RBCUA, WBCUA, BACTERIA, SQUAMEPITHEL, HYALINECASTS in the last 48 hours.    Invalid input(s): WRIGHTSUR    No results found for: PROCAL, LACTATE  No results found for: BNP  No results found for: CRP, SEDRATE  No results found for: DDIMER  No results found for: FERRITIN  No results found for: LDH  No results found for: TROPONINI, CPK  No results found for this or any previous visit.  POC Rapid COVID (no units)   Date Value   12/03/2021 Negative   12/06/2020 Negative       Microbiology labs for the last week  Microbiology Results (last 7 days)       Procedure Component Value Units Date/Time    Culture, Anaerobe [[id number]] Collected: 01/20/23 0547    Order Status: Completed Specimen: Abscess from Groin Updated: 01/24/23 0742     Anaerobic Culture No anaerobes isolated    Aerobic culture [[id number]] Collected: 01/20/23 0547    Order Status: Completed Specimen: Abscess from Groin Updated: 01/23/23 1051     Aerobic Bacterial Culture Genital sadaf, no predominant organism    Aerobic culture (Specify Source) **CANNOT BE ORDERED AS STAT* [[id number]] Collected: 01/20/23 0550    Order Status: Sent Specimen: Wound from Sacrum Updated: 01/20/23 0551            Reviewed and noted in plan where applicable- Please see chart for full lab data.    Lines/Drains:       Midline  Catheter Insertion/Assessment  - Single Lumen 01/20/23 1201 Right basilic vein (medial side of arm) 18g x 8cm (Active)   Site Assessment Intact;Dry;Clean 01/25/23 1910   IV Device Securement catheter securement device 01/25/23 1910   Line Status Saline locked 01/25/23 1910   Dressing Type Biopatch in place 01/25/23 1910   Dressing Status Intact;Dry;Clean 01/25/23 1910   Dressing Intervention Integrity maintained 01/25/23 1910   Dressing Change Due 01/27/23 01/23/23 1940   Site Change Due 02/18/23 01/23/23 1940   Reason Not Rotated Not due 01/23/23 1940   Number of days: 5       Imaging      No results found for this or any previous visit.      US Scrotum And Testicles  Narrative: EXAMINATION:  US SCROTUM AND TESTICLES    CLINICAL HISTORY:  Other injury of unspecified body region, initial encounter    TECHNIQUE:  Sonography of the scrotum and testes.    COMPARISON:  Scrotal ultrasound 01/07/2023.    FINDINGS:  Right Testicle:    There is interval postoperative change of right orchiectomy in this patient with reported history of testicular torsion.  There is approximately 0.8 cm anechoic focus within the right testicular fossa noting there is adjacent heterogeneous edema/skin thickening and hyperemia.  Additionally there is heterogeneous echogenicity about the reported incision site.  Findings could relate to underlying postoperative change versus soft tissue infection/cellulitis in the appropriate clinical setting with postoperative fluid versus fluid collection noting sterility cannot be assessed on ultrasound.    Left Testicle:    *Size: 4.0 x 2.1 x 2.7 cm  *Appearance: Normal.  *Flow: Normal arterial and venous flow  *Epididymis: Normal.  *Hydrocele: None.  *Varicocele: Questionable left-sided varicocele noting pampiniform plexus veins measure up to 3 mm.  Other findings: None.  Impression: 1. Interval postoperative change of right orchiectomy in this patient with reported history of torsion.  There is a 0.8 cm  anechoic focus in the testicular fossa resection bed with adjacent soft tissue thickening/edema and hyperemia noting heterogeneous echogenicity at the reported incision site.  Findings could relate to underlying soft tissue infection/cellulitis in the appropriate clinical setting there is postoperative change with associated postoperative fluid/fluid collection noting sterility of fluid cannot be assessed on ultrasound.  Correlation with physical exam and appropriate subspecialty consultation advised.  2. Questionable left-sided varicocele with impending formed plexus veins measuring up to 3 mm.  Otherwise unremarkable sonographic evaluation of the left testicle.  This report was flagged in Epic as abnormal.    Electronically signed by resident: Dex Castillo  Date:    01/20/2023  Time:    02:54    Electronically signed by: Zenia Naik MD  Date:    01/20/2023  Time:    03:47  US Lower Extremity Veins Bilateral  Narrative: EXAMINATION:  US LOWER EXTREMITY VEINS BILATERAL    CLINICAL HISTORY:  Other specified soft tissue disorders    TECHNIQUE:  Duplex and color flow Doppler and dynamic compression was performed of the bilateral lower extremity veins was performed.    COMPARISON:  None.    FINDINGS:  Right thigh veins: The common femoral, femoral, popliteal, and upper greater saphenous veins are patent and free of thrombus. The veins are normally compressible and have normal phasic flow and augmentation response.    Right calf veins: The visualized calf veins are patent.    Left thigh veins: The common femoral, femoral, popliteal, and upper greater saphenous veins are patent and free of thrombus. The veins are normally compressible and have normal phasic flow and augmentation response.    Left calf veins: The visualized calf veins are patent.    Miscellaneous: Mild bilateral lower extremity subcutaneous edema.  Enlarged bilateral inguinal lymph nodes measuring 2.1 x 0.9 x 3.0 cm on the left and 3.2 x 0.7 x 1.9 cm on  the right.  Impression: No evidence of deep venous thrombosis in either lower extremity.    Enlarged bilateral inguinal lymph nodes of uncertain etiology/clinical significance.  Clinical correlation is advised.    Electronically signed by resident: Dex Castillo  Date:    01/20/2023  Time:    02:51    Electronically signed by: Zenia Naik MD  Date:    01/20/2023  Time:    03:40      Labs, Imaging, EKG and Diagnostic results from 1/26/2023 were reviewed.    Medications:  Medication list was reviewed and changes noted under Assessment/Plan.  No current facility-administered medications on file prior to encounter.     No current outpatient medications on file prior to encounter.     Scheduled Medications:  acetaminophen, 1,000 mg, Oral, Q8H  nicotine, 1 patch, Transdermal, Daily  polyethylene glycol, 17 g, Oral, Daily  sulfamethoxazole-trimethoprim 800-160mg, 1 tablet, Oral, BID      PRN: albuterol-ipratropium, aluminum-magnesium hydroxide-simethicone, bisacodyL, dextrose 10%, dextrose 10%, glucagon (human recombinant), glucose, glucose, ibuprofen, LORazepam, melatonin, naloxone, ondansetron, oxyCODONE, oxyCODONE, prochlorperazine, simethicone, sodium chloride 0.9%  Infusions:   Estimated Creatinine Clearance: 70 mL/min (based on SCr of 1.1 mg/dL).    Assessment/Plan:      * Testicular abscess  Surgical wound present     - s/p right orchiectomy and right hemiscrotal abscess I&D   - Mildly hypotensive and low grade fever in the ED  - s/p 500 cc IVF   - US of scrotum and testicles with evidence of interval postoperative change of right orchiectomy in this patient with reported history of torsion.  There is a 0.8 cm anechoic focus in the testicular fossa resection bed with adjacent soft tissue thickening/edema and hyperemia noting heterogeneous echogenicity at the reported incision site.  Findings could relate to underlying soft tissue infection/cellulitis in the appropriate clinical setting there is postoperative change  with associated postoperative fluid/fluid collection noting sterility of fluid cannot be assessed on ultrasound.   - Urology consulted in the ED who cleaned and packed wound  - Recommend continuing abx with Bactrim DS for 7 - 10 days and return to LTAC  - Appreciate SW/CM assistance with placement  - Will need urology follow-up  - prn analgesics  1/26 s/p  right orchiectomy and right hemiscrotal abscess I&D. No urologic interventions  Wound packed with iodoform gauze following washout with iodine and NS, no areas requiring debridement . 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 continue PO Bactrim DS for 7-10 days . F/u with Urology clinic in 2-3 weeks. wound care following.     SIRS (systemic inflammatory response syndrome)  This patient does have evidence of infective focus  My overall impression is sepsis. Vital signs were reviewed and noted in progress note.  Antibiotics given-   Antibiotics (From admission, onward)      Start     Stop Route Frequency Ordered    01/21/23 0915  sulfamethoxazole-trimethoprim 800-160mg per tablet 1 tablet         -- Oral 2 times daily 01/21/23 0804          Cultures were taken-   Microbiology Results (last 7 days)       Procedure Component Value Units Date/Time    Culture, Anaerobe [[id number]] Collected: 01/20/23 0547    Order Status: Completed Specimen: Abscess from Groin Updated: 01/24/23 0742     Anaerobic Culture No anaerobes isolated    Aerobic culture [[id number]] Collected: 01/20/23 0547    Order Status: Completed Specimen: Abscess from Groin Updated: 01/23/23 1051     Aerobic Bacterial Culture Genital sadaf, no predominant organism    Aerobic culture (Specify Source) **CANNOT BE ORDERED AS STAT* [[id number]] Collected: 01/20/23 0550    Order Status: Sent Specimen: Wound from Sacrum Updated: 01/20/23 0551          Latest lactate reviewed, they are-  No results for input(s): LACTATE in the last 72 hours.    Organ dysfunction indicated by   Source - Testicular  abscess    Source control Achieved by- drainage of pus from wound, wound care    - Continue antibiotics as above  - Patient with persistent hypotension and poor oral intake   - Maintenance IV fluids    Surgical wound present  - See above  1/2Urology consulted for hole to the Right  side of his scrotum not present during previous admit. wound care following.    Opioid use disorder, severe, dependence  - Reports last heroin use prior to arrival  - PRNn ativan for withdrawal symptoms  - PRN anti-emetics  - No s/s of withdrawal noted   1/26UTox + for methadone, opiates and marijuana.        Anemia of infection and chronic disease  - patient's anemia is currently controlled.  - current CBC reviewed -   Lab Results   Component Value Date    HGB 10.4 (L) 01/24/2023    HCT 33.5 (L) 01/24/2023     - Monitor serial CBC and transfuse if patient becomes hemodynamically unstable, symptomatic, or H/H drops below 7/21.         Dependence on nicotine from cigarettes  - Nicotine patch        VTE Risk Mitigation (From admission, onward)           Ordered     IP VTE HIGH RISK PATIENT  Once         01/20/23 1550     Reason for No Pharmacological VTE Prophylaxis  Once        Question:  Reasons:  Answer:  Risk of Bleeding    01/20/23 1550                    Discharge Planning   CEDRIC: 1/27/2023     Code Status: Full Code   Is the patient medically ready for discharge?: No    Reason for patient still in hospital (select all that apply): Treatment  Discharge Plan A: Home   Discharge Delays: (Abnormal) Post-Acute Set-up              Ryan Alcazar MD  Department of Hospital Medicine   [facility] - Intensive Care ([place]-[facility])

## 2023-01-26 NOTE — PLAN OF CARE
Messaged MD to request: this patient will be d/c'd with wound care - trying to set up with [facility] for this - would you please put in orders for nurse to do wound care teaching? We are trying to set up with [facility] wound care, but they won't do wound care 7D/wk.  Calling [facility] [phone] to set up wound care.  LVM requesting call back.    3:50 PM Calling [facility] [phone] to set up medical shelter bed for this patient with infected wound.  LVM requesting call back.    Calling [facility] [phone] to set up post-hospital wound care. Scottie answered, put CM on hold - they never answered.    4:13 PM Rec'd call from Jo at [facility] wound care center everything goes through Epic Referral to ambulatory wound care; MD has to do it.  Then [facility]'s MD's will look at it and review.  Requested hospital medicine to send referral for wound care to [facility].    4:27 PM Calling [facility] [phone] to ask for Marek to set up medical shelter bed for this patient with infected wound. Marek asked when d/c is, asked for updated Covid test; needs to be there by 3:30.  If he's not d/c'd tomorrow he can come any day as long as he gets there before 3:30.      TED DamonN, BS, RN, CCM

## 2023-01-26 NOTE — CONSULTS
[facility] ([place] [address])  Urology  Consult Note    Patient Name: Endy Bruno  MRN: [id number]  Admission Date: 1/20/2023  Hospital Length of Stay: 2   Code Status: Full Code   Attending Provider: Ryan Alcazar MD   Consulting Provider: aDndy Ro MD  Primary Care Physician: Primary Doctor No  Principal Problem:Testicular abscess    Inpatient consult to Urology  Consult performed by: Dandy Ro MD  Consult ordered by: Ryan Alcazar MD  Reason for consult: Scrotal Abscess        Subjective:     HPI:  Mr. Bruno is a 47-year-old male with past medical history of heroin abuse, testicular torsion status post orchiectomy with scrotal abscess I/D (1/7/23) admitted since 1/20/23 for scrotal infection. Urology consulted for possible change in wound.    Patient previously discharged to LTAC for IV abx and wound care for his open scrotal wound. He left AMA after 2 days because he was not allowed visitors on 1/13/22. He was without antibiotics or medical care for 2 days. He used IV heroin on the second day. He noticed pus and presented to the ED. He denies fevers, chills, n/v/d, diaphoresis, hematuria and dysuria.     Upon assessment, AFVSS resting comfortably.     Scrotal US shows normal left testicle with good blood flow and signs of cellulitis and hyperemia within the right yaya-scrotum without a drainable abscess cavity.      No past medical history on file.    Past Surgical History:   Procedure Laterality Date    INCISION AND DRAINAGE OF ABSCESS Right 1/7/2023    Procedure: INCISION AND DRAINAGE, ABSCESS;  Surgeon: Liyah Shetty MD;  Location: [address];  Service: Urology;  Laterality: Right;    ORCHIECTOMY Right 1/7/2023    Procedure: ORCHIECTOMY;  Surgeon: Liyah Shetty MD;  Location: [facility] OR [address] [place] [address];  Service: Urology;  Laterality: Right;    SCROTUM EXPLORATION Right 1/7/2023    Procedure: EXPLORATION, SCROTUM;  Surgeon: Liyah Shetty MD;  Location: [facility]  OR 2ND FLR;  Service: Urology;  Laterality: Right;       Review of patient's allergies indicates:   Allergen Reactions    Penicillins Anaphylaxis    Aspirin Rash     In childhood. Rash and bruising. Tolerates other NSAIDs including ibuprofen       Family History    None         Tobacco Use    Smoking status: Every Day     Packs/day: 1.00     Types: Cigarettes    Smokeless tobacco: Never   Substance and Sexual Activity    Alcohol use: No    Drug use: Yes     Types: IV     Comment: fentanyl IV daily    Sexual activity: Not Currently       Review of Systems   Constitutional:  Negative for chills, fatigue and fever.   HENT: Negative.     Respiratory: Negative.     Cardiovascular: Negative.    Gastrointestinal:  Negative for constipation, nausea and vomiting.   Genitourinary:  Positive for scrotal swelling and testicular pain. Negative for dysuria, flank pain and hematuria.   Musculoskeletal:  Negative for arthralgias and back pain.   Skin:  Negative for color change and pallor.   Neurological: Negative.  Negative for seizures, speech difficulty and headaches.   Psychiatric/Behavioral:  Negative for agitation and confusion.      Objective:     Temp:  [97.5 °F (36.4 °C)-98.5 °F (36.9 °C)] 98.5 °F (36.9 °C)  Pulse:  [83-97] 93  Resp:  [17-20] 20  SpO2:  [92 %-98 %] 93 %  BP: (107-134)/(55-76) 134/76     Body mass index is 21.63 kg/m².           Drains       None                   Physical Exam  Vitals and nursing note reviewed.   Constitutional:       Appearance: Normal appearance.   HENT:      Head: Atraumatic.      Nose: Nose normal.   Eyes:      Extraocular Movements: Extraocular movements intact.      Pupils: Pupils are equal, round, and reactive to light.   Cardiovascular:      Rate and Rhythm: Normal rate.   Pulmonary:      Effort: Pulmonary effort is normal.   Abdominal:      General: Abdomen is flat. There is no distension.      Tenderness: There is no abdominal tenderness. There is no right CVA tenderness  or left CVA tenderness.   Genitourinary:     Comments: Previous open midline scrotal incision without surrounding necrosis or erythema. No crepitus or fluctuance. Minimal induration surrounding edges. No areas of necrosis, fluctuance or crepitus palpated within the wound cavity within the right-hemiscrotum. Minimal erythema and tenderness throughout the right yaya-scrotum.  Newly appreciated open draining tract on right lateral scrotum skin.  Musculoskeletal:         General: Normal range of motion.      Cervical back: Normal range of motion.   Skin:     Coloration: Skin is not jaundiced.   Neurological:      General: No focal deficit present.      Mental Status: He is alert and oriented to person, place, and time.   Psychiatric:         Mood and Affect: Mood normal.         Behavior: Behavior normal.       Significant Labs:    BMP:  Recent Labs   Lab 01/21/23  0451 01/22/23  0401 01/24/23  0447   * 135* 138   K 3.5 3.2* 3.8    103 109   CO2 21* 22* 18*   BUN 12 10 18   CREATININE 0.9 0.8 1.1   CALCIUM 9.0 9.3 9.3       CBC:  Recent Labs   Lab 01/21/23  0451 01/22/23  0401 01/24/23  0447   WBC 11.39 10.58 4.13   HGB 11.0* 10.0* 10.4*   HCT 34.0* 31.1* 33.5*    190 200       Urine Studies:   Recent Labs   Lab 01/20/23  0345   COLORU Yellow   APPEARANCEUA Clear   PHUR 7.0   SPECGRAV 1.015   PROTEINUA Negative   GLUCUA Negative   KETONESU Negative   BILIRUBINUA Negative   OCCULTUA Negative   NITRITE Negative   LEUKOCYTESUR Negative       Significant Imaging:  All pertinent imaging results/findings from the past 24 hours have been reviewed.        Assessment and Plan:     * Testicular abscess  Endy Bruno is a 47 y.o. male with prior right orchiectomy and right hemiscrotal abscess I/D who presents to the ED with worsening pain and and pus from his open wound.    - No indications for urologic interventions at this time  - Wound being assessed by wound care, appreciated assistance   - Continue to  recommend return to LTAC for continued abx and wound care versus hospital admission until appropriate outpt wound care is set up  - Antibiotics per primary  - Patient to be [facility] at [facility]  - Rest of care per primary          VTE Risk Mitigation (From admission, onward)         Ordered     IP VTE HIGH RISK PATIENT  Once         01/20/23 1550     Reason for No Pharmacological VTE Prophylaxis  Once        Question:  Reasons:  Answer:  Risk of Bleeding    01/20/23 1550                Thank you for your consult. I will sign off. Please contact us if you have any additional questions.    Dandy Ro MD  Urology  [facility] ([facility]

## 2023-01-26 NOTE — ASSESSMENT & PLAN NOTE
This patient does have evidence of infective focus  My overall impression is sepsis. Vital signs were reviewed and noted in progress note.  Antibiotics given-   Antibiotics (From admission, onward)    Start     Stop Route Frequency Ordered    01/21/23 0915  sulfamethoxazole-trimethoprim 800-160mg per tablet 1 tablet         -- Oral 2 times daily 01/21/23 0804        Cultures were taken-   Microbiology Results (last 7 days)     Procedure Component Value Units Date/Time    Culture, Anaerobe [[id number]] Collected: 01/20/23 0547    Order Status: Completed Specimen: Abscess from Groin Updated: 01/24/23 0742     Anaerobic Culture No anaerobes isolated    Aerobic culture [[id number]] Collected: 01/20/23 0547    Order Status: Completed Specimen: Abscess from Groin Updated: 01/23/23 1051     Aerobic Bacterial Culture Genital sadaf, no predominant organism    Aerobic culture (Specify Source) **CANNOT BE ORDERED AS STAT* [[id number]] Collected: 01/20/23 0550    Order Status: Sent Specimen: Wound from Sacrum Updated: 01/20/23 0551        Latest lactate reviewed, they are-  No results for input(s): LACTATE in the last 72 hours.    Organ dysfunction indicated by   Source - Testicular abscess    Source control Achieved by- drainage of pus from wound, wound care    - Continue antibiotics as above  - Patient with persistent hypotension and poor oral intake   - Maintenance IV fluids

## 2023-01-26 NOTE — SUBJECTIVE & OBJECTIVE
No past medical history on file.    Past Surgical History:   Procedure Laterality Date    INCISION AND DRAINAGE OF ABSCESS Right 1/7/2023    Procedure: INCISION AND DRAINAGE, ABSCESS;  Surgeon: Liyah Shetty MD;  Location: [address];  Service: Urology;  Laterality: Right;    ORCHIECTOMY Right 1/7/2023    Procedure: ORCHIECTOMY;  Surgeon: Liyah Shetty MD;  Location: [address];  Service: Urology;  Laterality: Right;    SCROTUM EXPLORATION Right 1/7/2023    Procedure: EXPLORATION, SCROTUM;  Surgeon: Liyah Shetty MD;  Location: [address];  Service: Urology;  Laterality: Right;       Review of patient's allergies indicates:   Allergen Reactions    Penicillins Anaphylaxis    Aspirin Rash     In childhood. Rash and bruising. Tolerates other NSAIDs including ibuprofen       Family History    None         Tobacco Use    Smoking status: Every Day     Packs/day: 1.00     Types: Cigarettes    Smokeless tobacco: Never   Substance and Sexual Activity    Alcohol use: No    Drug use: Yes     Types: IV     Comment: fentanyl IV daily    Sexual activity: Not Currently       Review of Systems   Constitutional:  Negative for chills, fatigue and fever.   HENT: Negative.     Respiratory: Negative.     Cardiovascular: Negative.    Gastrointestinal:  Negative for constipation, nausea and vomiting.   Genitourinary:  Positive for scrotal swelling and testicular pain. Negative for dysuria, flank pain and hematuria.   Musculoskeletal:  Negative for arthralgias and back pain.   Skin:  Negative for color change and pallor.   Neurological: Negative.  Negative for seizures, speech difficulty and headaches.   Psychiatric/Behavioral:  Negative for agitation and confusion.      Objective:     Temp:  [97.5 °F (36.4 °C)-98.5 °F (36.9 °C)] 98.5 °F (36.9 °C)  Pulse:  [83-97] 93  Resp:  [17-20] 20  SpO2:  [92 %-98 %] 93 %  BP: (107-134)/(55-76) 134/76     Body mass index is 21.63 kg/m².           Drains       None                    Physical Exam  Vitals and nursing note reviewed.   Constitutional:       Appearance: Normal appearance.   HENT:      Head: Atraumatic.      Nose: Nose normal.   Eyes:      Extraocular Movements: Extraocular movements intact.      Pupils: Pupils are equal, round, and reactive to light.   Cardiovascular:      Rate and Rhythm: Normal rate.   Pulmonary:      Effort: Pulmonary effort is normal.   Abdominal:      General: Abdomen is flat. There is no distension.      Tenderness: There is no abdominal tenderness. There is no right CVA tenderness or left CVA tenderness.   Genitourinary:     Comments: Previous open midline scrotal incision without surrounding necrosis or erythema. No crepitus or fluctuance. Minimal induration surrounding edges. No areas of necrosis, fluctuance or crepitus palpated within the wound cavity within the right-hemiscrotum. Minimal erythema and tenderness throughout the right yaya-scrotum.  Newly appreciated open draining tract on right lateral scrotum skin.  Musculoskeletal:         General: Normal range of motion.      Cervical back: Normal range of motion.   Skin:     Coloration: Skin is not jaundiced.   Neurological:      General: No focal deficit present.      Mental Status: He is alert and oriented to person, place, and time.   Psychiatric:         Mood and Affect: Mood normal.         Behavior: Behavior normal.       Significant Labs:    BMP:  Recent Labs   Lab 01/21/23  0451 01/22/23  0401 01/24/23  0447   * 135* 138   K 3.5 3.2* 3.8    103 109   CO2 21* 22* 18*   BUN 12 10 18   CREATININE 0.9 0.8 1.1   CALCIUM 9.0 9.3 9.3       CBC:  Recent Labs   Lab 01/21/23  0451 01/22/23  0401 01/24/23  0447   WBC 11.39 10.58 4.13   HGB 11.0* 10.0* 10.4*   HCT 34.0* 31.1* 33.5*    190 200       Urine Studies:   Recent Labs   Lab 01/20/23  0345   COLORU Yellow   APPEARANCEUA Clear   PHUR 7.0   SPECGRAV 1.015   PROTEINUA Negative   GLUCUA Negative   KETONESU Negative    BILIRUBINUA Negative   OCCULTUA Negative   NITRITE Negative   LEUKOCYTESUR Negative       Significant Imaging:  All pertinent imaging results/findings from the past 24 hours have been reviewed.

## 2023-01-26 NOTE — ASSESSMENT & PLAN NOTE
- patient's anemia is currently controlled.  - current CBC reviewed -   Lab Results   Component Value Date    HGB 10.4 (L) 01/24/2023    HCT 33.5 (L) 01/24/2023     - Monitor serial CBC and transfuse if patient becomes hemodynamically unstable, symptomatic, or H/H drops below 7/21.

## 2023-01-27 PROBLEM — F12.20 CANNABIS USE DISORDER, MODERATE, DEPENDENCE: Chronic | Status: ACTIVE | Noted: 2023-01-27

## 2023-01-27 PROBLEM — F17.200 TOBACCO USE DISORDER: Status: ACTIVE | Noted: 2023-01-27

## 2023-01-27 PROBLEM — F19.90 IVDU (INTRAVENOUS DRUG USER): Status: ACTIVE | Noted: 2023-01-27

## 2023-01-27 PROBLEM — F15.20 AMPHETAMINE USE DISORDER, SEVERE: Chronic | Status: ACTIVE | Noted: 2023-01-27

## 2023-01-27 PROBLEM — L08.9 WOUND INFECTION: Status: ACTIVE | Noted: 2023-01-14

## 2023-01-27 PROCEDURE — 99222 1ST HOSP IP/OBS MODERATE 55: CPT | Mod: ,,, | Performed by: PSYCHIATRY & NEUROLOGY

## 2023-01-27 PROCEDURE — 25000003 PHARM REV CODE 250

## 2023-01-27 PROCEDURE — 99222 PR INITIAL HOSPITAL CARE,LEVL II: ICD-10-PCS | Mod: ,,, | Performed by: PSYCHIATRY & NEUROLOGY

## 2023-01-27 PROCEDURE — 12000002 HC ACUTE/MED SURGE SEMI-PRIVATE ROOM

## 2023-01-27 PROCEDURE — 99233 PR SUBSEQUENT HOSPITAL CARE,LEVL III: ICD-10-PCS | Mod: ,,, | Performed by: HOSPITALIST

## 2023-01-27 PROCEDURE — 25000003 PHARM REV CODE 250: Performed by: HOSPITALIST

## 2023-01-27 PROCEDURE — 99233 SBSQ HOSP IP/OBS HIGH 50: CPT | Mod: ,,, | Performed by: HOSPITALIST

## 2023-01-27 PROCEDURE — S4991 NICOTINE PATCH NONLEGEND: HCPCS

## 2023-01-27 PROCEDURE — 25000003 PHARM REV CODE 250: Performed by: STUDENT IN AN ORGANIZED HEALTH CARE EDUCATION/TRAINING PROGRAM

## 2023-01-27 RX ORDER — LOPERAMIDE HYDROCHLORIDE 2 MG/1
2 CAPSULE ORAL
Status: DISCONTINUED | OUTPATIENT
Start: 2023-01-27 | End: 2023-01-28 | Stop reason: HOSPADM

## 2023-01-27 RX ORDER — DICYCLOMINE HYDROCHLORIDE 10 MG/1
10 CAPSULE ORAL EVERY 6 HOURS PRN
Status: DISCONTINUED | OUTPATIENT
Start: 2023-01-27 | End: 2023-01-28 | Stop reason: HOSPADM

## 2023-01-27 RX ORDER — CELECOXIB 100 MG/1
200 CAPSULE ORAL DAILY
Status: DISCONTINUED | OUTPATIENT
Start: 2023-01-27 | End: 2023-01-28 | Stop reason: HOSPADM

## 2023-01-27 RX ORDER — METHOCARBAMOL 500 MG/1
500 TABLET, FILM COATED ORAL 4 TIMES DAILY
Status: DISCONTINUED | OUTPATIENT
Start: 2023-01-27 | End: 2023-01-28 | Stop reason: HOSPADM

## 2023-01-27 RX ADMIN — SULFAMETHOXAZOLE AND TRIMETHOPRIM 1 TABLET: 800; 160 TABLET ORAL at 09:01

## 2023-01-27 RX ADMIN — ACETAMINOPHEN 1000 MG: 500 TABLET ORAL at 04:01

## 2023-01-27 RX ADMIN — NICOTINE 1 PATCH: 14 PATCH, EXTENDED RELEASE TRANSDERMAL at 09:01

## 2023-01-27 RX ADMIN — ACETAMINOPHEN 1000 MG: 500 TABLET ORAL at 09:01

## 2023-01-27 RX ADMIN — CELECOXIB 200 MG: 100 CAPSULE ORAL at 04:01

## 2023-01-27 RX ADMIN — METHOCARBAMOL TABLETS 500 MG: 500 TABLET, COATED ORAL at 04:01

## 2023-01-27 RX ADMIN — OXYCODONE HYDROCHLORIDE 10 MG: 10 TABLET ORAL at 09:01

## 2023-01-27 RX ADMIN — METHOCARBAMOL TABLETS 500 MG: 500 TABLET, COATED ORAL at 09:01

## 2023-01-27 NOTE — DISCHARGE INSTRUCTIONS
REFERRAL RECOMMENDATIONS FOR SUBSTANCE ABUSE & MENTAL HEALTH      IN CASE OF SUICIDAL THINKING, call the National Suicide Hotline Number: 988    988 Suicide & Crisis Lifeline: 984 , 6-368-100-SXAZ (4597)  https://988[employer].Interse       SUBSTANCE ABUSE:     [facility] (formerly known as the ABU)  [x] [phone], Option 2  [x] [address] [facility] 4th [facility], [place] [address]  [x] [email or web address]  [x] The [facility] delivers comprehensive and collaborative treatment for alcohol and substance use disorders.  Excellent program for working professionals or anyone else seeking recovery.  [x] Requires insurance approval prior to starting program, call number above for more information.  [x] Intensive Outpatient Rehabilitation Program - M-F 9am-3pm - daily groups with psychologists and social workers, sessions with MDs 3x per week   [x] Ambulatory detox and dual diagnosis available      SUBOXONE:     NOTE: some Suboxone clinics require their clients to participate in a structured program (such as an IOP) in order to be prescribed Suboxone.  Some clinics have a long waiting list.  Most of these clinics do not accept walk-in clients, so call first to to learn what must be done to get started on Suboxone.    [facility] - [phone] (can do Sublocade)  [address] [facility], [place] [address]    [address]  [phone]    [facility] - [phone] (can do Sublocade)  [address] [place] [address]    [facility]  [address] [place]  [phone]     [facility] (very short waiting list, may accept some walk-in's but call first if possible)  [address], [place] [address]  [phone]; [phone]    [facility]   [address] [place]    [phone]    [facility] (can usually be seen within a week but is cash only  for appointment)  [address] State mental health facility ([facility])  [address] [place], [place]  [phone]    [facility] ([facility])  [address] [facility] [address]  [phone]    [place], [place]:    [facility] - [address] [place] [address] - Tel: [phone]    Kevin Uma - [address] [place] [place] [address] - Tel: [phone]    Bao Buckner - [address] [place] [address] - Tel: [phone]    Guillermo Christina - [address] [employer] [place] [address] - Tel: [phone]    Craig Gimenez - [address] [place] [address] - Tel: [phone]    [place], [place]:     Dr. Dorene Yee and Dr. Carson Narayan - [address] [place], [place] - Tel: [phone]    Dr. Krista Mcgee - [address] [facility] [place], [place] - Tel: [phone]    Dr. Francesco Calero - Tel: [phone]    Dr. Cm Raymond - [facility] - [phone]      METHADONE:     [facility] (the only methadone clinic in the [facility], has two locations)  [x] [place] - [address] [place] [address], [phone]  [x] [facility] [place] [address], [phone]      12 STEP PROGRAMS (and similar):     Alcoholics Anonymous (local)  [x] [phone]  [x] www.[place].org for schedules for in-person and online meetings  [x] There are AA meetings throughout the day all over [facility]  [x] AA costs nothing to attend; they pass a basket for donations but this is not required    Narcotics Anonymous  [x] [phone]  [x] www.noana.org  [x] There are NA meetings throughout the day all over town  [x] NA costs nothing to attend; they pass a basket for donations but this is not required    Alcoholics Anonymous Online Intergroup (national)  [x] www.aa-intergroup.org  [x] Good resource for large, nation-wide meetings  [x] Can also attend smaller, local meetings in other cities  [x] Countless meetings all day and all night  [x] AA costs nothing to attend; they pass a basket for donations  but this is not required    LOOKING FOR AN ALTERNATIVE TO 12 STEP PROGRAMS - check out:  SMART Recovery: https://www.smartrecovery.org/about-us  Dieter Recovery: https://recoverydharma.org      DETOX UNITS (USUALLY 5-7 DAYS):     [facility]: [address], [facility]  [phone], call first to ensure bed availability    [facility]: [address] [facility]  [phone], Option 1, call first to ensure bed availability    [facility]: [address] , [facility]  [phone] (intake by appointment only)    [facility]: [address], [facility]  [phone]      INTENSIVE OUTPATIENT PROGRAMS:     [facility] (formerly known as the ABU)  [x] [phone], Option 2  [x] [address] [facility] 4th Floor, [facility] [address]  [x] [email or web address]://[email or web address]/services/[facility]recovery-[facility]  [x] The [facility] delivers comprehensive and collaborative treatment for alcohol and substance use disorders.  Excellent program for working professionals or anyone else seeking recovery.  [x] Requires insurance approval prior to starting program, call number above for more information.  [x] Intensive Outpatient Rehabilitation Program - M-F 9am-3pm - daily groups with psychologists and social workers, sessions with MDs 3x per week   [x] Ambulatory detox and dual diagnosis available    [facility] Intensive Outpatient Program  [x] [phone]  [x] [address] [facility] (the clinic not [facility])  [x] Call number above for more info and to check insurance requirements    [facility]  [address] [place], [place] [address] [phone]    [facility]:  [address] [facility], [address] [place], [place] [address]?, [phone]  [address] [facility]?, [place], [place] [address]?, [phone]    RESIDENTIAL REHABS (USUALLY 28 DAYS):     [facility]: [address] [facility] [phone]    [facility]:  [address] [place]  [phone] (intake by appointment only)    [facility] (men only) [address] [place] [address], [phone]    [facility] (Female only) [address] [place] [address], [phone]    [facility]: [address], [place], men's program [phone], women's program [phone]    [facility]: [address] [place], [phone]    [facility]: [address] [place], [place], [phone]    [facility]: Men only, [phone], [address] [place]    [facility]: [address], [place], [place], [phone]    [facility]: [address] [place] [address],  [phone]  New Location: [address] [place] [address] [address], [place] [address], [phone]    [facility]:   ?[address]?[place], [place] [address]?[phone]    [place]: [address] [place] [address], [phone]    [place]: [place], [place], [phone]     [facility]: [place], [place], [phone]    [facility]: [place], [place], [phone]    [facility]: [place], [place], [phone]    [place]: [place], [place], [phone]    [facility]: [address] [facility], [place] [address], [phone]    COMMUNITY ADDICTION CLINICS:     [facility]: [address] [facility], [address] [place], [phone] -or- [address] [place] [address]    [facility] [place]: [address], [place]  [address]     [facility]: Clinics [phone]; Crisis [phone]    [facility]: [address] [place] [address]    [facility]: [address] [place] [address]    [facility]: [address] [place] [address],    [facility]: 2nd Floor [address] [place] [address]    [place] [facility]: [address], [facility] [place] [address]    [place]  [facility], [address], [place] [address]    [facility]: [address] [facility], [place] [phone]  (serves youth 16-23 years old)    [facility]: [facility]. [place] [phone]    [facility]: [address] [facility], [place] [phone]    [facility]: [address], [place] [phone]    [facility]: [address] [facility], [address], [phone]     [facility]: [address] [facility] [place], [phone], [phone]    RESOURCES IN OTHER [facility]:     [facility]: [address] [place], [phone], [phone]    [facility]: [address] [facility], [address], [phone]    [facility], [address] [place] [address], [phone]    [facility]: [address] [facility], [phone]    [facility], [address] [facility], [phone] ([facility])    [facility], [address] [facility], [phone] ([facility])    [facility], [address] [facility], [address], [place], [phone] ([place] [place])    [facility], [address] , [place], [phone] ([facility])    [facility] [place] Hotline [phone], [phone]     [facility], [address] [facility], [facility], [address] [facility] [address], [facility] Center, [address] [facility], [facility], 500 [facility] B., [facility], [address], [place]    [facility], [address], [place] [phone]    [facility]  "[facility], [address] [facility] [phone]    [facility], [address] [facility], [phone]    [facility], [address] [facility], [phone]      Croatian SPEAKING (en español):     Información de la reunión de Alcohólicos Anónimos  [facility], 10:00 am  Habla español  Esta reunión está abierta y cualquiera puede asistir.    Ugandan speaking Alcoholics anonymous meetings:  El "Kristian Hines AA Skype" es un kristian on line de Alcohólicos Anónimos en [facility]. El kristian es anali, gratuito y virtual a través de Skype Audio. El kristian funciona mediante ladonna llamada grupal de voz, por lo que no se utiliza la videollamada, ni se pueden satinder las imágenes o rostros de los participantes. Hace patrick años y medio abrimos el primer Kristian de AA por Skype en [place], nba actualmente asisten personas desde [place], [place], [place], [place], [place], [place], [place], [place], [place], [place], [place] y [place], entre otros.    El kristian es muy útil para los alcohólicos que residen en lugares donde no se celebran reuniones de AA, o residen en lugares donde las reuniones de AA son un número limitado de días a la semana, o para aquellos compañeros que se hayan de viaje o que, por cualquier motivo, se hayan convalecientes y no pueden desplazarse. Todos los días nos reuniones a las 21:00 (hora española)    Podéis obtener más información sobre el kristian y olga sesiones en la página web [email or web address]      MENTAL HEALTH:     [facility] - Outpatient Clinic  [phone]    [facility] - General Psychiatry Intensive Outpatient Program  [facility] ([facility] known as the [facility])  [phone], option 3    [facility] - Dual Diagnosis Intensive Outpatient Program  [facility] (formerly " known as the [facility])  [phone], option 2      COMMUNITY MENTAL HEALTH CENTERS:     [facility]  (aka [facility], [facility])  Serves [facility] and [facility] residents.  Serves uninsured patients & those with Medicaid.  Main location: [address] [place], [place] [address] [phone]  Walk-in's available during regular business hours.  24/7 Crisis Line: [phone]    [facility]  (aka [facility], [facility])  Serves [facility].  Serves uninsured patients, those with Medicaid and some private plans.  Walk-in's available during regular business hours.  Primary care services available as well.  [facility]: [address] [facility] [place] [address];  [phone]  [place]: [address] [place] [address];  [phone]  24/7 Crisis Line: [phone]    [facility]  Serves uninsured patients & those with Medicaid, call for more info.  Primary care, pediatrics, HIV treatment, and dentistry services available as well.  Three locations.  [phone]    [facility]Corporate Office  Serves patients with Medicaid, Medicare, and private insurance  [address]  [place],?[place] [address] [phone]    [facility]  Serves uninsured on a sliding scale, as well as Medicaid, Medicare, and private plans.  Eight locations around the [facility] area.  [phone]    [facility]  Serves uninsured patients & those with Medicaid, private insurances.  Primary care available as well.  [phone]  [facility] [place] [address]    [facility]  Serves veterans who were honorably discharged.  [address] [place] [address]  [phone]  24/7 Veterans Crisis Line: 1-719.157.4759 (Press 1)    If you have private insurance and need to find a  specialist, please contact your insurance network to request a list of providers covered by your benefits.      MENTAL HEALTH/ADDICTIVE DISORDERS:     AA ([phone]), NA ([phone])   National Suicide Prevention Lifeline- Call 1-194.467.1172 Available 24 hours everyday  [facility] 973-7222; Crisis Line 620-9091 - Call for options A-F:  Intensive Outpatient Treatment/ Day programs   [facility], please contact   [facility], please contact [phone] or [phone] to speak with an admissions counselor.  [facility] (Methadone Maintenance)   [address] [place] [address], [phone]  [address], [place] [address] [phone]  [facility], [address] [address], [phone]  [facility] Addiction Treatment [facility] [phone]  [facility] please contact [phone]  [facility], [address] [facility], 4th [place] [address] Phone: [phone]   [facility]  [place] Office: [address] [address], [phone]  [place] Office: [address] [place] [address] B, [place] [address], [phone]  [place] Office: [address] [place] [address] [phone]    Outpatient Substance Abuse Treatment   [facility] (Methadone Maintenance)   [address] [place] [address], [phone]  [address], [place] [address] [phone]  [facility], [address] [address], [phone]  [facility]  [place] Office: [address] [address], [phone]  [place] Office: Critical access hospital[address] [facility], [address], [place] [address], [phone]  [place] Office: [address] [place] [address] [phone]  [facility], [address] [place] [address] [phone]   [facility], [address] [facility], [address], [phone]  [place]  [facility], [address] [place], [place] [phone]    Residential Substance Abuse Treatment   [facility] [address] [facility], [phone]  [facility], [address] [facility], [phone]  [facility] (men only) [address] [place] [address], [phone]  Women at the [facility] (women only) [address] [place] [address] [phone]  [facility], [address] [place] [address] [phone]  [facility] (women only), intakes at [address] [facility], [phone]  Responsibility [place] (7-day program, $100, [address], [phone], [phone], [phone])  [facility] (Men only, [phone]), [address], [place] (Vets*/Non-Vets)  [facility] (Men only, $400/month program fee) [address] [facility], [phone]  [facility] (Women over age 39 only), [address] [facility], [phone]    Out of Area:    [place], [address] [place] [address], [place] ([phone])  [facility] (men only), [address] [place] [address], [phone]  [facility], [address] [place], [place] ([phone])  [place], [facility] [place], [place] ([phone])  [facility], [address] [facility], [phone]  [facility] (Women only, has to have mental illness, can be homeless or substance abuser), [phone]        DOMESTIC VIOLENCE RESOURCES:     Advocacy  [facility] ([facility])  [address] [place] [address]    NOFJC ? [phone]  Services provided: emergency shelter, individual advocacy, information and referrals, group support, children's program, medical advocacy, forensic medical exams, primary care, legal assistance, counseling, safety planning, and caregiver support    [facility] HEALING AND EMPOWERMENT [place]  Confidential location  [facility] ? [phone]  Services provided: short term emergency shelter, all services provided are free of charge    [facility] FOR COMMUNITY ADVOCACY  Multiple locations in [facility], [place], [facility], [place], and [facility] ([place], [place], and [place])    [place] ? [phone]  Services provided: emergency shelter, individual advocacy, information and referrals, group support, children's program, medical advocacy, legal assistance in obtaining restraining orders, counseling, safety planning, and caregiver support    [facility]   Emergency Shelter   [phone]  Emergency Services ,Legal and Financial Assistance Services ,Housing Services ,Support Services     [place] [facility]   [phone]  Emergency Services ,Counseling Services , Housing Services ,Support Services ,Children's Services     WOMEN WITH A VISION  [address] [place] [address]  WWAV ? [phone]  Services provided: advocacy, health education and supportive services, specializing in free healing services for marginalized groups, including LGBTQ individuals and sex workers    SEXUAL TRAUMA AWARENESS AND RESPONSE (STAR)  [address] [place] [address]    STAR ? (266) 425-STAR  Services provided: individual advocacy, information and referrals, group support, medical advocacy, legal assistance, counseling, and safety planning for survivors of sexual assault    [facility] ([facility])  [address] [place] [address]  [facility] Forensic Program ? [phone]  Services provided: free forensic medical exams for sexual assault and domestic violence, which can be performed up to 5 days after an incident. It is not necessary to make a police report to receive a forensic medical exam    Legal  [facility]  [address] [address]  Project SAVE ? [phone]  Services provided: free emergency legal representation for survivors of doemstic  violence residing in [facility]. Legal services may include temporary restraining orders, temporary child support, custody, and use of property    [facility] LEGAL SERVICES (SLLS)  [address], [place] [address]  SLLS ? [phone]  Services provided: free legal representation for survivors of domestic violence residing in [facility]. Legal services may include temporary child support, custody, and divorce      HOTLINES:     [facility] DOMESTIC VIOLENCE HOTLINE  [phone]    Services provided: free and confidential hotline for victims and survivors of domestic violence. All calls will be routed to a domestic violence service provided in the victim or survivor's area    NATIONAL HUMAN TRAFFICKING HOTLINE  (633) 827-3617    Services provided: national anti-trafficking hotline serving victims and survivors of human trafficking. Provides information about local resources, and access to safe space to report tips, seek services, and ask for help    VIA LINK  211 or [phone]) [phone]    Service provided: counselors can provide crisis counseling. Counselors can also provide information and referrals to programs which can help with needs such as food, shelter, medical care, financial assistance, mental health services, substance abuse treatment, senior services, childcare, and more      HOMELESS SHELTERS:      Homeless shelters  [facility]  Emergency shelter for individuals and families  [address]  [phone]  [facility]  Emergency shelter for men only  Meals daily 6am, 2pm, & 6pm  Clothing, case management M-F by appointment (ID/job/housing/legal assistance), mail  [address] [facility]  [phone]  [facility]  Emergency shelter for men  [address] [facility]  [phone]  Emergency shelter for women  [address] [facility]  [phone]  Breakfast & lunch daily, dinner M-F  Case management, job counseling services   [facility]  Emergency shelter for  teens and young adults up to 20yo  [address]  [phone]  [facility]  Emergency shelter for women over 19yo and their kids  [address] [place] [address]  [phone]  [facility]  Day program, meals M-F 1PM (arrive early)  Showers, laundry, hygiene kits, showers, phones, , notary services, case management, ID assistance  [address] [facility]   [phone] M-F 8am-2:30pm  [facility]  Day program  MTWF 7:30am-3:30pm,  8:30am-3:30pm  Crisis intervention, employment assistance, food/clothing, hygiene kits, bus tokens, mail  [address] [facility] [address]  [phone]  [facility]  Mobile outreach for homeless persons in [facility]  [phone]  [facility]  Primary healthcare, case management, dental services, TB placement  Call ahead  [address] [facility] [address]  [phone]  [place] [facility]  Connects homeless people with their loved ones in other cities by providing transportation costs   [phone]      [place] RESOURCES:     [place] [facility] Mental Health Crisis Response Team:    [place] ([place], [place], [place], and [facility]) ([facility] and [facility])  [phone]      Outpatient Mental Health & Addiction Clinic Resources for both [facility] and [facility]:    [facility] Resources  Website: [email or web address]  Main Number: [phone]    [facility] ([facility])  [address] ([address] [facility]) [place] [address]  [phone]    [facility] ([facility])  [address] ([address]) [place] [address] [phone]    [facility]  [address] ([address]) [place] [address] [phone]    [facility]  [address] ([address] [facility]) [place] [address] [phone]      Addiction Treatment Resources for both [facility] and  [facility]:    [facility] (Detox, Residential, PHP, IOP, and Aftercare Programs)  [address] [place] [address]  [phone]    [facility] (Residential, IOP, Transitional Living, and Aftercare Programs)  [address] [place] [address]  [phone]    [facility] (Inpatient, Residential, Detox, IOP, Outpatient, and Aftercare Programs)  [address] [place] [id number] [phone] or toll free at [phone]      Outpatient Mental Health Psychotherapy Resources for both [facility] and [facility]:    Christy Peraza, [facility]  [address]  [place] [address]  [phone]  Specialties: Depression, Anxiety, and Life Transitions    Elena Moctezuma, PhD  [address] [address]  [address]  [place] [address]  [phone]  Specialties: Testing and Evaluation, Education and Learning Disabilities, and ADHD    Ethel Fan, [facility] [address] [place] [address], [place] [address]  [phone]  Specialties: Obsessive-Compulsive (OCD), Depression, and Relationship Issues    Sally Senior [facility] [address] [place] [address]  [phone]  Specialties: Trauma & PTSD, Mood Disorders, and Anxiety    Sally Echols, PhD, [facility] [address], [place] [address]  [phone]  Specialties: Family Conflict, Child, and Relationship Issues    Catlheen Loredo [facility] [place] [address] [phone]  Specialties: Anxiety, Depression, and Anger Management      Wound care scrotum    Daily - cleanse wound w/ Vashe moistened gauze, pack w/ 1in Iodoform gauze, abd pad and secure w/ mesh briefs.   please follow up with urology  Daily showers      Pleas follow up with pscyhiatry providers  at [email or web address]        IN CASE OF SUICIDAL THINKING, call the National Suicide Hotline  Number: 988    988 Suicide & Crisis Lifeline: 988 , 4-035-604-TALK (8255)  Provides 24/7, free and confidential support for people in distress, prevention and crisis resources for you or your loved ones, and best practices for professionals.    Call, text or chat.  [email or web address]

## 2023-01-27 NOTE — ASSESSMENT & PLAN NOTE
Surgical wound present     - s/p right orchiectomy and right hemiscrotal abscess I&D   - Mildly hypotensive and low grade fever in the ED  - s/p 500 cc IVF   - US of scrotum and testicles with evidence of interval postoperative change of right orchiectomy in this patient with reported history of torsion.  There is a 0.8 cm anechoic focus in the testicular fossa resection bed with adjacent soft tissue thickening/edema and hyperemia noting heterogeneous echogenicity at the reported incision site.  Findings could relate to underlying soft tissue infection/cellulitis in the appropriate clinical setting there is postoperative change with associated postoperative fluid/fluid collection noting sterility of fluid cannot be assessed on ultrasound.   - Urology consulted in the ED who cleaned and packed wound  - Recommend continuing abx with Bactrim DS for 7 - 10 days and return to LTAC  - Appreciate SW/CM assistance with placement  - Will need urology follow-up  - prn analgesics  1/26 s/p  right orchiectomy and right hemiscrotal abscess I&D. No urologic interventions  Wound packed with iodoform gauze following washout with iodine and NS, no areas requiring debridement . 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 continue PO Bactrim DS for 7-10 days . F/u with Urology clinic in 2-3 weeks. wound care following.   1/27  plan to discharge with [facility] urology and wound care follow up with bactrim x7 days . oxycodone discontinued.
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
## 2023-01-27 NOTE — ASSESSMENT & PLAN NOTE
- Reports last heroin use prior to arrival  - PRNn ativan for withdrawal symptoms  - PRN anti-emetics  - No s/s of withdrawal noted   1/26 [facility] + for methadone, opiates and marijuana.    1/27  . oxycodone discontinued. started on celebrex 200mg daily and robaxin. addiction psychiatry to  reassess patient tomorrow to consider suboxone initiation   1/28 off opiates for > 12h. prescribed suboxone. patient to discharge to [facility] with psychiatry follow up

## 2023-01-27 NOTE — PROGRESS NOTES
"[facility] ([address] Medicine  Progress Note    Patient Name: Endy Bruno  MRN: [id number]  Patient Class: IP- Inpatient   Admission Date: 1/20/2023  Length of Stay: 3 days  Attending Physician: Ryan Alcazar MD  Primary Care Provider: Primary Doctor No        Subjective:     Principal Problem:Testicular abscess        HPI:  Endy Bruno is a 47 y.o. 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 Patient presented to the emergency department due to concern for wound infection. Per chart review, patient had been d/c'ed following his orchiectomy to an LTAC for IV abx and wound care for his open scrotal wound. He reportedly left AMA because he was not allowed visitors on 1/13/22. He has not since received abx nor wound care and is self reported to be "living on the streets." He presents today after noticing that his wound was "leaking pus" and for worsening pain. Reports most recent IVDU prior to arrival. He endorses chills and nausea. Denies chest pain, shortness of breath, urinary changes, diarrhea, or vomiting.    ED: mildly hypotensive at 95/53 and tachycardic up to 102 bpm. Low grade temp of 99.9. WBC of 6.98K, Hb 10.7. CMP within normal limitis. UA negative. Wound cultures obtained. Scrotal US with evidence of interval postoperative change of right orchiectomy in this patient with reported history of torsion.  There is a 0.8 cm anechoic focus in the testicular fossa resection bed with adjacent soft tissue thickening/edema and hyperemia noting heterogeneous echogenicity at the reported incision site.  Findings could relate to underlying soft tissue infection/cellulitis in the appropriate clinical setting there is postoperative change with associated postoperative fluid/fluid collection noting sterility of fluid cannot be assessed on " ultrasound.  Correlation with physical exam and appropriate subspecialty consultation advised. Urology consulted and packed the wound at the bedside with iodoform gauze following washout with iodine and NS. There were no areas requiring debridement. Lower extremity US without DVT. Given IV ciprofloxacin, toradol, and a nicotine patch.      Overview/Hospital Course:   1/25 2023  seen by Urology in ED. Awaiting placement. Left AMA from LTAC- states he will stay this time., ready for discharge. He wants to go to LTAC. Not allowed to leave floor.   1/26 s/p  right orchiectomy and right hemiscrotal abscess I&D. No urologic interventions  Wound packed with iodoform gauze following washout with iodine and NS, no areas requiring debridement . 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 continue PO Bactrim DS for 7-10 days . F/u with Urology clinic in 2-3 weeks. wound care following. Urology consulted for hole to the Right  side of his scrotum not present during previous admit.   UTox + for methadone, opiates and marijuana.  addiction psychiatry consulted. s/p urology eval for New  open draining tract on right lateral scrotum skin. no urological intervention. [facility] wound care  referral . Probably cant go back to LTAC, trying SNF vs shelter with wound care   1/27 addiction psychiatry eval today.  per wound care recs -  needs daily care - cleanse wound w/ Vashe moistened gauze, pack  Iodoform gauze, abd pad and secure w/ mesh briefs. Daily showers. staff to educate him regarding wound care. plan to discharge with [facility] urology and wound care follow up with bactrim x7 days . oxycodone discontinued. started on celebrex 200mg daily and robaxin. addiction psychiatry to  reassess patient tomorrow to consider suboxone initiation         Review of Systems:   Pain scale:   Constitutional:  fever,  chills, headache, vision loss, hearing loss, weight loss, Generalized weakness, falls, loss of smell, loss of taste, poor  appetite,  sore throat  Respiratory: cough, shortness of breath.   Cardiovascular: chest pain, dizziness, palpitations, orthopnea, swelling of feet, syncope  Gastrointestinal: nausea, vomiting, abdominal pain, diarrhea, black stool,  blood in stool, change in bowel habits  Genitourinary: hematuria, dysuria, urgency, frequency, testicular pain  Integument/Breast: rash,  pruritis  Hematologic/Lymphatic: easy bruising, lymphadenopathy  Musculoskeletal: arthralgias , myalgias, back pain, neck pain, knee pain  Neurological: confusion, seizures, tremors, slurred speech  Behavioral/Psych:  depression, anxiety, auditory or visual hallucinations     OBJECTIVE:     Physical Exam:  Body mass index is 21.63 kg/m².    Constitutional: Appears well-developed and well-nourished.   Head: Normocephalic and atraumatic.   Neck: Normal range of motion. Neck supple.   Cardiovascular: Normal heart rate.  Regular heart rhythm.  Pulmonary/Chest: Effort normal.   Abdominal: No distension.  No tenderness   - hole to the Right  side of his scrotum with Serosanguineous drainage   Musculoskeletal: Normal range of motion. No edema.   Neurological: Alert and oriented to person, place, and time.   Skin: Skin is warm and dry.   Psychiatric: Normal mood and affect. Behavior is normal.                  Vital Signs  Temp: 97.9 °F (36.6 °C) (01/27/23 1557)  Pulse: (Abnormal) 124 (01/27/23 1557)  Resp: 18 (01/27/23 1557)  BP: (Abnormal) 103/56 (01/27/23 1557)  SpO2: 98 % (01/27/23 1557)     24 Hour VS Range    Temp:  [97.5 °F (36.4 °C)-98.8 °F (37.1 °C)]   Pulse:  []   Resp:  [17-20]   BP: (103-128)/(55-67)   SpO2:  [92 %-98 %]     Intake/Output Summary (Last 24 hours) at 1/27/2023 1654  Last data filed at 1/26/2023 2109  Gross per 24 hour   Intake no documentation   Output 700 ml   Net -700 ml         I/O This Shift:  No intake/output data recorded.    Wt Readings from Last 3 Encounters:   01/22/23 59 kg (129 lb 15.7 oz)   01/14/23 54.3 kg (119  lb 11.4 oz)   01/12/23 50.1 kg (110 lb 7.2 oz)       I have personally reviewed the vitals and recorded Intake/Output     Laboratory/Diagnostic Data:    CBC/Anemia Labs: Coags:    Recent Labs   Lab 01/21/23 0451 01/22/23 0401 01/24/23 0447   WBC 11.39 10.58 4.13   HGB 11.0* 10.0* 10.4*   HCT 34.0* 31.1* 33.5*    190 200   MCV 81* 81* 83   RDW 15.8* 16.1* 16.0*    No results for input(s): PT, INR, APTT in the last 168 hours.     Chemistries: ABG:   Recent Labs   Lab 01/21/23 0451 01/22/23 0401 01/24/23 0447   * 135* 138   K 3.5 3.2* 3.8    103 109   CO2 21* 22* 18*   BUN 12 10 18   CREATININE 0.9 0.8 1.1   CALCIUM 9.0 9.3 9.3   PROT 6.9 6.8 7.0   BILITOT 0.5 1.4* 0.2   ALKPHOS 91 93 86   ALT 13 12 7*   AST 14 14 7*    No results for input(s): PH, PCO2, PO2, HCO3, POCSATURATED, BE in the last 168 hours.     POCT Glucose: HbA1c:    No results for input(s): POCTGLUCOSE in the last 168 hours. No results found for: HGBA1C     Cardiac Enzymes: Ejection Fractions:    No results for input(s): CPK, CPKMB, MB, TROPONINI in the last 72 hours. No results found for: EF       No results for input(s): COLORU, APPEARANCEUA, PHUR, SPECGRAV, PROTEINUA, GLUCUA, KETONESU, BILIRUBINUA, OCCULTUA, NITRITE, UROBILINOGEN, LEUKOCYTESUR, RBCUA, WBCUA, BACTERIA, SQUAMEPITHEL, HYALINECASTS in the last 48 hours.    Invalid input(s): WRIGHTSUR    No results found for: PROCAL, LACTATE  No results found for: BNP  No results found for: CRP, SEDRATE  No results found for: DDIMER  No results found for: FERRITIN  No results found for: LDH  No results found for: TROPONINI, CPK  No results found for this or any previous visit.  POC Rapid COVID (no units)   Date Value   12/03/2021 Negative   12/06/2020 Negative       Microbiology labs for the last week  Microbiology Results (last 7 days)       Procedure Component Value Units Date/Time    Culture, Anaerobe [[id number]] Collected: 01/20/23 0547    Order Status: Completed Specimen:  Abscess from Groin Updated: 01/24/23 0742     Anaerobic Culture No anaerobes isolated    Aerobic culture [[id number]] Collected: 01/20/23 0547    Order Status: Completed Specimen: Abscess from Groin Updated: 01/23/23 1051     Aerobic Bacterial Culture Genital sadaf, no predominant organism            Reviewed and noted in plan where applicable- Please see chart for full lab data.    Lines/Drains:       Midline Catheter Insertion/Assessment  - Single Lumen 01/20/23 1201 Right basilic vein (medial side of arm) 18g x 8cm (Active)   Site Assessment Intact;Dry;Clean 01/25/23 1910   IV Device Securement catheter securement device 01/25/23 1910   Line Status Saline locked 01/25/23 1910   Dressing Type Biopatch in place 01/25/23 1910   Dressing Status Intact;Dry;Clean 01/25/23 1910   Dressing Intervention Integrity maintained 01/25/23 1910   Dressing Change Due 01/27/23 01/23/23 1940   Site Change Due 02/18/23 01/23/23 1940   Reason Not Rotated Not due 01/23/23 1940   Number of days: 5       Imaging      No results found for this or any previous visit.      US Scrotum And Testicles  Narrative: EXAMINATION:  US SCROTUM AND TESTICLES    CLINICAL HISTORY:  Other injury of unspecified body region, initial encounter    TECHNIQUE:  Sonography of the scrotum and testes.    COMPARISON:  Scrotal ultrasound 01/07/2023.    FINDINGS:  Right Testicle:    There is interval postoperative change of right orchiectomy in this patient with reported history of testicular torsion.  There is approximately 0.8 cm anechoic focus within the right testicular fossa noting there is adjacent heterogeneous edema/skin thickening and hyperemia.  Additionally there is heterogeneous echogenicity about the reported incision site.  Findings could relate to underlying postoperative change versus soft tissue infection/cellulitis in the appropriate clinical setting with postoperative fluid versus fluid collection noting sterility cannot be assessed on  ultrasound.    Left Testicle:    *Size: 4.0 x 2.1 x 2.7 cm  *Appearance: Normal.  *Flow: Normal arterial and venous flow  *Epididymis: Normal.  *Hydrocele: None.  *Varicocele: Questionable left-sided varicocele noting pampiniform plexus veins measure up to 3 mm.  Other findings: None.  Impression: 1. Interval postoperative change of right orchiectomy in this patient with reported history of torsion.  There is a 0.8 cm anechoic focus in the testicular fossa resection bed with adjacent soft tissue thickening/edema and hyperemia noting heterogeneous echogenicity at the reported incision site.  Findings could relate to underlying soft tissue infection/cellulitis in the appropriate clinical setting there is postoperative change with associated postoperative fluid/fluid collection noting sterility of fluid cannot be assessed on ultrasound.  Correlation with physical exam and appropriate subspecialty consultation advised.  2. Questionable left-sided varicocele with impending formed plexus veins measuring up to 3 mm.  Otherwise unremarkable sonographic evaluation of the left testicle.  This report was flagged in Epic as abnormal.    Electronically signed by resident: Dex Castillo  Date:    01/20/2023  Time:    02:54    Electronically signed by: Zenia Naik MD  Date:    01/20/2023  Time:    03:47  US Lower Extremity Veins Bilateral  Narrative: EXAMINATION:  US LOWER EXTREMITY VEINS BILATERAL    CLINICAL HISTORY:  Other specified soft tissue disorders    TECHNIQUE:  Duplex and color flow Doppler and dynamic compression was performed of the bilateral lower extremity veins was performed.    COMPARISON:  None.    FINDINGS:  Right thigh veins: The common femoral, femoral, popliteal, and upper greater saphenous veins are patent and free of thrombus. The veins are normally compressible and have normal phasic flow and augmentation response.    Right calf veins: The visualized calf veins are patent.    Left thigh veins: The common  femoral, femoral, popliteal, and upper greater saphenous veins are patent and free of thrombus. The veins are normally compressible and have normal phasic flow and augmentation response.    Left calf veins: The visualized calf veins are patent.    Miscellaneous: Mild bilateral lower extremity subcutaneous edema.  Enlarged bilateral inguinal lymph nodes measuring 2.1 x 0.9 x 3.0 cm on the left and 3.2 x 0.7 x 1.9 cm on the right.  Impression: No evidence of deep venous thrombosis in either lower extremity.    Enlarged bilateral inguinal lymph nodes of uncertain etiology/clinical significance.  Clinical correlation is advised.    Electronically signed by resident: Dex Castillo  Date:    01/20/2023  Time:    02:51    Electronically signed by: Zenia Naik MD  Date:    01/20/2023  Time:    03:40      Labs, Imaging, EKG and Diagnostic results from 1/27/2023 were reviewed.    Medications:  Medication list was reviewed and changes noted under Assessment/Plan.  No current facility-administered medications on file prior to encounter.     No current outpatient medications on file prior to encounter.     Scheduled Medications:  acetaminophen, 1,000 mg, Oral, Q8H  celecoxib, 200 mg, Oral, Daily  methocarbamoL, 500 mg, Oral, QID  nicotine, 1 patch, Transdermal, Daily  polyethylene glycol, 17 g, Oral, Daily  sulfamethoxazole-trimethoprim 800-160mg, 1 tablet, Oral, BID      PRN: albuterol-ipratropium, aluminum-magnesium hydroxide-simethicone, bisacodyL, dextrose 10%, dextrose 10%, dicyclomine, glucagon (human recombinant), glucose, glucose, loperamide, LORazepam, melatonin, naloxone, ondansetron, prochlorperazine, simethicone, sodium chloride 0.9%  Infusions:   Estimated Creatinine Clearance: 70 mL/min (based on SCr of 1.1 mg/dL).    Assessment/Plan:      * Testicular abscess  Surgical wound present     - s/p right orchiectomy and right hemiscrotal abscess I&D   - Mildly hypotensive and low grade fever in the ED  - s/p 500 cc IVF    - US of scrotum and testicles with evidence of interval postoperative change of right orchiectomy in this patient with reported history of torsion.  There is a 0.8 cm anechoic focus in the testicular fossa resection bed with adjacent soft tissue thickening/edema and hyperemia noting heterogeneous echogenicity at the reported incision site.  Findings could relate to underlying soft tissue infection/cellulitis in the appropriate clinical setting there is postoperative change with associated postoperative fluid/fluid collection noting sterility of fluid cannot be assessed on ultrasound.   - Urology consulted in the ED who cleaned and packed wound  - Recommend continuing abx with Bactrim DS for 7 - 10 days and return to LTAC  - Appreciate SW/CM assistance with placement  - Will need urology follow-up  - prn analgesics  1/26 s/p  right orchiectomy and right hemiscrotal abscess I&D. No urologic interventions  Wound packed with iodoform gauze following washout with iodine and NS, no areas requiring debridement . 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 continue PO Bactrim DS for 7-10 days . F/u with Urology clinic in 2-3 weeks. wound care following.   1/27  plan to discharge with [facility] urology and wound care follow up with bactrim x7 days . oxycodone discontinued.     SIRS (systemic inflammatory response syndrome)  This patient does have evidence of infective focus  My overall impression is sepsis. Vital signs were reviewed and noted in progress note.  Antibiotics given-   Antibiotics (From admission, onward)      Start     Stop Route Frequency Ordered    01/21/23 0915  sulfamethoxazole-trimethoprim 800-160mg per tablet 1 tablet         -- Oral 2 times daily 01/21/23 0804          Cultures were taken-   Microbiology Results (last 7 days)       Procedure Component Value Units Date/Time    Culture, Anaerobe [[id number]] Collected: 01/20/23 0527    Order Status: Completed Specimen: Abscess from Groin Updated:  01/24/23 0742     Anaerobic Culture No anaerobes isolated    Aerobic culture [[id number]] Collected: 01/20/23 0547    Order Status: Completed Specimen: Abscess from Groin Updated: 01/23/23 1051     Aerobic Bacterial Culture Genital sadaf, no predominant organism    Aerobic culture (Specify Source) **CANNOT BE ORDERED AS STAT* [[id number]] Collected: 01/20/23 0550    Order Status: Sent Specimen: Wound from Sacrum Updated: 01/20/23 0551          Latest lactate reviewed, they are-  No results for input(s): LACTATE in the last 72 hours.    Organ dysfunction indicated by   Source - Testicular abscess    Source control Achieved by- drainage of pus from wound, wound care    - Continue antibiotics as above  - Patient with persistent hypotension and poor oral intake   - Maintenance IV fluids    Surgical wound present  - See above  1/2Urology consulted for hole to the Right  side of his scrotum not present during previous admit. wound care following.    Opioid use disorder, severe, dependence  - Reports last heroin use prior to arrival  - PRNn ativan for withdrawal symptoms  - PRN anti-emetics  - No s/s of withdrawal noted   1/26 UTox + for methadone, opiates and marijuana.    1/27  . oxycodone discontinued. started on celebrex 200mg daily and robaxin. addiction psychiatry to  reassess patient tomorrow to consider suboxone initiation       Anemia of infection and chronic disease  - patient's anemia is currently controlled.  - current CBC reviewed -   Lab Results   Component Value Date    HGB 10.4 (L) 01/24/2023    HCT 33.5 (L) 01/24/2023     - Monitor serial CBC and transfuse if patient becomes hemodynamically unstable, symptomatic, or H/H drops below 7/21.         Dependence on nicotine from cigarettes  - Nicotine patch        VTE Risk Mitigation (From admission, onward)           Ordered     IP VTE HIGH RISK PATIENT  Once         01/20/23 1550     Reason for No Pharmacological VTE Prophylaxis  Once        Question:  Reasons:   Answer:  Risk of Bleeding    01/20/23 1550                    Discharge Planning   CEDRIC: 1/28/2023     Code Status: Full Code   Is the patient medically ready for discharge?: No    Reason for patient still in hospital (select all that apply): Treatment  Discharge Plan A: Home   Discharge Delays: (Abnormal) Post-Acute Set-up              Ryan Alcazar MD  Department of Hospital Medicine   [facility] - Intensive Care ([place]-16)

## 2023-01-27 NOTE — PROGRESS NOTES
Patient educated on wound care. Patient was able to perform wound care including cleaning and packing of wound. Wound care supplies (Vashe, iodoform, gauze, AB pads) will be provided to patient upon discharge to enable him to perform wound care until outpatient wound care can be established.

## 2023-01-27 NOTE — PLAN OF CARE
Calling [facility] [phone] to get fax number to fax wound care orders and also see how often pt could be seen for wound care.  Jo answered; they need a referral for wound care specifically to do wound care at [facility]; frequency will be whatever their providers decide; not daily.  Can fax to [phone].    Calling Lisette at [facility] at [phone] to see if they can provide wound care supplies to this patient with Medicaid d/cing with daily wound care.      TED DamonN, BS, RN, CCM

## 2023-01-27 NOTE — PLAN OF CARE
Patient switched off opioid medications for pain management - now on celebrex and robaxin. Plan to reassess patient tomorrow; can consider suboxone initiation at that time. Can give PRN's for withdrawal symptoms during interim.    Recommendations:  - please utilize PRN's for opioid withdrawal as below:  Opiate Withdrawal Protocol:              -Clonidine 0.1mg PO q4hr PRN for withdrawal associated HTN              -Bentyl 10mg PO q6hr PRN for abdominal muscle cramps              -Loperamide 2mg PO q6hr PRN for diarrhea              -Zofran 4mg SL q8hr PRN for nausea    Lorenzo Murguia MD  [employer] PGY-II

## 2023-01-28 VITALS
WEIGHT: 130 LBS | HEART RATE: 56 BPM | HEIGHT: 65 IN | SYSTOLIC BLOOD PRESSURE: 109 MMHG | BODY MASS INDEX: 21.66 KG/M2 | DIASTOLIC BLOOD PRESSURE: 54 MMHG | TEMPERATURE: 98 F | RESPIRATION RATE: 18 BRPM | OXYGEN SATURATION: 99 %

## 2023-01-28 PROBLEM — Z75.8 DISCHARGE PLANNING ISSUES: Status: ACTIVE | Noted: 2023-01-28

## 2023-01-28 LAB — SARS-COV-2 RNA RESP QL NAA+PROBE: NOT DETECTED

## 2023-01-28 PROCEDURE — 99232 PR SUBSEQUENT HOSPITAL CARE,LEVL II: ICD-10-PCS | Mod: ,,, | Performed by: PSYCHIATRY & NEUROLOGY

## 2023-01-28 PROCEDURE — U0005 INFEC AGEN DETEC AMPLI PROBE: HCPCS | Performed by: HOSPITALIST

## 2023-01-28 PROCEDURE — 25000003 PHARM REV CODE 250: Performed by: HOSPITALIST

## 2023-01-28 PROCEDURE — 25000003 PHARM REV CODE 250: Performed by: STUDENT IN AN ORGANIZED HEALTH CARE EDUCATION/TRAINING PROGRAM

## 2023-01-28 PROCEDURE — 99239 HOSP IP/OBS DSCHRG MGMT >30: CPT | Mod: ,,, | Performed by: HOSPITALIST

## 2023-01-28 PROCEDURE — 99239 PR HOSPITAL DISCHARGE DAY,>30 MIN: ICD-10-PCS | Mod: ,,, | Performed by: HOSPITALIST

## 2023-01-28 PROCEDURE — 99232 SBSQ HOSP IP/OBS MODERATE 35: CPT | Mod: ,,, | Performed by: PSYCHIATRY & NEUROLOGY

## 2023-01-28 PROCEDURE — 25000003 PHARM REV CODE 250

## 2023-01-28 RX ORDER — IBUPROFEN 200 MG
1 TABLET ORAL DAILY
Qty: 30 PATCH | Refills: 2 | Status: SHIPPED | OUTPATIENT
Start: 2023-01-28

## 2023-01-28 RX ORDER — ACETAMINOPHEN 500 MG
1000 TABLET ORAL EVERY 8 HOURS
Qty: 60 TABLET | Refills: 0 | Status: SHIPPED | OUTPATIENT
Start: 2023-01-28

## 2023-01-28 RX ORDER — BUPRENORPHINE AND NALOXONE 8; 2 MG/1; MG/1
1 FILM, SOLUBLE BUCCAL; SUBLINGUAL DAILY
Qty: 30 FILM | Refills: 0 | Status: SHIPPED | OUTPATIENT
Start: 2023-01-28 | End: 2023-02-27

## 2023-01-28 RX ORDER — CELECOXIB 200 MG/1
200 CAPSULE ORAL DAILY
Qty: 10 CAPSULE | Refills: 0 | Status: SHIPPED | OUTPATIENT
Start: 2023-01-28

## 2023-01-28 RX ORDER — SULFAMETHOXAZOLE AND TRIMETHOPRIM 800; 160 MG/1; MG/1
1 TABLET ORAL 2 TIMES DAILY
Qty: 13 TABLET | Refills: 0 | Status: SHIPPED | OUTPATIENT
Start: 2023-01-28 | End: 2023-02-04

## 2023-01-28 RX ORDER — METHOCARBAMOL 500 MG/1
500 TABLET, FILM COATED ORAL 4 TIMES DAILY
Qty: 40 TABLET | Refills: 0 | Status: SHIPPED | OUTPATIENT
Start: 2023-01-28 | End: 2023-02-07

## 2023-01-28 RX ADMIN — CELECOXIB 200 MG: 100 CAPSULE ORAL at 10:01

## 2023-01-28 RX ADMIN — METHOCARBAMOL TABLETS 500 MG: 500 TABLET, COATED ORAL at 10:01

## 2023-01-28 RX ADMIN — ACETAMINOPHEN 1000 MG: 500 TABLET ORAL at 06:01

## 2023-01-28 RX ADMIN — SULFAMETHOXAZOLE AND TRIMETHOPRIM 1 TABLET: 800; 160 TABLET ORAL at 10:01

## 2023-01-28 NOTE — DISCHARGE SUMMARY
"[facility] ([address] Medicine  Discharge Summary      Patient Name: Endy Bruno  MRN: [id number]  SUSAN: [id number]  Patient Class: IP- Inpatient  Admission Date: 1/20/2023  Hospital Length of Stay: 4 days  Discharge Date and Time:  01/28/2023 7:44 AM  Attending Physician: Ryan Alcazar MD   Discharging Provider: Ryan Alcazar MD  Primary Care Provider: [facility] Medicine Team: [facility] Ryan Alcazar MD  Primary Care Team: [facility]    HPI:   Endy Bruno is a 47 y.o. 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 Patient presented to the emergency department due to concern for wound infection. Per chart review, patient had been d/c'ed following his orchiectomy to an LTAC for IV abx and wound care for his open scrotal wound. He reportedly left AMA because he was not allowed visitors on 1/13/22. He has not since received abx nor wound care and is self reported to be "living on the streets." He presents today after noticing that his wound was "leaking pus" and for worsening pain. Reports most recent IVDU prior to arrival. He endorses chills and nausea. Denies chest pain, shortness of breath, urinary changes, diarrhea, or vomiting.    ED: mildly hypotensive at 95/53 and tachycardic up to 102 bpm. Low grade temp of 99.9. WBC of 6.98K, Hb 10.7. CMP within normal limitis. UA negative. Wound cultures obtained. Scrotal US with evidence of interval postoperative change of right orchiectomy in this patient with reported history of torsion.  There is a 0.8 cm anechoic focus in the testicular fossa resection bed with adjacent soft tissue thickening/edema and hyperemia noting heterogeneous echogenicity at the reported incision site.  Findings could relate to underlying soft tissue infection/cellulitis in the " appropriate clinical setting there is postoperative change with associated postoperative fluid/fluid collection noting sterility of fluid cannot be assessed on ultrasound.  Correlation with physical exam and appropriate subspecialty consultation advised. Urology consulted and packed the wound at the bedside with iodoform gauze following washout with iodine and NS. There were no areas requiring debridement. Lower extremity US without DVT. Given IV ciprofloxacin, toradol, and a nicotine patch.      * No surgery found *      Hospital Course:    1/25 2023  seen by Urology in ED. Awaiting placement. Left AMA from LTAC- states he will stay this time., ready for discharge. He wants to go to LTAC. Not allowed to leave floor.   1/26 s/p  right orchiectomy and right hemiscrotal abscess I&D. No urologic interventions  Wound packed with iodoform gauze following washout with iodine and NS, no areas requiring debridement . 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 continue PO Bactrim DS for 7-10 days . F/u with Urology clinic in 2-3 weeks. wound care following. Urology consulted for hole to the Right  side of his scrotum not present during previous admit.   UTox + for methadone, opiates and marijuana.  addiction psychiatry consulted. s/p urology eval for New  open draining tract on right lateral scrotum skin. no urological intervention. [facility] wound care  referral . Probably cant go back to LTAC, trying SNF vs shelter with wound care   1/27 addiction psychiatry eval today.  per wound care recs -  needs daily care - cleanse wound w/ Vashe moistened gauze, pack  Iodoform gauze, abd pad and secure w/ mesh briefs. Daily showers. staff to educate him regarding wound care. plan to discharge with [facility] urology and wound care follow up with bactrim x7 days . oxycodone discontinued. started on celebrex 200mg daily and robaxin. addiction psychiatry to  reassess patient tomorrow to consider suboxone initiation   1/28 off opiates  for > 12h. Plan to discharge to [facility]  with wound supplies, [facility] wound care and urology follow up.  Psychiatry follow up today with  suboxone initiation . prescribed suboxone. patient to discharge to [facility] with psychiatry follow up        Goals of Care Treatment Preferences:  Code Status: Full Code      Consults:   Consults (From admission, onward)          Status Ordering Provider     Inpatient consult to Pain Management  Once        Provider:  (Not yet assigned)    Acknowledged LORRAINE MAX     Inpatient consult to Psychiatry  Once        Provider:  (Not yet assigned)    Completed LORRAINE MAX.     Inpatient consult to Urology  Once        Provider:  (Not yet assigned)    Completed LORRAINE MAX.     Inpatient consult to Social Work  Once        Provider:  (Not yet assigned)    Completed YEE HERRERA K     Inpatient consult to Midline team  Once        Provider:  (Not yet assigned)    Completed MECHE HERRERAECHWU K     Inpatient consult to Urology  Once        Provider:  (Not yet assigned)    Completed FRANCISCA HERRERAWU K           Assessment/Plan:      * Testicular abscess  Surgical wound present      - s/p right orchiectomy and right hemiscrotal abscess I&D   - Mildly hypotensive and low grade fever in the ED  - s/p 500 cc IVF   - US of scrotum and testicles with evidence of interval postoperative change of right orchiectomy in this patient with reported history of torsion.  There is a 0.8 cm anechoic focus in the testicular fossa resection bed with adjacent soft tissue thickening/edema and hyperemia noting heterogeneous echogenicity at the reported incision site.  Findings could relate to underlying soft tissue infection/cellulitis in the appropriate clinical setting there is postoperative change with associated postoperative fluid/fluid collection noting sterility of fluid cannot be assessed on ultrasound.   - Urology consulted in the ED who cleaned and packed wound  -  Recommend continuing abx with Bactrim DS for 7 - 10 days and return to LTAC  - Appreciate SW/CM assistance with placement  - Will need urology follow-up  - prn analgesics  1/26 s/p  right orchiectomy and right hemiscrotal abscess I&D. No urologic interventions  Wound packed with iodoform gauze following washout with iodine and NS, no areas requiring debridement . 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 continue PO Bactrim DS for 7-10 days . F/u with Urology clinic in 2-3 weeks. wound care following.   1/27  plan to discharge with [facility] urology and wound care follow up with bactrim x7 days . oxycodone discontinued.      Discharge planning issues  1/28 Patient is homeless with no family support.   Plan to discharge to [facility]  with wound supplies, [facility] wound care and urology follow up.   Patient taught about wound care         SIRS (systemic inflammatory response syndrome)  This patient does have evidence of infective focus  My overall impression is sepsis. Vital signs were reviewed and noted in progress note.  Antibiotics given-   Antibiotics (From admission, onward)        Start     Stop Route Frequency Ordered     01/21/23 0915   sulfamethoxazole-trimethoprim 800-160mg per tablet 1 tablet         -- Oral 2 times daily 01/21/23 0804             Cultures were taken-   Microbiology Results (last 7 days)         Procedure Component Value Units Date/Time     Culture, Anaerobe [[id number]] Collected: 01/20/23 0547     Order Status: Completed Specimen: Abscess from Groin Updated: 01/24/23 0742       Anaerobic Culture No anaerobes isolated     Aerobic culture [[id number]] Collected: 01/20/23 0547     Order Status: Completed Specimen: Abscess from Groin Updated: 01/23/23 1051       Aerobic Bacterial Culture Genital sadaf, no predominant organism     Aerobic culture (Specify Source) **CANNOT BE ORDERED AS STAT* [[id number]] Collected: 01/20/23 0550     Order Status: Sent Specimen: Wound from Sacrum Updated:  01/20/23 0551             Latest lactate reviewed, they are-  No results for input(s): LACTATE in the last 72 hours.     Organ dysfunction indicated by   Source - Testicular abscess     Source control Achieved by- drainage of pus from wound, wound care     - Continue antibiotics as above  - Patient with persistent hypotension and poor oral intake   - Maintenance IV fluids     Surgical wound present  - See above  1/2Urology consulted for hole to the Right  side of his scrotum not present during previous admit. wound care following.     Opioid use disorder, severe, dependence  - Reports last heroin use prior to arrival  - PRNn ativan for withdrawal symptoms  - PRN anti-emetics  - No s/s of withdrawal noted   1/26 UTox + for methadone, opiates and marijuana.    1/27  . oxycodone discontinued. started on celebrex 200mg daily and robaxin. addiction psychiatry to  reassess patient tomorrow to consider suboxone initiation   1/28 off opiates for > 12h.  . prescribed suboxone. patient to discharge to [facility] with psychiatry follow up      Anemia of infection and chronic disease  - patient's anemia is currently controlled.  - current CBC reviewed -         Lab Results   Component Value Date     HGB 10.4 (L) 01/24/2023     HCT 33.5 (L) 01/24/2023      - Monitor serial CBC and transfuse if patient becomes hemodynamically unstable, symptomatic, or H/H drops below 7/21.            Dependence on nicotine from cigarettes  - Nicotine patch         Final Active Diagnoses:    Diagnosis Date Noted POA    PRINCIPAL PROBLEM:  Testicular abscess [N45.4] 01/09/2023 Yes    Discharge planning issues [Z02.9] 01/28/2023 Not Applicable    Cannabis use disorder, moderate, dependence [F12.20] 01/27/2023 Yes     Chronic    Amphetamine use disorder, severe [F15.20] 01/27/2023 Yes     Chronic    IVDU (intravenous drug user) [F19.90] 01/27/2023 Yes    Tobacco use disorder [F17.200] 01/27/2023 Yes    SIRS (systemic inflammatory response  syndrome) [R65.10] 01/22/2023 Yes    Opioid use disorder, severe, dependence [F11.20] 01/14/2023 Yes    Surgical wound present [T14.8XXA] 01/14/2023 Yes    Anemia of infection and chronic disease [B99.9, D63.8] 01/14/2023 Yes    Dependence on nicotine from cigarettes [F17.210] 01/14/2023 Yes      Problems Resolved During this Admission:       Medications:  Reconciled Home Medications:      Medication List        Start taking these medications      acetaminophen 500 MG tablet  Commonly known as: TYLENOL  Take 2 tablets (1,000 mg total) by mouth every 8 (eight) hours.     buprenorphine-naloxone 8-2 mg 8-2 mg  Commonly known as: SUBOXONE  Place 1 Film under the tongue once daily. Please cut the film  in half and take in am and pm     celecoxib 200 MG capsule  Commonly known as: CeleBREX  Take 1 capsule (200 mg total) by mouth once daily.     methocarbamoL 500 MG Tab  Commonly known as: ROBAXIN  Take 1 tablet (500 mg total) by mouth 4 (four) times daily. for 10 days     nicotine 14 mg/24 hr  Commonly known as: NICODERM CQ  Place 1 patch onto the skin once daily.     sulfamethoxazole-trimethoprim 800-160mg 800-160 mg Tab  Commonly known as: BACTRIM DS  Take 1 tablet by mouth 2 (two) times daily. for 13 doses                Discharged Condition: fair    Disposition: Home or Self Care               Follow Up:     Patient Instructions:   No discharge procedures on file.    Laboratory/Diagnostic Data:    CBC/Anemia Labs: Coags:    Recent Labs   Lab 01/22/23  0401 01/24/23  0447   WBC 10.58 4.13   HGB 10.0* 10.4*   HCT 31.1* 33.5*    200   MCV 81* 83   RDW 16.1* 16.0*    No results for input(s): PT, INR, APTT in the last 168 hours.     Chemistries: ABG:   Recent Labs   Lab 01/22/23  0401 01/24/23  0447   * 138   K 3.2* 3.8    109   CO2 22* 18*   BUN 10 18   CREATININE 0.8 1.1   CALCIUM 9.3 9.3   PROT 6.8 7.0   BILITOT 1.4* 0.2   ALKPHOS 93 86   ALT 12 7*   AST 14 7*    No results for input(s): PH, PCO2,  PO2, HCO3, POCSATURATED, BE in the last 168 hours.     POCT Glucose: HbA1c:    No results for input(s): POCTGLUCOSE in the last 168 hours. No results found for: HGBA1C     Cardiac Enzymes: Ejection Fractions:    No results for input(s): CPK, CPKMB, MB, TROPONINI in the last 72 hours. No results found for: EF       No results for input(s): COLORU, APPEARANCEUA, PHUR, SPECGRAV, PROTEINUA, GLUCUA, KETONESU, BILIRUBINUA, OCCULTUA, NITRITE, UROBILINOGEN, LEUKOCYTESUR, RBCUA, WBCUA, BACTERIA, SQUAMEPITHEL, HYALINECASTS in the last 48 hours.    Invalid input(s): WRIGHTSUR    No results found for: PROCAL, LACTATE  No results found for: BNP  No results found for: CRP, SEDRATE  No results found for: DDIMER  No results found for: FERRITIN  No results found for: LDH  No results found for: TROPONINI, CPK  No results found for this or any previous visit.  SARS-CoV2 (COVID-19) Qualitative PCR (no units)   Date Value   01/28/2023 Not Detected     POC Rapid COVID (no units)   Date Value   12/03/2021 Negative   12/06/2020 Negative       Microbiology labs for the last week  Microbiology Results (last 7 days)       Procedure Component Value Units Date/Time    Culture, Anaerobe [[id number]] Collected: 01/20/23 0547    Order Status: Completed Specimen: Abscess from Groin Updated: 01/24/23 0742     Anaerobic Culture No anaerobes isolated    Aerobic culture [[id number]] Collected: 01/20/23 0547    Order Status: Completed Specimen: Abscess from Groin Updated: 01/23/23 1051     Aerobic Bacterial Culture Genital sadaf, no predominant organism              Reviewed and noted in plan where applicable- Please see chart for full lab data.    Lines/Drains:       Midline Catheter Insertion/Assessment  - Single Lumen 01/20/23 1201 Right basilic vein (medial side of arm) 18g x 8cm (Active)   Site Assessment Clean;Dry;Intact 01/27/23 2000   IV Device Securement catheter securement device 01/27/23 2000   Line Status Saline locked 01/27/23 2000   Dressing  Type Biopatch in place 01/27/23 2000   Dressing Status Clean;Dry;Intact 01/27/23 2000   Dressing Intervention Integrity maintained 01/27/23 2000   Dressing Change Due 01/27/23 01/23/23 1940   Site Change Due 02/18/23 01/27/23 2000   Reason Not Rotated Not due 01/23/23 1940   Number of days: 7       Imaging      No results found for this or any previous visit.      US Scrotum And Testicles  Narrative: EXAMINATION:  US SCROTUM AND TESTICLES    CLINICAL HISTORY:  Other injury of unspecified body region, initial encounter    TECHNIQUE:  Sonography of the scrotum and testes.    COMPARISON:  Scrotal ultrasound 01/07/2023.    FINDINGS:  Right Testicle:    There is interval postoperative change of right orchiectomy in this patient with reported history of testicular torsion.  There is approximately 0.8 cm anechoic focus within the right testicular fossa noting there is adjacent heterogeneous edema/skin thickening and hyperemia.  Additionally there is heterogeneous echogenicity about the reported incision site.  Findings could relate to underlying postoperative change versus soft tissue infection/cellulitis in the appropriate clinical setting with postoperative fluid versus fluid collection noting sterility cannot be assessed on ultrasound.    Left Testicle:    *Size: 4.0 x 2.1 x 2.7 cm  *Appearance: Normal.  *Flow: Normal arterial and venous flow  *Epididymis: Normal.  *Hydrocele: None.  *Varicocele: Questionable left-sided varicocele noting pampiniform plexus veins measure up to 3 mm.  Other findings: None.  Impression: 1. Interval postoperative change of right orchiectomy in this patient with reported history of torsion.  There is a 0.8 cm anechoic focus in the testicular fossa resection bed with adjacent soft tissue thickening/edema and hyperemia noting heterogeneous echogenicity at the reported incision site.  Findings could relate to underlying soft tissue infection/cellulitis in the appropriate clinical setting there  is postoperative change with associated postoperative fluid/fluid collection noting sterility of fluid cannot be assessed on ultrasound.  Correlation with physical exam and appropriate subspecialty consultation advised.  2. Questionable left-sided varicocele with impending formed plexus veins measuring up to 3 mm.  Otherwise unremarkable sonographic evaluation of the left testicle.  This report was flagged in Epic as abnormal.    Electronically signed by resident: Dex Castillo  Date:    01/20/2023  Time:    02:54    Electronically signed by: Zenia Naik MD  Date:    01/20/2023  Time:    03:47  US Lower Extremity Veins Bilateral  Narrative: EXAMINATION:  US LOWER EXTREMITY VEINS BILATERAL    CLINICAL HISTORY:  Other specified soft tissue disorders    TECHNIQUE:  Duplex and color flow Doppler and dynamic compression was performed of the bilateral lower extremity veins was performed.    COMPARISON:  None.    FINDINGS:  Right thigh veins: The common femoral, femoral, popliteal, and upper greater saphenous veins are patent and free of thrombus. The veins are normally compressible and have normal phasic flow and augmentation response.    Right calf veins: The visualized calf veins are patent.    Left thigh veins: The common femoral, femoral, popliteal, and upper greater saphenous veins are patent and free of thrombus. The veins are normally compressible and have normal phasic flow and augmentation response.    Left calf veins: The visualized calf veins are patent.    Miscellaneous: Mild bilateral lower extremity subcutaneous edema.  Enlarged bilateral inguinal lymph nodes measuring 2.1 x 0.9 x 3.0 cm on the left and 3.2 x 0.7 x 1.9 cm on the right.  Impression: No evidence of deep venous thrombosis in either lower extremity.    Enlarged bilateral inguinal lymph nodes of uncertain etiology/clinical significance.  Clinical correlation is advised.    Electronically signed by resident: Dex  Jonathan  Date:    01/20/2023  Time:    02:51    Electronically signed by: Zenia Naik MD  Date:    01/20/2023  Time:    03:40      Imaging:  Imaging Results               US Scrotum And Testicles (Final result)  Result time 01/20/23 03:47:28      Final result by Zenia Naik MD (01/20/23 03:47:28)               Impression:      1. Interval postoperative change of right orchiectomy in this patient with reported history of torsion.  There is a 0.8 cm anechoic focus in the testicular fossa resection bed with adjacent soft tissue thickening/edema and hyperemia noting heterogeneous echogenicity at the reported incision site.  Findings could relate to underlying soft tissue infection/cellulitis in the appropriate clinical setting there is postoperative change with associated postoperative fluid/fluid collection noting sterility of fluid cannot be assessed on ultrasound.  Correlation with physical exam and appropriate subspecialty consultation advised.  2. Questionable left-sided varicocele with impending formed plexus veins measuring up to 3 mm.  Otherwise unremarkable sonographic evaluation of the left testicle.  This report was flagged in Epic as abnormal.    Electronically signed by resident: Dex Castillo  Date:    01/20/2023  Time:    02:54    Electronically signed by: Zenia Naik MD  Date:    01/20/2023  Time:    03:47             Narrative:    EXAMINATION:  US SCROTUM AND TESTICLES    CLINICAL HISTORY:  Other injury of unspecified body region, initial encounter    TECHNIQUE:  Sonography of the scrotum and testes.    COMPARISON:  Scrotal ultrasound 01/07/2023.    FINDINGS:  Right Testicle:    There is interval postoperative change of right orchiectomy in this patient with reported history of testicular torsion.  There is approximately 0.8 cm anechoic focus within the right testicular fossa noting there is adjacent heterogeneous edema/skin thickening and hyperemia.  Additionally there is heterogeneous echogenicity  about the reported incision site.  Findings could relate to underlying postoperative change versus soft tissue infection/cellulitis in the appropriate clinical setting with postoperative fluid versus fluid collection noting sterility cannot be assessed on ultrasound.    Left Testicle:    *Size: 4.0 x 2.1 x 2.7 cm  *Appearance: Normal.  *Flow: Normal arterial and venous flow  *Epididymis: Normal.  *Hydrocele: None.  *Varicocele: Questionable left-sided varicocele noting pampiniform plexus veins measure up to 3 mm.  Other findings: None.                                     US Lower Extremity Veins Bilateral (Final result)  Result time 01/20/23 03:40:40      Final result by Zenia Naik MD (01/20/23 03:40:40)               Impression:      No evidence of deep venous thrombosis in either lower extremity.    Enlarged bilateral inguinal lymph nodes of uncertain etiology/clinical significance.  Clinical correlation is advised.    Electronically signed by resident: Dex Castillo  Date:    01/20/2023  Time:    02:51    Electronically signed by: Zenia Naik MD  Date:    01/20/2023  Time:    03:40             Narrative:    EXAMINATION:  US LOWER EXTREMITY VEINS BILATERAL    CLINICAL HISTORY:  Other specified soft tissue disorders    TECHNIQUE:  Duplex and color flow Doppler and dynamic compression was performed of the bilateral lower extremity veins was performed.    COMPARISON:  None.    FINDINGS:  Right thigh veins: The common femoral, femoral, popliteal, and upper greater saphenous veins are patent and free of thrombus. The veins are normally compressible and have normal phasic flow and augmentation response.    Right calf veins: The visualized calf veins are patent.    Left thigh veins: The common femoral, femoral, popliteal, and upper greater saphenous veins are patent and free of thrombus. The veins are normally compressible and have normal phasic flow and augmentation response.    Left calf veins: The visualized calf  veins are patent.    Miscellaneous: Mild bilateral lower extremity subcutaneous edema.  Enlarged bilateral inguinal lymph nodes measuring 2.1 x 0.9 x 3.0 cm on the left and 3.2 x 0.7 x 1.9 cm on the right.                                      Follow Up Instructions:     No future appointments.    Discharge Instructions:  Discharge Procedure Orders   Ambulatory referral/consult to Urology   Standing Status: Future   Referral Priority: Routine Referral Type: Consultation   Referral Reason: Specialty Services Required   Requested Specialty: Urology   Number of Visits Requested: 1     Ambulatory referral/consult to Wound Clinic   Standing Status: Future   Referral Priority: Routine Referral Type: Consultation   Referral Reason: Specialty Services Required   Requested Specialty: Wound Care   Number of Visits Requested: 1     Ambulatory referral/consult to Psychiatry   Standing Status: Future   Referral Priority: Routine Referral Type: Psychiatric   Referral Reason: Specialty Services Required   Requested Specialty: Psychiatry   Number of Visits Requested: 1     Diet Adult Regular     Notify your health care provider if you experience any of the following:  increased confusion or weakness     Notify your health care provider if you experience any of the following:  persistent dizziness, light-headedness, or visual disturbances     Notify your health care provider if you experience any of the following:  worsening rash     Notify your health care provider if you experience any of the following:  severe persistent headache     Notify your health care provider if you experience any of the following:  difficulty breathing or increased cough     Notify your health care provider if you experience any of the following:  redness, tenderness, or signs of infection (pain, swelling, redness, odor or green/yellow discharge around incision site)     Notify your health care provider if you experience any of the following:  persistent  nausea and vomiting or diarrhea     Notify your health care provider if you experience any of the following:  temperature >100.4     Notify your health care provider if you experience any of the following:  severe uncontrolled pain     Activity as tolerated         Total time spent on discharge 40  minutes     Ryan Alcazar MD  Attending Staff Physician  [facility]

## 2023-01-28 NOTE — ASSESSMENT & PLAN NOTE
1/28 Patient is homeless with no family support.   Plan to discharge to [facility]  with wound supplies, [facility] wound care and urology follow up.   Patient taught about wound care

## 2023-01-28 NOTE — PROGRESS NOTES
"[facility] - Intensive Care ([address] Medicine  Progress Note    Patient Name: Endy Bruno  MRN: [id number]  Patient Class: IP- Inpatient   Admission Date: 1/20/2023  Length of Stay: 4 days  Attending Physician: Ryan Alcazar MD  Primary Care Provider: Primary Doctor No        Subjective:     Principal Problem:Testicular abscess        HPI:  Endy Bruno is a 47 y.o. 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 Patient presented to the emergency department due to concern for wound infection. Per chart review, patient had been d/c'ed following his orchiectomy to an LTAC for IV abx and wound care for his open scrotal wound. He reportedly left AMA because he was not allowed visitors on 1/13/22. He has not since received abx nor wound care and is self reported to be "living on the streets." He presents today after noticing that his wound was "leaking pus" and for worsening pain. Reports most recent IVDU prior to arrival. He endorses chills and nausea. Denies chest pain, shortness of breath, urinary changes, diarrhea, or vomiting.    ED: mildly hypotensive at 95/53 and tachycardic up to 102 bpm. Low grade temp of 99.9. WBC of 6.98K, Hb 10.7. CMP within normal limitis. UA negative. Wound cultures obtained. Scrotal US with evidence of interval postoperative change of right orchiectomy in this patient with reported history of torsion.  There is a 0.8 cm anechoic focus in the testicular fossa resection bed with adjacent soft tissue thickening/edema and hyperemia noting heterogeneous echogenicity at the reported incision site.  Findings could relate to underlying soft tissue infection/cellulitis in the appropriate clinical setting there is postoperative change with associated postoperative fluid/fluid collection noting sterility of fluid cannot be assessed on " ultrasound.  Correlation with physical exam and appropriate subspecialty consultation advised. Urology consulted and packed the wound at the bedside with iodoform gauze following washout with iodine and NS. There were no areas requiring debridement. Lower extremity US without DVT. Given IV ciprofloxacin, toradol, and a nicotine patch.      Overview/Hospital Course:   1/25 2023  seen by Urology in ED. Awaiting placement. Left AMA from LTAC- states he will stay this time., ready for discharge. He wants to go to LTAC. Not allowed to leave floor.   1/26 s/p  right orchiectomy and right hemiscrotal abscess I&D. No urologic interventions  Wound packed with iodoform gauze following washout with iodine and NS, no areas requiring debridement . 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 continue PO Bactrim DS for 7-10 days . F/u with Urology clinic in 2-3 weeks. wound care following. Urology consulted for hole to the Right  side of his scrotum not present during previous admit.   [facility]ox + for methadone, opiates and marijuana.  addiction psychiatry consulted. s/p urology eval for New  open draining tract on right lateral scrotum skin. no urological intervention. [facility] wound care  referral . Probably cant go back to LTAC, trying SNF vs shelter with wound care   1/27 addiction psychiatry eval today.  per wound care recs -  needs daily care - cleanse wound w/ Vashe moistened gauze, pack  Iodoform gauze, abd pad and secure w/ mesh briefs. Daily showers. staff to educate him regarding wound care. plan to discharge with [facility] urology and wound care follow up with bactrim x7 days . oxycodone discontinued. started on celebrex 200mg daily and robaxin. addiction psychiatry to  reassess patient tomorrow to consider suboxone initiation   1/28 off opiates for > 12h. Plan to discharge to [facility]  with wound supplies, [facility] wound care and urology follow up.  Psychiatry follow up today with  suboxone initiation . prescribed  suboxone. patient to discharge to [facility] with psychiatry follow up         Review of Systems:   Pain scale:   Constitutional:  fever,  chills, headache, vision loss, hearing loss, weight loss, Generalized weakness, falls, loss of smell, loss of taste, poor appetite,  sore throat  Respiratory: cough, shortness of breath.   Cardiovascular: chest pain, dizziness, palpitations, orthopnea, swelling of feet, syncope  Gastrointestinal: nausea, vomiting, abdominal pain, diarrhea, black stool,  blood in stool, change in bowel habits  Genitourinary: hematuria, dysuria, urgency, frequency, testicular pain  Integument/Breast: rash,  pruritis  Hematologic/Lymphatic: easy bruising, lymphadenopathy  Musculoskeletal: arthralgias , myalgias, back pain, neck pain, knee pain  Neurological: confusion, seizures, tremors, slurred speech  Behavioral/Psych:  depression, anxiety, auditory or visual hallucinations     OBJECTIVE:     Physical Exam:  Body mass index is 21.63 kg/m².    Constitutional: Appears well-developed and well-nourished.   Head: Normocephalic and atraumatic.   Neck: Normal range of motion. Neck supple.   Cardiovascular: Normal heart rate.  Regular heart rhythm.  Pulmonary/Chest: Effort normal.   Abdominal: No distension.  No tenderness   - hole to the Right  side of his scrotum with Serosanguineous drainage   Musculoskeletal: Normal range of motion. No edema.   Neurological: Alert and oriented to person, place, and time.   Skin: Skin is warm and dry.   Psychiatric: Normal mood and affect. Behavior is normal.                  Vital Signs  Temp: 97.7 °F (36.5 °C) (01/28/23 0737)  Pulse: (Abnormal) 56 (01/28/23 0737)  Resp: 18 (01/28/23 0737)  BP: (Abnormal) 109/54 (01/28/23 0737)  SpO2: 99 % (01/28/23 0737)     24 Hour VS Range    Temp:  [97.7 °F (36.5 °C)-98.5 °F (36.9 °C)]   Pulse:  []   Resp:  [18]   BP: (103-109)/(54-56)   SpO2:  [93 %-99 %]   No intake or output data in the 24 hours ending 01/28/23  1326        I/O This Shift:  No intake/output data recorded.    Wt Readings from Last 3 Encounters:   01/22/23 59 kg (129 lb 15.7 oz)   01/14/23 54.3 kg (119 lb 11.4 oz)   01/12/23 50.1 kg (110 lb 7.2 oz)       I have personally reviewed the vitals and recorded Intake/Output     Laboratory/Diagnostic Data:    CBC/Anemia Labs: Coags:    Recent Labs   Lab 01/22/23  0401 01/24/23  0447   WBC 10.58 4.13   HGB 10.0* 10.4*   HCT 31.1* 33.5*    200   MCV 81* 83   RDW 16.1* 16.0*    No results for input(s): PT, INR, APTT in the last 168 hours.     Chemistries: ABG:   Recent Labs   Lab 01/22/23  0401 01/24/23  0447   * 138   K 3.2* 3.8    109   CO2 22* 18*   BUN 10 18   CREATININE 0.8 1.1   CALCIUM 9.3 9.3   PROT 6.8 7.0   BILITOT 1.4* 0.2   ALKPHOS 93 86   ALT 12 7*   AST 14 7*    No results for input(s): PH, PCO2, PO2, HCO3, POCSATURATED, BE in the last 168 hours.     POCT Glucose: HbA1c:    No results for input(s): POCTGLUCOSE in the last 168 hours. No results found for: HGBA1C     Cardiac Enzymes: Ejection Fractions:    No results for input(s): CPK, CPKMB, MB, TROPONINI in the last 72 hours. No results found for: EF       No results for input(s): COLORU, APPEARANCEUA, PHUR, SPECGRAV, PROTEINUA, GLUCUA, KETONESU, BILIRUBINUA, OCCULTUA, NITRITE, UROBILINOGEN, LEUKOCYTESUR, RBCUA, WBCUA, BACTERIA, SQUAMEPITHEL, HYALINECASTS in the last 48 hours.    Invalid input(s): WRIGHTSUR    No results found for: PROCAL, LACTATE  No results found for: BNP  No results found for: CRP, SEDRATE  No results found for: DDIMER  No results found for: FERRITIN  No results found for: LDH  No results found for: TROPONINI, CPK  No results found for this or any previous visit.  SARS-CoV2 (COVID-19) Qualitative PCR (no units)   Date Value   01/28/2023 Not Detected     POC Rapid COVID (no units)   Date Value   12/03/2021 Negative   12/06/2020 Negative       Microbiology labs for the last week  Microbiology Results (last 7 days)        Procedure Component Value Units Date/Time    Culture, Anaerobe [[id number]] Collected: 01/20/23 0547    Order Status: Completed Specimen: Abscess from Groin Updated: 01/24/23 0742     Anaerobic Culture No anaerobes isolated    Aerobic culture [[id number]] Collected: 01/20/23 0547    Order Status: Completed Specimen: Abscess from Groin Updated: 01/23/23 1051     Aerobic Bacterial Culture Genital sadaf, no predominant organism            Reviewed and noted in plan where applicable- Please see chart for full lab data.    Lines/Drains:       Midline Catheter Insertion/Assessment  - Single Lumen 01/20/23 1201 Right basilic vein (medial side of arm) 18g x 8cm (Active)   Site Assessment Intact;Dry;Clean 01/25/23 1910   IV Device Securement catheter securement device 01/25/23 1910   Line Status Saline locked 01/25/23 1910   Dressing Type Biopatch in place 01/25/23 1910   Dressing Status Intact;Dry;Clean 01/25/23 1910   Dressing Intervention Integrity maintained 01/25/23 1910   Dressing Change Due 01/27/23 01/23/23 1940   Site Change Due 02/18/23 01/23/23 1940   Reason Not Rotated Not due 01/23/23 1940   Number of days: 5       Imaging      No results found for this or any previous visit.      US Scrotum And Testicles  Narrative: EXAMINATION:  US SCROTUM AND TESTICLES    CLINICAL HISTORY:  Other injury of unspecified body region, initial encounter    TECHNIQUE:  Sonography of the scrotum and testes.    COMPARISON:  Scrotal ultrasound 01/07/2023.    FINDINGS:  Right Testicle:    There is interval postoperative change of right orchiectomy in this patient with reported history of testicular torsion.  There is approximately 0.8 cm anechoic focus within the right testicular fossa noting there is adjacent heterogeneous edema/skin thickening and hyperemia.  Additionally there is heterogeneous echogenicity about the reported incision site.  Findings could relate to underlying postoperative change versus soft tissue  infection/cellulitis in the appropriate clinical setting with postoperative fluid versus fluid collection noting sterility cannot be assessed on ultrasound.    Left Testicle:    *Size: 4.0 x 2.1 x 2.7 cm  *Appearance: Normal.  *Flow: Normal arterial and venous flow  *Epididymis: Normal.  *Hydrocele: None.  *Varicocele: Questionable left-sided varicocele noting pampiniform plexus veins measure up to 3 mm.  Other findings: None.  Impression: 1. Interval postoperative change of right orchiectomy in this patient with reported history of torsion.  There is a 0.8 cm anechoic focus in the testicular fossa resection bed with adjacent soft tissue thickening/edema and hyperemia noting heterogeneous echogenicity at the reported incision site.  Findings could relate to underlying soft tissue infection/cellulitis in the appropriate clinical setting there is postoperative change with associated postoperative fluid/fluid collection noting sterility of fluid cannot be assessed on ultrasound.  Correlation with physical exam and appropriate subspecialty consultation advised.  2. Questionable left-sided varicocele with impending formed plexus veins measuring up to 3 mm.  Otherwise unremarkable sonographic evaluation of the left testicle.  This report was flagged in Epic as abnormal.    Electronically signed by resident: Dex Castillo  Date:    01/20/2023  Time:    02:54    Electronically signed by: Zenia Naik MD  Date:    01/20/2023  Time:    03:47  US Lower Extremity Veins Bilateral  Narrative: EXAMINATION:  US LOWER EXTREMITY VEINS BILATERAL    CLINICAL HISTORY:  Other specified soft tissue disorders    TECHNIQUE:  Duplex and color flow Doppler and dynamic compression was performed of the bilateral lower extremity veins was performed.    COMPARISON:  None.    FINDINGS:  Right thigh veins: The common femoral, femoral, popliteal, and upper greater saphenous veins are patent and free of thrombus. The veins are normally compressible  and have normal phasic flow and augmentation response.    Right calf veins: The visualized calf veins are patent.    Left thigh veins: The common femoral, femoral, popliteal, and upper greater saphenous veins are patent and free of thrombus. The veins are normally compressible and have normal phasic flow and augmentation response.    Left calf veins: The visualized calf veins are patent.    Miscellaneous: Mild bilateral lower extremity subcutaneous edema.  Enlarged bilateral inguinal lymph nodes measuring 2.1 x 0.9 x 3.0 cm on the left and 3.2 x 0.7 x 1.9 cm on the right.  Impression: No evidence of deep venous thrombosis in either lower extremity.    Enlarged bilateral inguinal lymph nodes of uncertain etiology/clinical significance.  Clinical correlation is advised.    Electronically signed by resident: Dex Castillo  Date:    01/20/2023  Time:    02:51    Electronically signed by: Zenia Naik MD  Date:    01/20/2023  Time:    03:40      Labs, Imaging, EKG and Diagnostic results from 1/28/2023 were reviewed.    Medications:  Medication list was reviewed and changes noted under Assessment/Plan.  No current facility-administered medications on file prior to encounter.     No current outpatient medications on file prior to encounter.     Scheduled Medications:  acetaminophen, 1,000 mg, Oral, Q8H  celecoxib, 200 mg, Oral, Daily  methocarbamoL, 500 mg, Oral, QID  nicotine, 1 patch, Transdermal, Daily  polyethylene glycol, 17 g, Oral, Daily  sulfamethoxazole-trimethoprim 800-160mg, 1 tablet, Oral, BID      PRN: albuterol-ipratropium, aluminum-magnesium hydroxide-simethicone, bisacodyL, dextrose 10%, dextrose 10%, dicyclomine, glucagon (human recombinant), glucose, glucose, loperamide, LORazepam, melatonin, naloxone, ondansetron, prochlorperazine, simethicone, sodium chloride 0.9%  Infusions:   Estimated Creatinine Clearance: 70 mL/min (based on SCr of 1.1 mg/dL).    Assessment/Plan:      * Testicular abscess  Surgical  wound present     - s/p right orchiectomy and right hemiscrotal abscess I&D   - Mildly hypotensive and low grade fever in the ED  - s/p 500 cc IVF   - US of scrotum and testicles with evidence of interval postoperative change of right orchiectomy in this patient with reported history of torsion.  There is a 0.8 cm anechoic focus in the testicular fossa resection bed with adjacent soft tissue thickening/edema and hyperemia noting heterogeneous echogenicity at the reported incision site.  Findings could relate to underlying soft tissue infection/cellulitis in the appropriate clinical setting there is postoperative change with associated postoperative fluid/fluid collection noting sterility of fluid cannot be assessed on ultrasound.   - Urology consulted in the ED who cleaned and packed wound  - Recommend continuing abx with Bactrim DS for 7 - 10 days and return to LTAC  - Appreciate SW/CM assistance with placement  - Will need urology follow-up  - prn analgesics  1/26 s/p  right orchiectomy and right hemiscrotal abscess I&D. No urologic interventions  Wound packed with iodoform gauze following washout with iodine and NS, no areas requiring debridement . 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 continue PO Bactrim DS for 7-10 days . F/u with Urology clinic in 2-3 weeks. wound care following.   1/27  plan to discharge with [facility] urology and wound care follow up with bactrim x7 days . oxycodone discontinued.     Discharge planning issues  1/28 Patient is homeless with no family support.   Plan to discharge to [facility]  with wound supplies, [facility] wound care and urology follow up.   Patient taught about wound care       SIRS (systemic inflammatory response syndrome)  This patient does have evidence of infective focus  My overall impression is sepsis. Vital signs were reviewed and noted in progress note.  Antibiotics given-   Antibiotics (From admission, onward)      Start     Stop Route Frequency Ordered     01/21/23 0915  sulfamethoxazole-trimethoprim 800-160mg per tablet 1 tablet         -- Oral 2 times daily 01/21/23 0804          Cultures were taken-   Microbiology Results (last 7 days)       Procedure Component Value Units Date/Time    Culture, Anaerobe [[id number]] Collected: 01/20/23 0547    Order Status: Completed Specimen: Abscess from Groin Updated: 01/24/23 0742     Anaerobic Culture No anaerobes isolated    Aerobic culture [[id number]] Collected: 01/20/23 0547    Order Status: Completed Specimen: Abscess from Groin Updated: 01/23/23 1051     Aerobic Bacterial Culture Genital sadaf, no predominant organism    Aerobic culture (Specify Source) **CANNOT BE ORDERED AS STAT* [[id number]] Collected: 01/20/23 0550    Order Status: Sent Specimen: Wound from Sacrum Updated: 01/20/23 0551          Latest lactate reviewed, they are-  No results for input(s): LACTATE in the last 72 hours.    Organ dysfunction indicated by   Source - Testicular abscess    Source control Achieved by- drainage of pus from wound, wound care    - Continue antibiotics as above  - Patient with persistent hypotension and poor oral intake   - Maintenance IV fluids    Surgical wound present  - See above  1/2Urology consulted for hole to the Right  side of his scrotum not present during previous admit. wound care following.    Opioid use disorder, severe, dependence  - Reports last heroin use prior to arrival  - PRNn ativan for withdrawal symptoms  - PRN anti-emetics  - No s/s of withdrawal noted   1/26 [facility]ox + for methadone, opiates and marijuana.    1/27  . oxycodone discontinued. started on celebrex 200mg daily and robaxin. addiction psychiatry to  reassess patient tomorrow to consider suboxone initiation   1/28 off opiates for > 12h. prescribed suboxone. patient to discharge to [facility] with psychiatry follow up     Anemia of infection and chronic disease  - patient's anemia is currently controlled.  - current CBC reviewed -   Lab Results    Component Value Date    HGB 10.4 (L) 01/24/2023    HCT 33.5 (L) 01/24/2023     - Monitor serial CBC and transfuse if patient becomes hemodynamically unstable, symptomatic, or H/H drops below 7/21.         Dependence on nicotine from cigarettes  - Nicotine patch        VTE Risk Mitigation (From admission, onward)           Ordered     IP VTE HIGH RISK PATIENT  Once         01/20/23 1550     Reason for No Pharmacological VTE Prophylaxis  Once        Question:  Reasons:  Answer:  Risk of Bleeding    01/20/23 1550                    Discharge Planning   CEDRIC: 1/28/2023     Code Status: Full Code   Is the patient medically ready for discharge?: No    Reason for patient still in hospital (select all that apply): Treatment  Discharge Plan A: Home   Discharge Delays: (Abnormal) Post-Acute Set-up              Ryan Alcazar MD  Department of Hospital Medicine   [facility] - Intensive Care ([place])

## 2023-01-28 NOTE — PLAN OF CARE
Called [facility] to get patient a PCP Appointment No. Is [phone]. Wound Care is also closed  [phone] Office is called for today.      [facility]  Case management   [phone]

## 2023-01-28 NOTE — NURSING
Patient given d/c instructions; given supply of iodoform, ABD pads, gauze, vashe for wound care; midline removed with catheter intact; patient transported via wheelchair to [facility] (arranged by CM).

## 2023-01-28 NOTE — PLAN OF CARE
[facility] transport requested with Carlos in a white [employer] to bring patient to [facility].

## 2023-01-28 NOTE — PLAN OF CARE
Referral packet faxed to and received by [facility] fpr admit to shelter bed today.    Awaiting delivery of patient's home medications.

## 2023-01-29 NOTE — PROGRESS NOTES
"ADDICTION CONSULT FOLLOW UP VISIT     DEPARTMENT:  Psychiatry  SITE: [facility], [address]    DATE OF ADMISSION: 1/20/2023 12:21 AM  LENGTH OF STAY: 4 days    EXAMINING PRACTITIONER: Rodrigo Lobato      SUBJECTIVE:     HISTORY    Patient Name: Endy Bruno  YOB: 1975    CHIEF COMPLAINT     Patient is a 47y M with opioid use disorder who presents for testicular abscess. Denies any current psychiatric complaints at this time, feels that he is doing well on current pain regimen. Discussed with patient providing resources to follow-up for suboxone upon discharge, to which he is amenable.       HPI & PSYCHIATRIC ROS    On evaluation today the patient says he wants to go to rehab ([facility] or [facility]), and if he does not get started on Suboxone he will relapse on dope. He scores a COWS > 11 endorsing cramps (10/10), runny nose, headache, anxiety, chills and has pinpoint pupils. He denies SI, HI, AVH. He says he just took a walk "debating" what he wanted to do and has decided.    PFSH: The patient's past medical history has been reviewed and updated as appropriate within the electronic medical record system.    ROS:  Negative      PSYCHOTROPIC MEDICATIONS     buprenorphine-naloxone 8-2 mg film  - to place 1 Film under the tongue once daily, cut the film in half and take in am and pm       OBJECTIVE:     EXAMINATION    Vitals:    01/27/23 1959 01/28/23 0005 01/28/23 0538 01/28/23 0737   BP: (!) 109/56 (!) 109/55 (!) 107/55 (!) 109/54   BP Location: Left arm Left arm Left arm Left arm   Patient Position: Sitting Lying Lying Lying   Pulse: 98 92 60 (!) 56   Resp: 18 18 18 18   Temp: 98.1 °F (36.7 °C) 98.5 °F (36.9 °C) 98.5 °F (36.9 °C) 97.7 °F (36.5 °C)   TempSrc: Oral Oral Oral Oral   SpO2: (!) 93% (!) 94% 96% 99%   Weight:       Height:           CONSTITUTIONAL  General Appearance: Well groomed, just went for a walk wearing hoodie    MUSCULOSKELETAL  Abnormal Involuntary Movements: " "no    Mental Status Exam:  Appearance: groomed, in hospital gown, lying in bed  Behavior/Cooperation: cooperative, fair eye contact  Speech: normal rate, rhythm, prasody  Mood: "fine"  Affect: full, mood congruent, reactive  Thought Process: linear and goal oriented  Thought Content: denies SI, HI, AVH  Orientation: A&Ox4  Memory: recent and remote intact  Attention Span/Concentration: intact  Insight: fair  Judgment: fair        ASSESSMENT:     DIAGNOSES & PROBLEMS    Opioid Use Disorder, Severe    Patient Active Problem List   Diagnosis    Fracture of left zygomatic arch    Testicular injury    Testicular abscess    Klebsiella pneumoniae infection    Dependence on nicotine from cigarettes    Hepatitis C antibody test positive    Anemia of infection and chronic disease    Opioid use disorder, severe, dependence    Surgical wound present    SIRS (systemic inflammatory response syndrome)    Cannabis use disorder, moderate, dependence    Amphetamine use disorder, severe    IVDU (intravenous drug user)    Tobacco use disorder    Discharge planning issues         PLAN:       MANAGEMENT PLAN, TREATMENT GOALS, THERAPEUTIC TECHNIQUES/APPROACHES & CLINICAL REASONING    Patient with multi drug addiction including opioid, amphetamine, and cannabis use disorders.  He currently is on opioid mediation for acute pain management - defer pain management to primary - consult pain management if assistance needed.  He is potentially interested in suboxone MAT, but would not be a candidate until he is off full agonist opioids for acute pain.  We spoke about potential dispo options he could access status post discharge including IOP rehabs and suboxone MAT clinics.     - utilize PRN's for opioid withdrawal as below:  Opiate Withdrawal Protocol:              -Clonidine 0.1mg PO q4hr PRN for withdrawal associated HTN              -Bentyl 10mg PO q6hr PRN for abdominal muscle cramps              -Loperamide 2mg PO q6hr PRN for diarrhea     "          -Zofran 4mg SL q8hr PRN for nausea    - Patient prescribed Suboxone 8mg daily to cut in half and take BID  - Transferred to [facility] (shelter bed) today    In cases of emergency, daily coverage provided by Acute/ER Psych MD, NP, or SW, with contact numbers located in [facility] On Call Schedule    Case discussed with staff addiction psychiatrist: Dr. Victoria      LABS/IMAGING/STUDIES:  Recent Results (from the past 48 hour(s))   COVID-19 Routine Screening Extended Care Placement    Collection Time: 01/28/23  9:30 AM   Result Value Ref Range    SARS-CoV2 (COVID-19) Qualitative PCR Not Detected Not Detected      No results found for: PHENYTOIN, PHENOBARB, VALPROATE, CBMZ

## 2023-02-08 LAB — FUNGUS SPEC CULT: NORMAL

## 2023-02-25 LAB
ACID FAST MOD KINY STN SPEC: NORMAL
MYCOBACTERIUM SPEC QL CULT: NORMAL

## 2023-04-06 ENCOUNTER — TELEPHONE (OUTPATIENT)
Dept: UROLOGY | Facility: CLINIC | Age: 48
End: 2023-04-06
Payer: MEDICAID

## 2023-04-06 NOTE — TELEPHONE ENCOUNTER
[facility] went to [facility], not able to reach patient      ----- Message from Jim Almendarez MD sent at 1/20/2023  7:02 AM CST -----  Regarding: fu  Please schedule Endy Bruno for a 2-3 week f/u in [facility]!    Thank you,  NM

## 2023-06-19 NOTE — ASSESSMENT & PLAN NOTE
- See above  1/2Urology consulted for hole to the Right  side of his scrotum not present during previous admit. wound care following.   Benzoyl Peroxide Pregnancy And Lactation Text: This medication is Pregnancy Category C. It is unknown if benzoyl peroxide is excreted in breast milk.

## 2024-12-10 ENCOUNTER — HOSPITAL ENCOUNTER (EMERGENCY)
Facility: HOSPITAL | Age: 49
Discharge: HOME | End: 2024-12-11
Attending: STUDENT IN AN ORGANIZED HEALTH CARE EDUCATION/TRAINING PROGRAM
Payer: MEDICAID

## 2024-12-10 DIAGNOSIS — F19.929 INTOXICATION BY DRUG: ICD-10-CM

## 2024-12-10 PROCEDURE — 99283 EMERGENCY DEPT VISIT LOW MDM: CPT | Mod: 25

## 2024-12-10 PROCEDURE — 93005 ELECTROCARDIOGRAM TRACING: CPT

## 2024-12-10 PROCEDURE — 93010 ELECTROCARDIOGRAM REPORT: CPT | Mod: ,,, | Performed by: INTERNAL MEDICINE

## 2024-12-11 VITALS
DIASTOLIC BLOOD PRESSURE: 76 MMHG | WEIGHT: 130.06 LBS | HEIGHT: 65 IN | OXYGEN SATURATION: 99 % | HEART RATE: 60 BPM | SYSTOLIC BLOOD PRESSURE: 110 MMHG | RESPIRATION RATE: 15 BRPM | TEMPERATURE: 98 F | BODY MASS INDEX: 21.67 KG/M2

## 2024-12-11 NOTE — DISCHARGE INSTRUCTIONS
You were seen in the Emergency Department today for concerns that you may have passed out. We obtained a an EKG that was normal.  Your vital signs are stable.  We talked about doing blood work however you declined at this time    Please follow up with your primary doctor in the next 1 week for a repeat evaluation and further management.    Please return to the Emergency Department immediately for any continued or worsening symptoms such as feeling dizzy, lightheaded, if you pass out.

## 2024-12-11 NOTE — ED PROVIDER NOTES
Encounter Date: 12/10/2024       History     Chief Complaint   Patient presents with    Addiction Problem     By stander found pt banging head against the wall. Injected heroin today. GF recently dumped him. 12 mg of suboxone taken.      49-year-old male with a history of opiate use disorder on Suboxone presents to the ED for possible syncope.  The patient states that he has been experiencing housing difficulty with the past several days and states that he has not slept in the past 2 days due to being worried about someone stealing his things.  He was found apparently slumped over outdoors by bystanders and EMS was called to bring him in.  There is a question of whether he fell and hit his head, however patient denies any headache, neck pain, jaw pain, or facial pain.  He states that he is here just to get checked out, and that he has plans for inpatient detox tomorrow morning.        Review of patient's allergies indicates:   Allergen Reactions    Penicillins Anaphylaxis    Aspirin Rash     In childhood. Rash and bruising. Tolerates other NSAIDs including ibuprofen     History reviewed. No pertinent past medical history.  Past Surgical History:   Procedure Laterality Date    INCISION AND DRAINAGE OF ABSCESS Right 1/7/2023    Procedure: INCISION AND DRAINAGE, ABSCESS;  Surgeon: Liyah Shetty MD;  Location: [address];  Service: Urology;  Laterality: Right;    ORCHIECTOMY Right 1/7/2023    Procedure: ORCHIECTOMY;  Surgeon: Liyah Shetty MD;  Location: [address];  Service: Urology;  Laterality: Right;    SCROTUM EXPLORATION Right 1/7/2023    Procedure: EXPLORATION, SCROTUM;  Surgeon: Liyah Shetty MD;  Location: [address];  Service: Urology;  Laterality: Right;     No family history on file.  Social History     Tobacco Use    Smoking status: Every Day     Current packs/day: 1.00     Types: Cigarettes    Smokeless tobacco: Never   Substance Use Topics    Alcohol use: No    Drug use: Yes      Types: IV     Comment: fentanyl IV daily     Review of Systems   Constitutional:  Negative for fever.   HENT:  Negative for sore throat.    Respiratory:  Negative for shortness of breath.    Cardiovascular:  Negative for chest pain.   Gastrointestinal:  Negative for nausea.   Genitourinary:  Negative for dysuria.   Musculoskeletal:  Negative for back pain.   Skin:  Negative for rash.   Neurological:  Negative for weakness.   Hematological:  Does not bruise/bleed easily.       Physical Exam     Initial Vitals [12/10/24 2052]   BP Pulse Resp Temp SpO2   108/70 62 12 97.7 °F (36.5 °C) 99 %      MAP       --         Physical Exam    Nursing note and vitals reviewed.  Constitutional: He appears well-developed and well-nourished.   HENT:   Head: Normocephalic and atraumatic.   Eyes: EOM are normal. Pupils are equal, round, and reactive to light.   Neck: Neck supple.   Normal range of motion.  Cardiovascular:  Normal rate and regular rhythm.           Pulmonary/Chest: Breath sounds normal. No respiratory distress.   Abdominal: Abdomen is soft. He exhibits no distension. There is no abdominal tenderness. There is no rebound.   Musculoskeletal:         General: No tenderness or edema. Normal range of motion.      Cervical back: Normal range of motion and neck supple.     Neurological: He is alert and oriented to person, place, and time. No cranial nerve deficit.   Skin: Skin is warm and dry.         ED Course   Procedures  Labs Reviewed - No data to display    EKG Readings: (Independently Interpreted)   EKG at 8:00 p.m. shows sinus rhythm with PVCs rate 71.  Normal axis.  Normal intervals.  No acute ST or T-wave abnormalities       Imaging Results    None          Medications - No data to display  Medical Decision Making  49-year-old male with a history of opiate use disorder on Suboxone presents to the ED for possible syncope.  The patient states he feels this is related to him being up for the past 2 days due to concern  that someone may still is things as he is currently on house.  He reports that he is going to a outpatient drug rehab facility tomorrow morning.  An EKG performed here appears without concerning abnormalities.  Attempted to draw blood however patient declined.  We will discuss with the patient.  If he declines any further workup we will plan for discharge.  He does not appear acutely intoxicated at this time, and has no evidence of external trauma.    Amount and/or Complexity of Data Reviewed  Labs: ordered.               ED Course as of 12/11/24 0504   Wed Dec 11, 2024   0503 Patient ultimately slept in the ED for several hours, we monitored him without signs of depressed mentation or withdrawal symptoms.  Ultimately who is stable for discharge, requesting ride to outpatient detox facility.    Strict return precautions were discussed, patient verbalized understanding and agreed with plan.  Patient discharged home. [MB]      ED Course User Index  [MB] Kevin Reese MD                           Clinical Impression:  Final diagnoses:  [F19.929] Intoxication by drug          ED Disposition Condition    Discharge Stable          ED Prescriptions    None       Follow-up Information       Follow up With Specialties Details Why Contact Info Additional Information    [facility]   [address]  [address]  [facility] [address] [phone] Please park in [address] or [address] and use [address]. Take Medical Office Bldg. elevators.             Kevin Reese MD  12/11/24 1380

## 2024-12-11 NOTE — ED NOTES
"Patient asked for 30 mins. before blood work is done. He states that he had blood work done @ [facility] one month ago & it took them 5 hours to get the results. He states that it's difficult to get blood from him. " I hate it, can I refuse it." Primary nurse notified of patient's blood refusal & she states that she will notifiy Dr. Hernandez.  "

## 2024-12-11 NOTE — ED TRIAGE NOTES
Endy Bruno, a 49 y.o. male presents to the ED w/ complaint of addiction, by stander found pt banging head against the wall. Injected heroin today. GF recently dumped him. 12 mg of suboxone taken.     Triage note:  Chief Complaint   Patient presents with    Addiction Problem     By stander found pt banging head against the wall. Injected heroin today. GF recently dumped him. 12 mg of suboxone taken.      Review of patient's allergies indicates:   Allergen Reactions    Penicillins Anaphylaxis    Aspirin Rash     In childhood. Rash and bruising. Tolerates other NSAIDs including ibuprofen     No past medical history on file.

## 2024-12-12 ENCOUNTER — HOSPITAL ENCOUNTER (OUTPATIENT)
Facility: HOSPITAL | Age: 49
Discharge: HOME | End: 2024-12-13
Attending: EMERGENCY MEDICINE | Admitting: EMERGENCY MEDICINE
Payer: MEDICAID

## 2024-12-12 DIAGNOSIS — R07.9 CHEST PAIN: ICD-10-CM

## 2024-12-12 DIAGNOSIS — I95.9 HYPOTENSION, UNSPECIFIED HYPOTENSION TYPE: Primary | ICD-10-CM

## 2024-12-12 DIAGNOSIS — I95.9 HYPOTENSION, UNSPECIFIED: ICD-10-CM

## 2024-12-12 LAB
ALBUMIN SERPL BCP-MCNC: 3.8 G/DL (ref 3.5–5.2)
ALLENS TEST: NORMAL
ALP SERPL-CCNC: 98 U/L (ref 40–150)
ALT SERPL W/O P-5'-P-CCNC: 14 U/L (ref 10–44)
ANION GAP SERPL CALC-SCNC: 9 MMOL/L (ref 8–16)
AST SERPL-CCNC: 21 U/L (ref 10–40)
BASOPHILS # BLD AUTO: 0.05 K/UL (ref 0–0.2)
BASOPHILS NFR BLD: 0.5 % (ref 0–1.9)
BILIRUB SERPL-MCNC: 0.5 MG/DL (ref 0.1–1)
BILIRUB UR QL STRIP: NEGATIVE
BNP SERPL-MCNC: 26 PG/ML (ref 0–99)
BUN SERPL-MCNC: 10 MG/DL (ref 6–20)
CALCIUM SERPL-MCNC: 9.2 MG/DL (ref 8.7–10.5)
CHLORIDE SERPL-SCNC: 100 MMOL/L (ref 95–110)
CLARITY UR REFRACT.AUTO: CLEAR
CO2 SERPL-SCNC: 25 MMOL/L (ref 23–29)
COLOR UR AUTO: YELLOW
CREAT SERPL-MCNC: 0.9 MG/DL (ref 0.5–1.4)
DIFFERENTIAL METHOD BLD: NORMAL
EOSINOPHIL # BLD AUTO: 0.2 K/UL (ref 0–0.5)
EOSINOPHIL NFR BLD: 2.3 % (ref 0–8)
ERYTHROCYTE [DISTWIDTH] IN BLOOD BY AUTOMATED COUNT: 12.6 % (ref 11.5–14.5)
EST. GFR  (NO RACE VARIABLE): >60 ML/MIN/1.73 M^2
GLUCOSE SERPL-MCNC: 67 MG/DL (ref 70–110)
GLUCOSE UR QL STRIP: NEGATIVE
HCT VFR BLD AUTO: 43.6 % (ref 40–54)
HGB BLD-MCNC: 15.4 G/DL (ref 14–18)
HGB UR QL STRIP: NEGATIVE
HIV 1+2 AB+HIV1 P24 AG SERPL QL IA: NORMAL
IMM GRANULOCYTES # BLD AUTO: 0.03 K/UL (ref 0–0.04)
IMM GRANULOCYTES NFR BLD AUTO: 0.3 % (ref 0–0.5)
KETONES UR QL STRIP: NEGATIVE
LDH SERPL L TO P-CCNC: 1.83 MMOL/L (ref 0.5–2.2)
LEUKOCYTE ESTERASE UR QL STRIP: NEGATIVE
LYMPHOCYTES # BLD AUTO: 2.2 K/UL (ref 1–4.8)
LYMPHOCYTES NFR BLD: 21.9 % (ref 18–48)
MCH RBC QN AUTO: 30.1 PG (ref 27–31)
MCHC RBC AUTO-ENTMCNC: 35.3 G/DL (ref 32–36)
MCV RBC AUTO: 85 FL (ref 82–98)
MONOCYTES # BLD AUTO: 0.7 K/UL (ref 0.3–1)
MONOCYTES NFR BLD: 6.9 % (ref 4–15)
NEUTROPHILS # BLD AUTO: 7 K/UL (ref 1.8–7.7)
NEUTROPHILS NFR BLD: 68.1 % (ref 38–73)
NITRITE UR QL STRIP: NEGATIVE
NRBC BLD-RTO: 0 /100 WBC
OHS QRS DURATION: 82 MS
OHS QRS DURATION: 84 MS
OHS QRS DURATION: 86 MS
OHS QTC CALCULATION: 432 MS
OHS QTC CALCULATION: 432 MS
OHS QTC CALCULATION: 440 MS
PH UR STRIP: 6 [PH] (ref 5–8)
PLATELET # BLD AUTO: 224 K/UL (ref 150–450)
PMV BLD AUTO: 11.5 FL (ref 9.2–12.9)
POTASSIUM SERPL-SCNC: 3.8 MMOL/L (ref 3.5–5.1)
PROT SERPL-MCNC: 7.3 G/DL (ref 6–8.4)
PROT UR QL STRIP: NEGATIVE
PROVIDER CREDENTIALS: NORMAL
PROVIDER NOTIFIED: NORMAL
RBC # BLD AUTO: 5.12 M/UL (ref 4.6–6.2)
SAMPLE: NORMAL
SITE: NORMAL
SODIUM SERPL-SCNC: 134 MMOL/L (ref 136–145)
SP GR UR STRIP: 1.01 (ref 1–1.03)
TIME NOTIFIED: 1446
TROPONIN I SERPL DL<=0.01 NG/ML-MCNC: <3 NG/L (ref 0–35)
TROPONIN I SERPL DL<=0.01 NG/ML-MCNC: <3 NG/L (ref 0–35)
URN SPEC COLLECT METH UR: NORMAL
VERBAL RESULT READBACK PERFORMED: YES
WBC # BLD AUTO: 10.24 K/UL (ref 3.9–12.7)

## 2024-12-12 PROCEDURE — G0378 HOSPITAL OBSERVATION PER HR: HCPCS

## 2024-12-12 PROCEDURE — 93005 ELECTROCARDIOGRAM TRACING: CPT

## 2024-12-12 PROCEDURE — 83605 ASSAY OF LACTIC ACID: CPT

## 2024-12-12 PROCEDURE — 84484 ASSAY OF TROPONIN QUANT: CPT | Mod: 91

## 2024-12-12 PROCEDURE — 63600175 PHARM REV CODE 636 W HCPCS: Performed by: EMERGENCY MEDICINE

## 2024-12-12 PROCEDURE — 99900035 HC TECH TIME PER 15 MIN (STAT)

## 2024-12-12 PROCEDURE — 99285 EMERGENCY DEPT VISIT HI MDM: CPT | Mod: 25

## 2024-12-12 PROCEDURE — 96360 HYDRATION IV INFUSION INIT: CPT | Mod: 59

## 2024-12-12 PROCEDURE — 87040 BLOOD CULTURE FOR BACTERIA: CPT | Mod: 59

## 2024-12-12 PROCEDURE — 25000003 PHARM REV CODE 250: Performed by: STUDENT IN AN ORGANIZED HEALTH CARE EDUCATION/TRAINING PROGRAM

## 2024-12-12 PROCEDURE — 93010 ELECTROCARDIOGRAM REPORT: CPT | Mod: ,,, | Performed by: INTERNAL MEDICINE

## 2024-12-12 PROCEDURE — 87389 HIV-1 AG W/HIV-1&-2 AB AG IA: CPT | Performed by: PHYSICIAN ASSISTANT

## 2024-12-12 PROCEDURE — S4991 NICOTINE PATCH NONLEGEND: HCPCS | Performed by: STUDENT IN AN ORGANIZED HEALTH CARE EDUCATION/TRAINING PROGRAM

## 2024-12-12 PROCEDURE — 83880 ASSAY OF NATRIURETIC PEPTIDE: CPT

## 2024-12-12 PROCEDURE — 25000003 PHARM REV CODE 250: Performed by: FAMILY MEDICINE

## 2024-12-12 PROCEDURE — 81003 URINALYSIS AUTO W/O SCOPE: CPT

## 2024-12-12 PROCEDURE — 85025 COMPLETE CBC W/AUTO DIFF WBC: CPT

## 2024-12-12 PROCEDURE — 80053 COMPREHEN METABOLIC PANEL: CPT

## 2024-12-12 RX ORDER — ALBUTEROL SULFATE 90 UG/1
2 INHALANT RESPIRATORY (INHALATION) EVERY 6 HOURS PRN
Status: DISCONTINUED | OUTPATIENT
Start: 2024-12-12 | End: 2024-12-13 | Stop reason: HOSPADM

## 2024-12-12 RX ORDER — ONDANSETRON 8 MG/1
8 TABLET, ORALLY DISINTEGRATING ORAL EVERY 8 HOURS PRN
Status: DISCONTINUED | OUTPATIENT
Start: 2024-12-12 | End: 2024-12-13 | Stop reason: HOSPADM

## 2024-12-12 RX ORDER — IBUPROFEN 200 MG
1 TABLET ORAL
Status: COMPLETED | OUTPATIENT
Start: 2024-12-12 | End: 2024-12-13

## 2024-12-12 RX ORDER — TALC
6 POWDER (GRAM) TOPICAL NIGHTLY PRN
Status: DISCONTINUED | OUTPATIENT
Start: 2024-12-12 | End: 2024-12-13 | Stop reason: HOSPADM

## 2024-12-12 RX ORDER — ALUMINUM HYDROXIDE, MAGNESIUM HYDROXIDE, AND SIMETHICONE 1200; 120; 1200 MG/30ML; MG/30ML; MG/30ML
30 SUSPENSION ORAL 4 TIMES DAILY PRN
Status: DISCONTINUED | OUTPATIENT
Start: 2024-12-12 | End: 2024-12-13 | Stop reason: HOSPADM

## 2024-12-12 RX ORDER — NALOXONE HCL 0.4 MG/ML
0.02 VIAL (ML) INJECTION
Status: DISCONTINUED | OUTPATIENT
Start: 2024-12-12 | End: 2024-12-13 | Stop reason: HOSPADM

## 2024-12-12 RX ORDER — IBUPROFEN 200 MG
24 TABLET ORAL
Status: DISCONTINUED | OUTPATIENT
Start: 2024-12-12 | End: 2024-12-13 | Stop reason: HOSPADM

## 2024-12-12 RX ORDER — SODIUM CHLORIDE 0.9 % (FLUSH) 0.9 %
10 SYRINGE (ML) INJECTION EVERY 12 HOURS PRN
Status: DISCONTINUED | OUTPATIENT
Start: 2024-12-12 | End: 2024-12-13 | Stop reason: HOSPADM

## 2024-12-12 RX ORDER — GLUCAGON 1 MG
1 KIT INJECTION
Status: DISCONTINUED | OUTPATIENT
Start: 2024-12-12 | End: 2024-12-13 | Stop reason: HOSPADM

## 2024-12-12 RX ORDER — ACETAMINOPHEN 500 MG
1000 TABLET ORAL EVERY 8 HOURS PRN
Status: DISCONTINUED | OUTPATIENT
Start: 2024-12-12 | End: 2024-12-13 | Stop reason: HOSPADM

## 2024-12-12 RX ORDER — DOCUSATE SODIUM 100 MG
400 CAPSULE ORAL
Status: DISCONTINUED | OUTPATIENT
Start: 2024-12-12 | End: 2024-12-13 | Stop reason: HOSPADM

## 2024-12-12 RX ORDER — IBUPROFEN 200 MG
16 TABLET ORAL
Status: DISCONTINUED | OUTPATIENT
Start: 2024-12-12 | End: 2024-12-13 | Stop reason: HOSPADM

## 2024-12-12 RX ADMIN — SODIUM CHLORIDE, POTASSIUM CHLORIDE, SODIUM LACTATE AND CALCIUM CHLORIDE 1000 ML: 600; 310; 30; 20 INJECTION, SOLUTION INTRAVENOUS at 02:12

## 2024-12-12 RX ADMIN — Medication 400 ML: at 10:12

## 2024-12-12 RX ADMIN — SODIUM CHLORIDE, SODIUM LACTATE, POTASSIUM CHLORIDE, AND CALCIUM CHLORIDE 1000 ML: .6; .31; .03; .02 INJECTION, SOLUTION INTRAVENOUS at 01:12

## 2024-12-12 RX ADMIN — Medication 1 PATCH: at 11:12

## 2024-12-12 NOTE — ED PROVIDER NOTES
"Encounter Date: 12/12/2024       History     Chief Complaint   Patient presents with    Hypotension     Patient is a 50 yo male with PMHx of IVDU, tobacco use, Hepatitis C. Presenting to the ED with hypotension. Patient overdosed two days ago and presented to [facility] ED.  At that time he did not have any withdrawal symptoms and was discharged after a period of observation. He went to [facility] to detox. This morning [facility] was unable to get his blood pressure and sent him via EMS to [facility].  During EMS transport, patient referred used placement of IV and did not receive any fluids. Manual pressure with EMS 72/48. Patient states that he has had prior episodes of hypotension while detoxing which improves with Gatorade and a cigarette. He states that blood pressure is at baseline low with systolics in the 90s. Patient denies any history of complicated withdrawals/seizures. He last used heroin/fentanyl two days ago via skin poppers and is still "coming down". He states that he has not eaten much over the last two days due to recent drug use. Denies any CP, SOB, abdominal pain, fevers/chills, nausea/vomiting, cough, sore throat, blood in stool or urine.    The history is provided by the patient and the EMS personnel. No  was used.     Review of patient's allergies indicates:   Allergen Reactions    Penicillins Anaphylaxis    Aspirin Rash     In childhood. Rash and bruising. Tolerates other NSAIDs including ibuprofen     History reviewed. No pertinent past medical history.  Past Surgical History:   Procedure Laterality Date    INCISION AND DRAINAGE OF ABSCESS Right 1/7/2023    Procedure: INCISION AND DRAINAGE, ABSCESS;  Surgeon: Liyah Shetty MD;  Location: [address];  Service: Urology;  Laterality: Right;    ORCHIECTOMY Right 1/7/2023    Procedure: ORCHIECTOMY;  Surgeon: Liyah Shetty MD;  Location: [facility] OR [address] [place] [address];  Service: Urology;  Laterality: Right;    SCROTUM EXPLORATION Right 1/7/2023    " Procedure: EXPLORATION, SCROTUM;  Surgeon: Liyah Shetty MD;  Location: [facility] OR [address] [place] [address];  Service: Urology;  Laterality: Right;     No family history on file.  Social History     Tobacco Use    Smoking status: Every Day     Current packs/day: 1.00     Types: Cigarettes    Smokeless tobacco: Never   Substance Use Topics    Alcohol use: No    Drug use: Yes     Types: IV     Comment: fentanyl IV daily     Review of Systems   Constitutional:  Negative for chills and fever.   HENT:  Negative for sore throat.    Respiratory:  Negative for cough and shortness of breath.    Cardiovascular:  Negative for chest pain and leg swelling.   Gastrointestinal:  Negative for abdominal pain, blood in stool, nausea and vomiting.   Genitourinary:  Negative for dysuria and hematuria.   Skin:  Negative for rash and wound.   Psychiatric/Behavioral:  The patient is nervous/anxious.        Physical Exam     Initial Vitals [12/12/24 1056]   BP Pulse Resp Temp SpO2   (!) 86/55 68 18 98.1 °F (36.7 °C) 97 %      MAP       --         Physical Exam    Constitutional: He is not diaphoretic. He is cooperative. No distress.   HENT:   Head: Normocephalic.   Nose: Nose normal.   Eyes: Conjunctivae are normal.   Cardiovascular:  Normal rate, regular rhythm and normal heart sounds.           No murmur heard.  Pulmonary/Chest: Breath sounds normal. No respiratory distress.   Abdominal: Abdomen is soft. He exhibits no distension. There is no abdominal tenderness.   Musculoskeletal:         General: No tenderness or edema.     Neurological: He is alert and oriented to person, place, and time.   Skin: Skin is warm and dry.         ED Course   Procedures  Labs Reviewed   COMPREHENSIVE METABOLIC PANEL - Abnormal       Result Value    Sodium 134 (*)     Potassium 3.8      Chloride 100      CO2 25      Glucose 67 (*)     BUN 10      Creatinine 0.9      Calcium 9.2      Total Protein 7.3      Albumin 3.8      Total Bilirubin 0.5      Alkaline Phosphatase 98       AST 21      ALT 14      eGFR >60.0      Anion Gap 9     CULTURE, BLOOD   CULTURE, BLOOD   HIV 1 / 2 ANTIBODY    HIV 1/2 Ag/Ab Non-reactive      Narrative:     Release to patient->Immediate   CBC W/ AUTO DIFFERENTIAL    WBC 10.24      RBC 5.12      Hemoglobin 15.4      Hematocrit 43.6      MCV 85      MCH 30.1      MCHC 35.3      RDW 12.6      Platelets 224      MPV 11.5      Immature Granulocytes 0.3      Gran # (ANC) 7.0      Immature Grans (Abs) 0.03      Lymph # 2.2      Mono # 0.7      Eos # 0.2      Baso # 0.05      nRBC 0      Gran % 68.1      Lymph % 21.9      Mono % 6.9      Eosinophil % 2.3      Basophil % 0.5      Differential Method Automated     TROPONIN I HIGH SENSITIVITY    Troponin I High Sensitivity <3     TROPONIN I HIGH SENSITIVITY    Troponin I High Sensitivity <3     B-TYPE NATRIURETIC PEPTIDE    BNP 26     URINALYSIS, REFLEX TO URINE CULTURE    Specimen UA Urine, Clean Catch      Color, UA Yellow      Appearance, UA Clear      pH, UA 6.0      Specific Gravity, UA 1.010      Protein, UA Negative      Glucose, UA Negative      Ketones, UA Negative      Bilirubin (UA) Negative      Occult Blood UA Negative      Nitrite, UA Negative      Leukocytes, UA Negative      Narrative:     Specimen Source->Urine   ISTAT LACTATE    POC Lactate 1.83      Verbal Result Readback Performed Yes      Provider Credentials: MD      Provider Notified: [email or web address]      Time Notifed: 1446      Sample VENOUS      Site Other      Allens Test N/A          ECG Results              EKG 12-lead (Final result)        Collection Time Result Time QRS Duration OHS QTC Calculation    12/12/24 13:37:31 12/12/24 14:22:21 82 440                     Final result by Interface, Lab In [facility] (12/12/24 14:22:24)                   Narrative:    Test Reason : R07.9,    Vent. Rate :  91 BPM     Atrial Rate :  91 BPM     P-R Int : 130 ms          QRS Dur :  82 ms      QT Int : 358 ms       P-R-T Axes :  81  93  67 degrees    QTcB Int : 440  ms    Normal sinus rhythm  Rightward axis  Borderline Abnormal ECG  When compared with ECG of 10-Dec-2024 22:00,  Premature ventricular complexes are no longer Present  Confirmed by Jackie Cifuentes ([id number]) on 12/12/2024 2:22:16 PM    Referred By: ROSSANA SELF           Confirmed By: Jackie Cifuentes                                  Imaging Results              X-Ray Chest AP Portable (Final result)  Result time 12/12/24 13:38:07      Final result by Ferny Su MD (12/12/24 13:38:07)                   Impression:      No acute abnormality.      Electronically signed by: Ferny Su MD  Date:    12/12/2024  Time:    13:38               Narrative:    EXAMINATION:  XR CHEST AP PORTABLE    CLINICAL HISTORY:  Chest Pain;    TECHNIQUE:  Single views of the chest were performed.    COMPARISON:  Chest radiograph dated December 3, 2021    FINDINGS:  The trachea and cardiomediastinal silhouette remains stable.  There is no evidence of pleural effusions, pneumothoraces or consolidations.  Chronic appearing interstitial markings are identified bilaterally.  Osseous structures demonstrate no evidence for acute fractures or dislocations.                                       Medications   nicotine 21 mg/24 hr 1 patch (1 patch Transdermal Patch Applied 12/12/24 1112)   sodium chloride 0.9% flush 10 mL (has no administration in time range)   naloxone 0.4 mg/mL injection 0.02 mg (has no administration in time range)   glucose chewable tablet 16 g (has no administration in time range)   glucose chewable tablet 24 g (has no administration in time range)   glucagon (human recombinant) injection 1 mg (has no administration in time range)   acetaminophen tablet 1,000 mg (has no administration in time range)   ondansetron disintegrating tablet 8 mg (has no administration in time range)   albuterol inhaler 2 puff (has no administration in time range)   melatonin tablet 6 mg (has no administration in time range)   aluminum-magnesium  hydroxide-simethicone 200-200-20 mg/5 mL suspension 30 mL (has no administration in time range)   dextrose 10% bolus 125 mL 125 mL (has no administration in time range)   dextrose 10% bolus 250 mL 250 mL (has no administration in time range)   electrolytes-dextrose (Pedialyte) oral solution 400 mL (has no administration in time range)   lactated ringers bolus 1,000 mL (0 mLs Intravenous Stopped 12/12/24 1400)   lactated ringers bolus 1,000 mL (0 mLs Intravenous Stopped 12/12/24 1539)     Medical Decision Making  Patient is a 48 yo male with PMHx of IVDU, tobacco use, hepatitis-C presenting to the ED with hypotension from [facility]. Vitals on presentation notable for hypertension to 86/55, afebrile, 97% on room air. Physical exam revealed a thin man comfortably resting on stretcher. Alert and oriented x4. No stridor. On auscultation normal breath sounds and no respiratory distress. Heart regular rate and rhythm. Abdomen flat, nontender to palpation, soft. No edema noted in legs.    Differential includes but is not limited to:  -Hypovolemia  -Shock-cardiogenic, septic, hemorrhagic    Pertinent labs and imaging:  CBC WNL  CMP with hyponatremia to 134 and hypoglycemia to 67  Lactate WNL  BNP, troponin WNL  UA clean  HIV non-reactive  CXR with no acute abnormality.    Patient initially refusing labs/IV as he has a needle phobia. Patient agreed to have a Coke and snacks and see how his pressure improves. In setting of only minor improvement to BP patient agreeable to labs. Low suspicion for hemorrhagic shock as patient denies any blood in stool, urine, wounds. Additionally, low suspicion for cardiogenic shock or septic shock as physical exam not suggestive of cardiac failure (dry, regular rate and rhythm, normal breath sounds, no visible source of infection, no fevers/chills). Patient received 2 L of IV fluids, however no major improvement in hypotension. In setting of MAP >65, patient admitted to observation  for resolution of hypotension.    Amount and/or Complexity of Data Reviewed  Labs: ordered.  Radiology: ordered.              Attending Attestation:   Physician Attestation Statement for Resident:  As the supervising MD   Physician Attestation Statement: I have personally seen and examined this patient.   I agree with the above history.  -:   As the supervising MD I agree with the above PE.     As the supervising MD I agree with the above treatment, course, plan, and disposition.            Attending Critical Care:   Critical Care Times:   Direct Patient Care (initial evaluation, reassessments, and time considering the case)................................................................28 minutes.   Additional History from reviewing old medical records or taking additional history from the family, EMS, PCP, etc.......................4 minutes.   Ordering, Reviewing, and Interpreting Diagnostic Studies...............................................................................................................4 minutes.   Documentation..................................................................................................................................................................................4 minutes.   Consultation with other Physicians. .................................................................................................................................................4 minutes.   ==============================================================  Total Critical Care Time - exclusive of procedural time: 44 minutes.  ==============================================================                                 Clinical Impression:  Final diagnoses:  [I95.9] Hypotension, unspecified hypotension type (Primary)          ED Disposition Condition    Observation Stable                Daniela Nolasco MD  Resident  12/12/24 1920       Kevin Clay MD  12/13/24 1111

## 2024-12-12 NOTE — ED TRIAGE NOTES
Endy Bruno, a 49 y.o. male presents to the ED w/ complaint of hypotension. Per patient his blood pressure is always low when he goes through detox.     Triage note:  Chief Complaint   Patient presents with    Hypotension     Review of patient's allergies indicates:   Allergen Reactions    Penicillins Anaphylaxis    Aspirin Rash     In childhood. Rash and bruising. Tolerates other NSAIDs including ibuprofen     No past medical history on file.

## 2024-12-12 NOTE — ED NOTES
Pt requesting to smoke a cigarette, Dr Braden at bedside educating pt on not walking around at the moment when BP is low. Nicotine patch ordered.

## 2024-12-12 NOTE — FIRST PROVIDER EVALUATION
Medical screening exam completed.  I have conducted a focused provider triage encounter, findings are as follows:    Brief history of present illness:  Patient presents with hypotension.  I was asked to evaluate the patient because he stated he wanted to leave.  I discussed with the patient the dangers of having low blood pressure and he ultimately agreed to stay.  He wanted to go smoke a cigarette which I told him we can not allow him to do.  I offered a nicotine patch which he accepted.    There were no vitals filed for this visit.    Pertinent physical exam:  alert and oriented.     Brief workup plan:  Deferred to primary team.     Connor Braden MD

## 2024-12-13 VITALS
HEIGHT: 65 IN | BODY MASS INDEX: 21.66 KG/M2 | WEIGHT: 130 LBS | SYSTOLIC BLOOD PRESSURE: 137 MMHG | OXYGEN SATURATION: 97 % | HEART RATE: 98 BPM | DIASTOLIC BLOOD PRESSURE: 84 MMHG | TEMPERATURE: 98 F | RESPIRATION RATE: 17 BRPM

## 2024-12-13 PROBLEM — I95.9 HYPOTENSION: Status: ACTIVE | Noted: 2024-12-13

## 2024-12-13 LAB
AV AREA BY CONTINUOUS VTI: 2.3 CM2
AV INDEX (PROSTH): 0.74
AV LVOT MEAN GRADIENT: 3 MMHG
AV LVOT PEAK GRADIENT: 4 MMHG
AV MEAN GRADIENT: 4.1 MMHG
AV PEAK GRADIENT: 6.8 MMHG
AV VALVE AREA BY VELOCITY RATIO: 2.4 CM²
AV VALVE AREA: 2.3 CM2
AV VELOCITY RATIO: 0.77
BSA FOR ECHO PROCEDURE: 1.64 M2
CV ECHO LV RWT: 0.29 CM
DOP CALC AO PEAK VEL: 1.3 M/S
DOP CALC AO VTI: 28.1 CM
DOP CALC LVOT AREA: 3.1 CM2
DOP CALC LVOT DIAMETER: 2 CM
DOP CALC LVOT PEAK VEL: 1 M/S
DOP CALC LVOT STROKE VOLUME: 65 CM3
DOP CALCLVOT PEAK VEL VTI: 20.7 CM
E WAVE DECELERATION TIME: 215.88 MS
E/A RATIO: 0.95
E/E' RATIO: 4.38 M/S
ECHO EF ESTIMATED: 56 %
ECHO LV POSTERIOR WALL: 0.7 CM (ref 0.6–1.1)
FRACTIONAL SHORTENING: 29.2 % (ref 28–44)
INTERVENTRICULAR SEPTUM: 0.7 CM (ref 0.6–1.1)
IVC DIAMETER: 0.76 CM
LA MAJOR: 4.37 CM
LA MINOR: 4.21 CM
LA WIDTH: 3.56 CM
LEFT ATRIUM SIZE: 3.13 CM
LEFT ATRIUM VOLUME INDEX MOD: 24.5 ML/M2
LEFT ATRIUM VOLUME INDEX: 24.6 ML/M2
LEFT ATRIUM VOLUME MOD: 40.35 ML
LEFT ATRIUM VOLUME: 40.62 CM3
LEFT INTERNAL DIMENSION IN SYSTOLE: 3.4 CM (ref 2.1–4)
LEFT VENTRICLE DIASTOLIC VOLUME INDEX: 65.05 ML/M2
LEFT VENTRICLE DIASTOLIC VOLUME: 107.34 ML
LEFT VENTRICLE MASS INDEX: 64.8 G/M2
LEFT VENTRICLE SYSTOLIC VOLUME INDEX: 28.9 ML/M2
LEFT VENTRICLE SYSTOLIC VOLUME: 47.64 ML
LEFT VENTRICULAR INTERNAL DIMENSION IN DIASTOLE: 4.8 CM (ref 3.5–6)
LEFT VENTRICULAR MASS: 106.9 G
LV LATERAL E/E' RATIO: 3.8
LV SEPTAL E/E' RATIO: 5.18
MV A" WAVE DURATION": 74.22 MS
MV PEAK A VEL: 0.6 M/S
MV PEAK E VEL: 0.57 M/S
OHS CV RV/LV RATIO: 0.67 CM
PISA TR MAX VEL: 2.71 M/S
PULM VEIN A" WAVE DURATION": 74.22 MS
PULM VEIN S/D RATIO: 1.41
PULMONIC VEIN PEAK A VELOCITY: 0.2 M/S
PV PEAK D VEL: 0.54 M/S
PV PEAK S VEL: 0.76 M/S
RA MAJOR: 3.43 CM
RA PRESSURE ESTIMATED: 3 MMHG
RA WIDTH: 3.54 CM
RIGHT ATRIAL AREA: 13 CM2
RIGHT VENTRICLE DIASTOLIC BASEL DIMENSION: 3.2 CM
RV TB RVSP: 6 MMHG
RV TISSUE DOPPLER FREE WALL SYSTOLIC VELOCITY 1 (APICAL 4 CHAMBER VIEW): 13.43 CM/S
SINUS: 3.53 CM
TDI LATERAL: 0.15 M/S
TDI SEPTAL: 0.11 M/S
TDI: 0.13 M/S
TR MAX PG: 29 MMHG
TRICUSPID ANNULAR PLANE SYSTOLIC EXCURSION: 2.11 CM
TV PEAK GRADIENT: 29 MMHG
TV REST PULMONARY ARTERY PRESSURE: 32 MMHG
Z-SCORE OF LEFT VENTRICULAR DIMENSION IN END DIASTOLE: 0.36
Z-SCORE OF LEFT VENTRICULAR DIMENSION IN END SYSTOLE: 1.34

## 2024-12-13 PROCEDURE — 25000003 PHARM REV CODE 250: Performed by: INTERNAL MEDICINE

## 2024-12-13 PROCEDURE — G0378 HOSPITAL OBSERVATION PER HR: HCPCS

## 2024-12-13 PROCEDURE — 25000003 PHARM REV CODE 250: Performed by: FAMILY MEDICINE

## 2024-12-13 RX ORDER — CYCLOBENZAPRINE HCL 5 MG
5 TABLET ORAL EVERY 8 HOURS PRN
Status: DISCONTINUED | OUTPATIENT
Start: 2024-12-13 | End: 2024-12-13

## 2024-12-13 RX ORDER — PROCHLORPERAZINE MALEATE 10 MG
10 TABLET ORAL 4 TIMES DAILY PRN
Status: DISCONTINUED | OUTPATIENT
Start: 2024-12-13 | End: 2024-12-13 | Stop reason: HOSPADM

## 2024-12-13 RX ORDER — CYCLOBENZAPRINE HCL 5 MG
10 TABLET ORAL EVERY 8 HOURS PRN
Status: DISCONTINUED | OUTPATIENT
Start: 2024-12-13 | End: 2024-12-13 | Stop reason: HOSPADM

## 2024-12-13 RX ORDER — LOPERAMIDE HYDROCHLORIDE 2 MG/1
2 CAPSULE ORAL
Status: DISCONTINUED | OUTPATIENT
Start: 2024-12-13 | End: 2024-12-13 | Stop reason: HOSPADM

## 2024-12-13 RX ORDER — BUPRENORPHINE 2 MG/1
4 TABLET SUBLINGUAL
Status: DISCONTINUED | OUTPATIENT
Start: 2024-12-13 | End: 2024-12-13 | Stop reason: HOSPADM

## 2024-12-13 RX ORDER — BUPRENORPHINE 2 MG/1
2 TABLET SUBLINGUAL ONCE
Status: COMPLETED | OUTPATIENT
Start: 2024-12-13 | End: 2024-12-13

## 2024-12-13 RX ORDER — HYDROXYZINE HYDROCHLORIDE 25 MG/1
50 TABLET, FILM COATED ORAL EVERY 6 HOURS PRN
Status: DISCONTINUED | OUTPATIENT
Start: 2024-12-13 | End: 2024-12-13 | Stop reason: HOSPADM

## 2024-12-13 RX ORDER — BUPRENORPHINE 2 MG/1
2 TABLET SUBLINGUAL
Status: DISCONTINUED | OUTPATIENT
Start: 2024-12-13 | End: 2024-12-13

## 2024-12-13 RX ORDER — NALOXONE HYDROCHLORIDE 4 MG/.1ML
1 SPRAY NASAL ONCE
Qty: 2 EACH | Refills: 0 | Status: SHIPPED | OUTPATIENT
Start: 2024-12-13 | End: 2024-12-14

## 2024-12-13 RX ADMIN — Medication 400 ML: at 06:12

## 2024-12-13 RX ADMIN — BUPRENORPHINE 2 MG: 2 TABLET SUBLINGUAL at 11:12

## 2024-12-13 RX ADMIN — Medication 400 ML: at 03:12

## 2024-12-13 RX ADMIN — BUPRENORPHINE 2 MG: 2 TABLET SUBLINGUAL at 03:12

## 2024-12-13 NOTE — ASSESSMENT & PLAN NOTE
- low normal soft BP with MAPs maintaining over 65  - improved with IVF  - no evidence of infection or other shock cardiogenic/ect  - [facility] qshift  - monitor tele  - ECHO pending  - further management pending clinical course and future study review

## 2024-12-13 NOTE — ED NOTES
Assumed care of patient at this time. Pt placed in hospital gown and currently lying in stretcher. Vital signs are stable, provided pt with warm blanket. Pt denied restroom use. No other complaints from pt at this time.     Endy Bruno, a 49 y.o. male presents to the ED w/ complaint of hypotension,Pt denies any headaches ,dizziness,blurry vision at this time.    Triage note:  Chief Complaint   Patient presents with    Hypotension     Review of patient's allergies indicates:   Allergen Reactions    Penicillins Anaphylaxis    Aspirin Rash     In childhood. Rash and bruising. Tolerates other NSAIDs including ibuprofen     History reviewed. No pertinent past medical history.

## 2024-12-13 NOTE — PLAN OF CARE
[facility] - Emergency Dept  Initial Discharge Assessment       Primary Care Provider: Li, Primary Doctor    Admission Diagnosis: Chest pain [R07.9]    Admission Date: 12/12/2024  Expected Discharge Date: 12/13/2024    Transition of Care Barriers: (P) Substance Abuse, Underinsured, Unable to afford medication    Payor: MEDICAID / Plan: [employer] / Product Type: Managed Medicaid /     No emergency contact information on file.    Discharge Plan A: (P) Rehab ([facility])  Discharge Plan B: (P) Rehab    No Pharmacies Listed    Initial Assessment (most recent)       Adult Discharge Assessment - 12/13/24 1034          Discharge Assessment    Assessment Type Discharge Planning Assessment (P)      Confirmed/corrected address, phone number and insurance Yes (P)      Confirmed Demographics Correct on Facesheet (P)      Source of Information patient;health record (P)      Communicated CEDRIC with patient/caregiver Yes (P)      People in Home facility resident (P)      Do you expect to return to your current living situation? Yes (P)      Prior to hospitilization cognitive status: Alert/Oriented (P)      Current cognitive status: Alert/Oriented (P)      Equipment Currently Used at Home none (P)      Do you take prescription medications? Yes (P)      Do you have prescription coverage? Yes (P)      Do you have any problems affording any of your prescribed medications? TBD (P)      How do you get to doctors appointments? family or friend will provide;health plan transportation;public transportation (P)      Discharge Plan A Rehab (P)    [facility]    Discharge Plan B Rehab (P)      DME Needed Upon Discharge  none (P)      Discharge Plan discussed with: Patient (P)      Transition of Care Barriers Substance Abuse;Underinsured;Unable to afford medication (P)         Physical Activity    On average, how many days per week do you engage in moderate to strenuous exercise (like a brisk walk)? 0 days (P)         Financial  Resource Strain    How hard is it for you to pay for the very basics like food, housing, medical care, and heating? Somewhat hard (P)         Housing Stability    In the last 12 months, was there a time when you were not able to pay the mortgage or rent on time? Yes (P)         Transportation Needs    Has the lack of transportation kept you from medical appointments, meetings, work or from getting things needed for daily living? Yes, it has kept me from medical appointments or from getting my medications. (P)         Food Insecurity    Within the past 12 months, you worried that your food would run out before you got the money to buy more. Sometimes true (P)         Alcohol Use    Q1: How often do you have a drink containing alcohol? Patient declined (P)         Health Literacy    How often do you need to have someone help you when you read instructions, pamphlets, or other written material from your doctor or pharmacy? Rarely (P)

## 2024-12-13 NOTE — SUBJECTIVE & OBJECTIVE
History reviewed. No pertinent past medical history.    Past Surgical History:   Procedure Laterality Date    INCISION AND DRAINAGE OF ABSCESS Right 1/7/2023    Procedure: INCISION AND DRAINAGE, ABSCESS;  Surgeon: Liyah Shetty MD;  Location: [address];  Service: Urology;  Laterality: Right;    ORCHIECTOMY Right 1/7/2023    Procedure: ORCHIECTOMY;  Surgeon: Liyah Shetty MD;  Location: [address];  Service: Urology;  Laterality: Right;    SCROTUM EXPLORATION Right 1/7/2023    Procedure: EXPLORATION, SCROTUM;  Surgeon: Liyah Shetty MD;  Location: [address];  Service: Urology;  Laterality: Right;       Review of patient's allergies indicates:   Allergen Reactions    Penicillins Anaphylaxis    Aspirin Rash     In childhood. Rash and bruising. Tolerates other NSAIDs including ibuprofen       No current facility-administered medications on file prior to encounter.     Current Outpatient Medications on File Prior to Encounter   Medication Sig    acetaminophen (TYLENOL) 500 MG tablet Take 2 tablets (1,000 mg total) by mouth every 8 (eight) hours.    celecoxib (CELEBREX) 200 MG capsule Take 1 capsule (200 mg total) by mouth once daily.    nicotine (NICODERM CQ) 14 mg/24 hr Place 1 patch onto the skin once daily.     Family History    None       Tobacco Use    Smoking status: Every Day     Current packs/day: 1.00     Types: Cigarettes    Smokeless tobacco: Never   Substance and Sexual Activity    Alcohol use: No    Drug use: Yes     Types: IV     Comment: fentanyl IV daily    Sexual activity: Not Currently     Review of Systems   Constitutional:  Negative for chills, fatigue and fever.   HENT:  Negative for sore throat and trouble swallowing.    Eyes:  Negative for photophobia and visual disturbance.   Respiratory:  Negative for cough, shortness of breath and wheezing.    Cardiovascular:  Negative for chest pain, palpitations and leg swelling.   Gastrointestinal:  Negative for abdominal  distention, abdominal pain, diarrhea, nausea and vomiting.   Genitourinary:  Negative for dysuria and hematuria.   Musculoskeletal:  Positive for arthralgias. Negative for neck pain and neck stiffness.   Skin:  Negative for rash and wound.   Neurological:  Negative for seizures, syncope, weakness, light-headedness, numbness and headaches.   Psychiatric/Behavioral:  Negative for confusion and decreased concentration.      Objective:     Vital Signs (Most Recent):  Temp: 98.4 °F (36.9 °C) (12/12/24 1500)  Pulse: 75 (12/12/24 2030)  Resp: (!) 22 (12/12/24 2030)  BP: 107/66 (12/12/24 2030)  SpO2: 95 % (12/12/24 1900) Vital Signs (24h Range):  Temp:  [98.1 °F (36.7 °C)-98.4 °F (36.9 °C)] 98.4 °F (36.9 °C)  Pulse:  [63-75] 75  Resp:  [14-24] 22  SpO2:  [94 %-97 %] 95 %  BP: ()/(50-66) 107/66     Weight: 59 kg (130 lb)  Body mass index is 21.63 kg/m².     Physical Exam  Constitutional:       General: He is not in acute distress.     Appearance: He is not ill-appearing, toxic-appearing or diaphoretic.   HENT:      Head: Normocephalic and atraumatic.      Nose: Nose normal.   Eyes:      General: No scleral icterus.     Extraocular Movements: Extraocular movements intact.      Pupils: Pupils are equal, round, and reactive to light.   Cardiovascular:      Rate and Rhythm: Normal rate and regular rhythm.   Pulmonary:      Effort: Pulmonary effort is normal. No respiratory distress.      Breath sounds: No wheezing or rales.   Abdominal:      General: Abdomen is flat. There is no distension.      Palpations: Abdomen is soft.      Tenderness: There is no abdominal tenderness. There is no guarding.   Musculoskeletal:         General: Normal range of motion.      Cervical back: Normal range of motion and neck supple. No rigidity.      Right lower leg: No edema.      Left lower leg: No edema.   Skin:     General: Skin is warm and dry.      Coloration: Skin is not jaundiced.   Neurological:      General: No focal deficit  present.      Mental Status: He is alert and oriented to person, place, and time.      Cranial Nerves: No cranial nerve deficit.   Psychiatric:         Mood and Affect: Mood normal.         Behavior: Behavior normal.              CRANIAL NERVES     CN III, IV, VI   Pupils are equal, round, and reactive to light.       Significant Labs: All pertinent labs within the past 24 hours have been reviewed.  CBC:   Recent Labs   Lab 12/12/24  1437   WBC 10.24   HGB 15.4   HCT 43.6        CMP:   Recent Labs   Lab 12/12/24  1437   *   K 3.8      CO2 25   GLU 67*   BUN 10   CREATININE 0.9   CALCIUM 9.2   PROT 7.3   ALBUMIN 3.8   BILITOT 0.5   ALKPHOS 98   AST 21   ALT 14   ANIONGAP 9       Significant Imaging: I have reviewed all pertinent imaging results/findings within the past 24 hours.

## 2024-12-13 NOTE — HPI
"Patient is a 48 yo male with PMHx of IVDU, tobacco use, Hepatitis C. Presenting to the ED with hypotension. Patient overdosed two days ago and presented to [facility] ED. At that time he did not have any withdrawal symptoms and was discharged after a period of observation. He went to [facility] to detox. This morning [facility] was unable to get his blood pressure and sent him via EMS to [facility]. During EMS transport, patient deferred placement of IV and did not receive any fluids. Manual pressure with EMS 72/48. Patient states that he has had prior episodes of hypotension while detoxing which improves with Gatorade and a cigarette. He states that blood pressure is at baseline low with systolics in the 90s. Patient denies any history of complicated withdrawals/seizures. He last used heroin/fentanyl two days ago via skin poppers and is still "coming down". He states that he has not eaten much over the last two days due to recent drug use. Denies any CP, SOB, abdominal pain, fevers/chills, nausea/vomiting, cough, sore throat, blood in stool or urine.     In the ED patient afebrile and hemodynamically stable though low/soft BP. Saturating well on room air. Patient clinically a bit hypersomnolent but alert and oriented and following commands and responsive. Given 2L IVF however BP remains soft. Admitted to the care of medicine for further observation and management.   "

## 2024-12-13 NOTE — ED NOTES
Pt stated ,'' I'm in detox,I feel anxious,hot and can't sleep.DELANEY Mcfadden notified ,New order for Atarax and muscle relaxes was received.Pt expressed that the only thing that would help him is methadone and refusing the atarax,muscle relaxer and morning labs.He said,'' if I can't have methadone I don't want any morning labs.Kush Brownlee PA has been notified.

## 2024-12-13 NOTE — PLAN OF CARE
Discharge planning.    Plan of care reviewed    Mr. Bruno was admitted yesterday to the emergency room, he presented here from [facility] rehab.    Mr. Bruno wants to return to [facility] upon medically clearance.  Also spoke with Chuy at [facility],    per Chuy he can return when medically ready and the program will provide a [employer] transport back to their facility.   Chuy [phone]. Or [phone]     Disposition- await med clearance from Hospital Medical team.  Anticipate rehab returned to [facility] later today or tomorrow.  Case management will continue to follow and remain supportive.

## 2024-12-13 NOTE — H&P
"  [facility] - Emergency Dept  [facility]  History & Physical    Patient Name: Endy Bruno  MRN: [id number]  Patient Class: OP- Observation  Admission Date: 12/12/2024  Attending Physician: Erick Bethea MD   Primary Care Provider: [facility]         Patient information was obtained from patient, past medical records, and ER records.     Subjective:     Principal Problem:Hypotension    Chief Complaint:   Chief Complaint   Patient presents with    Hypotension        HPI: Patient is a 50 yo male with PMHx of IVDU, tobacco use, Hepatitis C. Presenting to the ED with hypotension. Patient overdosed two days ago and presented to [facility] ED. At that time he did not have any withdrawal symptoms and was discharged after a period of observation. He went to [facility] to detox. This morning [facility] was unable to get his blood pressure and sent him via EMS to [facility]. During EMS transport, patient deferred placement of IV and did not receive any fluids. Manual pressure with EMS 72/48. Patient states that he has had prior episodes of hypotension while detoxing which improves with Gatorade and a cigarette. He states that blood pressure is at baseline low with systolics in the 90s. Patient denies any history of complicated withdrawals/seizures. He last used heroin/fentanyl two days ago via skin poppers and is still "coming down". He states that he has not eaten much over the last two days due to recent drug use. Denies any CP, SOB, abdominal pain, fevers/chills, nausea/vomiting, cough, sore throat, blood in stool or urine.     In the ED patient afebrile and hemodynamically stable though low/soft BP. Saturating well on room air. Patient clinically a bit hypersomnolent but alert and oriented and following commands and responsive. Given 2L IVF however BP remains soft. Admitted to the care of medicine for further observation and management.     History reviewed. No pertinent past medical history.    Past Surgical History: "   Procedure Laterality Date    INCISION AND DRAINAGE OF ABSCESS Right 1/7/2023    Procedure: INCISION AND DRAINAGE, ABSCESS;  Surgeon: Liyah Shetty MD;  Location: [address];  Service: Urology;  Laterality: Right;    ORCHIECTOMY Right 1/7/2023    Procedure: ORCHIECTOMY;  Surgeon: Liyah Shetty MD;  Location: [address];  Service: Urology;  Laterality: Right;    SCROTUM EXPLORATION Right 1/7/2023    Procedure: EXPLORATION, SCROTUM;  Surgeon: Liyah Shetty MD;  Location: [address];  Service: Urology;  Laterality: Right;       Review of patient's allergies indicates:   Allergen Reactions    Penicillins Anaphylaxis    Aspirin Rash     In childhood. Rash and bruising. Tolerates other NSAIDs including ibuprofen       No current facility-administered medications on file prior to encounter.     Current Outpatient Medications on File Prior to Encounter   Medication Sig    acetaminophen (TYLENOL) 500 MG tablet Take 2 tablets (1,000 mg total) by mouth every 8 (eight) hours.    celecoxib (CELEBREX) 200 MG capsule Take 1 capsule (200 mg total) by mouth once daily.    nicotine (NICODERM CQ) 14 mg/24 hr Place 1 patch onto the skin once daily.     Family History    None       Tobacco Use    Smoking status: Every Day     Current packs/day: 1.00     Types: Cigarettes    Smokeless tobacco: Never   Substance and Sexual Activity    Alcohol use: No    Drug use: Yes     Types: IV     Comment: fentanyl IV daily    Sexual activity: Not Currently     Review of Systems   Constitutional:  Negative for chills, fatigue and fever.   HENT:  Negative for sore throat and trouble swallowing.    Eyes:  Negative for photophobia and visual disturbance.   Respiratory:  Negative for cough, shortness of breath and wheezing.    Cardiovascular:  Negative for chest pain, palpitations and leg swelling.   Gastrointestinal:  Negative for abdominal distention, abdominal pain, diarrhea, nausea and vomiting.   Genitourinary:  Negative  for dysuria and hematuria.   Musculoskeletal:  Positive for arthralgias. Negative for neck pain and neck stiffness.   Skin:  Negative for rash and wound.   Neurological:  Negative for seizures, syncope, weakness, light-headedness, numbness and headaches.   Psychiatric/Behavioral:  Negative for confusion and decreased concentration.      Objective:     Vital Signs (Most Recent):  Temp: 98.4 °F (36.9 °C) (12/12/24 1500)  Pulse: 75 (12/12/24 2030)  Resp: (!) 22 (12/12/24 2030)  BP: 107/66 (12/12/24 2030)  SpO2: 95 % (12/12/24 1900) Vital Signs (24h Range):  Temp:  [98.1 °F (36.7 °C)-98.4 °F (36.9 °C)] 98.4 °F (36.9 °C)  Pulse:  [63-75] 75  Resp:  [14-24] 22  SpO2:  [94 %-97 %] 95 %  BP: ()/(50-66) 107/66     Weight: 59 kg (130 lb)  Body mass index is 21.63 kg/m².     Physical Exam  Constitutional:       General: He is not in acute distress.     Appearance: He is not ill-appearing, toxic-appearing or diaphoretic.   HENT:      Head: Normocephalic and atraumatic.      Nose: Nose normal.   Eyes:      General: No scleral icterus.     Extraocular Movements: Extraocular movements intact.      Pupils: Pupils are equal, round, and reactive to light.   Cardiovascular:      Rate and Rhythm: Normal rate and regular rhythm.   Pulmonary:      Effort: Pulmonary effort is normal. No respiratory distress.      Breath sounds: No wheezing or rales.   Abdominal:      General: Abdomen is flat. There is no distension.      Palpations: Abdomen is soft.      Tenderness: There is no abdominal tenderness. There is no guarding.   Musculoskeletal:         General: Normal range of motion.      Cervical back: Normal range of motion and neck supple. No rigidity.      Right lower leg: No edema.      Left lower leg: No edema.   Skin:     General: Skin is warm and dry.      Coloration: Skin is not jaundiced.   Neurological:      General: No focal deficit present.      Mental Status: He is alert and oriented to person, place, and time.       Cranial Nerves: No cranial nerve deficit.   Psychiatric:         Mood and Affect: Mood normal.         Behavior: Behavior normal.              CRANIAL NERVES     CN III, IV, VI   Pupils are equal, round, and reactive to light.       Significant Labs: All pertinent labs within the past 24 hours have been reviewed.  CBC:   Recent Labs   Lab 12/12/24  1437   WBC 10.24   HGB 15.4   HCT 43.6        CMP:   Recent Labs   Lab 12/12/24  1437   *   K 3.8      CO2 25   GLU 67*   BUN 10   CREATININE 0.9   CALCIUM 9.2   PROT 7.3   ALBUMIN 3.8   BILITOT 0.5   ALKPHOS 98   AST 21   ALT 14   ANIONGAP 9       Significant Imaging: I have reviewed all pertinent imaging results/findings within the past 24 hours.  Assessment/Plan:     * Hypotension  - low normal soft BP with MAPs maintaining over 65  - improved with IVF  - no evidence of infection or other shock cardiogenic/ect  - [facility] qshift  - monitor tele  - ECHO pending  - further management pending clinical course and future study review      IVDU (intravenous drug user)  - presented from rehab facility  - consider addiction psych consult in am      Opioid use disorder, severe, dependence  - previously on suboxone ; may consider restarting inpatient      Dependence on nicotine from cigarettes  - nicotine transderm prn      VTE Risk Mitigation (From admission, onward)           Ordered     IP VTE LOW RISK PATIENT  Once         12/12/24 1759     Place sequential compression device  Until discontinued         12/12/24 1759                       On 12/13/2024, patient should be placed in hospital observation services under my care.             Erick Bethea MD  Department of Hospital Medicine  [facility] - Emergency Dept

## 2024-12-14 NOTE — DISCHARGE INSTRUCTIONS
Patient is medically cleared for discharge to [facility]. He is no longer dangerously hypotensive.

## 2024-12-17 LAB
BACTERIA BLD CULT: NORMAL
BACTERIA BLD CULT: NORMAL

## (undated) DEVICE — SUT VICRYL 3-0 27 SH

## (undated) DEVICE — COVER LIGHT HANDLE 80/CA

## (undated) DEVICE — GAUZE FLUFF XXLG 36X36 2 PLY

## (undated) DEVICE — ELECTRODE NEEDLE 2.8IN

## (undated) DEVICE — DILATERIA MEDIUM-THICK

## (undated) DEVICE — SUT CHROMIC 3-0 SH 27IN GUT

## (undated) DEVICE — TRAY SKIN SCRUB WET PREMIUM

## (undated) DEVICE — ELECTRODE REM PLYHSV RETURN 9

## (undated) DEVICE — DRAIN PENROSE XRAY 12 X 1/4 ST

## (undated) DEVICE — SUSPENSORY X-LARGE

## (undated) DEVICE — TOWEL OR DISP STRL BLUE 4/PK

## (undated) DEVICE — TRAY MINOR GEN SURG OMC

## (undated) DEVICE — GOWN SURGICAL X-LARGE

## (undated) DEVICE — DRAPE LAP T SHT W/ INSTR PAD

## (undated) DEVICE — CLIPPER BLADE MOD 4406 (CAREF)